# Patient Record
Sex: FEMALE | Race: WHITE | Employment: OTHER | URBAN - METROPOLITAN AREA
[De-identification: names, ages, dates, MRNs, and addresses within clinical notes are randomized per-mention and may not be internally consistent; named-entity substitution may affect disease eponyms.]

---

## 2017-01-17 RX ORDER — LISINOPRIL 40 MG/1
TABLET ORAL
Qty: 30 TABLET | Refills: 3 | Status: SHIPPED | OUTPATIENT
Start: 2017-01-17 | End: 2017-06-19 | Stop reason: SDUPTHER

## 2017-01-26 ENCOUNTER — TELEPHONE (OUTPATIENT)
Dept: FAMILY MEDICINE CLINIC | Age: 62
End: 2017-01-26

## 2017-01-31 ENCOUNTER — OFFICE VISIT (OUTPATIENT)
Dept: FAMILY MEDICINE CLINIC | Age: 62
End: 2017-01-31

## 2017-01-31 VITALS
OXYGEN SATURATION: 99 % | HEART RATE: 63 BPM | TEMPERATURE: 97.6 F | BODY MASS INDEX: 46.82 KG/M2 | SYSTOLIC BLOOD PRESSURE: 160 MMHG | WEIGHT: 248 LBS | DIASTOLIC BLOOD PRESSURE: 84 MMHG | HEIGHT: 61 IN

## 2017-01-31 DIAGNOSIS — R73.9 HYPERGLYCEMIA: ICD-10-CM

## 2017-01-31 DIAGNOSIS — N95.1 POSTMENOPAUSAL DISORDER: ICD-10-CM

## 2017-01-31 DIAGNOSIS — L30.9 DERMATITIS: ICD-10-CM

## 2017-01-31 DIAGNOSIS — Z01.818 PREOP EXAMINATION: Primary | ICD-10-CM

## 2017-01-31 DIAGNOSIS — R31.9 HEMATURIA: ICD-10-CM

## 2017-01-31 DIAGNOSIS — I10 ESSENTIAL HYPERTENSION: ICD-10-CM

## 2017-01-31 DIAGNOSIS — E78.00 HYPERCHOLESTEREMIA: ICD-10-CM

## 2017-01-31 DIAGNOSIS — I47.1 SVT (SUPRAVENTRICULAR TACHYCARDIA) (HCC): ICD-10-CM

## 2017-01-31 LAB
BASOPHILS ABSOLUTE: 0.1 K/UL (ref 0–0.2)
BASOPHILS RELATIVE PERCENT: 0.9 %
EOSINOPHILS ABSOLUTE: 0.2 K/UL (ref 0–0.6)
EOSINOPHILS RELATIVE PERCENT: 2 %
HCT VFR BLD CALC: 44.3 % (ref 36–48)
HEMOGLOBIN: 14.9 G/DL (ref 12–16)
LYMPHOCYTES ABSOLUTE: 3.1 K/UL (ref 1–5.1)
LYMPHOCYTES RELATIVE PERCENT: 30.1 %
MCH RBC QN AUTO: 30 PG (ref 26–34)
MCHC RBC AUTO-ENTMCNC: 33.8 G/DL (ref 31–36)
MCV RBC AUTO: 88.8 FL (ref 80–100)
MONOCYTES ABSOLUTE: 0.6 K/UL (ref 0–1.3)
MONOCYTES RELATIVE PERCENT: 5.3 %
NEUTROPHILS ABSOLUTE: 6.4 K/UL (ref 1.7–7.7)
NEUTROPHILS RELATIVE PERCENT: 61.7 %
PDW BLD-RTO: 12.5 % (ref 12.4–15.4)
PLATELET # BLD: 254 K/UL (ref 135–450)
PMV BLD AUTO: 9.8 FL (ref 5–10.5)
RBC # BLD: 4.99 M/UL (ref 4–5.2)
TSH REFLEX: 2.35 UIU/ML (ref 0.27–4.2)
WBC # BLD: 10.4 K/UL (ref 4–11)

## 2017-01-31 PROCEDURE — 1036F TOBACCO NON-USER: CPT | Performed by: FAMILY MEDICINE

## 2017-01-31 PROCEDURE — G8428 CUR MEDS NOT DOCUMENT: HCPCS | Performed by: FAMILY MEDICINE

## 2017-01-31 PROCEDURE — 93000 ELECTROCARDIOGRAM COMPLETE: CPT | Performed by: FAMILY MEDICINE

## 2017-01-31 PROCEDURE — 36415 COLL VENOUS BLD VENIPUNCTURE: CPT | Performed by: FAMILY MEDICINE

## 2017-01-31 PROCEDURE — 3017F COLORECTAL CA SCREEN DOC REV: CPT | Performed by: FAMILY MEDICINE

## 2017-01-31 PROCEDURE — G8419 CALC BMI OUT NRM PARAM NOF/U: HCPCS | Performed by: FAMILY MEDICINE

## 2017-01-31 PROCEDURE — 99215 OFFICE O/P EST HI 40 MIN: CPT | Performed by: FAMILY MEDICINE

## 2017-01-31 PROCEDURE — 3014F SCREEN MAMMO DOC REV: CPT | Performed by: FAMILY MEDICINE

## 2017-01-31 PROCEDURE — G8484 FLU IMMUNIZE NO ADMIN: HCPCS | Performed by: FAMILY MEDICINE

## 2017-01-31 RX ORDER — BETAMETHASONE DIPROPIONATE 0.5 MG/G
CREAM TOPICAL
Qty: 30 G | Refills: 5 | Status: SHIPPED | OUTPATIENT
Start: 2017-01-31 | End: 2020-12-28 | Stop reason: SDUPTHER

## 2017-01-31 RX ORDER — CIPROFLOXACIN HYDROCHLORIDE 3.5 MG/ML
1 SOLUTION/ DROPS TOPICAL 4 TIMES DAILY
Qty: 1 BOTTLE | Refills: 0 | Status: SHIPPED | OUTPATIENT
Start: 2017-01-31 | End: 2017-02-10

## 2017-01-31 ASSESSMENT — ENCOUNTER SYMPTOMS
ABDOMINAL DISTENTION: 0
SHORTNESS OF BREATH: 0
ABDOMINAL PAIN: 0
COLOR CHANGE: 0
EYE ITCHING: 0
CHEST TIGHTNESS: 0
EYE DISCHARGE: 0

## 2017-02-01 LAB
ALT SERPL-CCNC: 25 U/L (ref 10–40)
ANION GAP SERPL CALCULATED.3IONS-SCNC: 19 MMOL/L (ref 3–16)
BUN BLDV-MCNC: 11 MG/DL (ref 7–20)
CALCIUM SERPL-MCNC: 9.6 MG/DL (ref 8.3–10.6)
CHLORIDE BLD-SCNC: 104 MMOL/L (ref 99–110)
CHOLESTEROL, TOTAL: 206 MG/DL (ref 0–199)
CO2: 21 MMOL/L (ref 21–32)
CREAT SERPL-MCNC: 0.7 MG/DL (ref 0.6–1.1)
ESTIMATED AVERAGE GLUCOSE: 114 MG/DL
GFR AFRICAN AMERICAN: >60
GFR NON-AFRICAN AMERICAN: >60
GLUCOSE BLD-MCNC: 93 MG/DL (ref 70–99)
HBA1C MFR BLD: 5.6 %
HDLC SERPL-MCNC: 62 MG/DL (ref 40–60)
LDL CHOLESTEROL CALCULATED: 115 MG/DL
POTASSIUM SERPL-SCNC: 3.9 MMOL/L (ref 3.5–5.1)
SODIUM BLD-SCNC: 144 MMOL/L (ref 136–145)
TRIGL SERPL-MCNC: 146 MG/DL (ref 0–150)
VLDLC SERPL CALC-MCNC: 29 MG/DL

## 2017-02-24 RX ORDER — PRAVASTATIN SODIUM 40 MG
TABLET ORAL
Qty: 30 TABLET | Refills: 5 | Status: SHIPPED | OUTPATIENT
Start: 2017-02-24 | End: 2017-09-14 | Stop reason: SDUPTHER

## 2017-02-24 RX ORDER — MELOXICAM 15 MG/1
TABLET ORAL
Qty: 30 TABLET | Refills: 5 | Status: SHIPPED | OUTPATIENT
Start: 2017-02-24 | End: 2017-09-14 | Stop reason: SDUPTHER

## 2017-05-24 RX ORDER — AMLODIPINE BESYLATE 2.5 MG/1
TABLET ORAL
Qty: 30 TABLET | Refills: 5 | Status: ON HOLD | OUTPATIENT
Start: 2017-05-24 | End: 2017-10-16 | Stop reason: HOSPADM

## 2017-05-24 RX ORDER — TERAZOSIN 2 MG/1
CAPSULE ORAL
Qty: 30 CAPSULE | Refills: 5 | Status: SHIPPED | OUTPATIENT
Start: 2017-05-24 | End: 2017-12-18 | Stop reason: SDUPTHER

## 2017-05-26 ENCOUNTER — OFFICE VISIT (OUTPATIENT)
Dept: FAMILY MEDICINE CLINIC | Age: 62
End: 2017-05-26

## 2017-05-26 VITALS
BODY MASS INDEX: 47.84 KG/M2 | WEIGHT: 253.2 LBS | DIASTOLIC BLOOD PRESSURE: 98 MMHG | HEART RATE: 62 BPM | OXYGEN SATURATION: 97 % | SYSTOLIC BLOOD PRESSURE: 150 MMHG | TEMPERATURE: 98 F

## 2017-05-26 DIAGNOSIS — R73.9 HYPERGLYCEMIA: ICD-10-CM

## 2017-05-26 DIAGNOSIS — R31.9 HEMATURIA: ICD-10-CM

## 2017-05-26 DIAGNOSIS — E78.00 HYPERCHOLESTEREMIA: ICD-10-CM

## 2017-05-26 DIAGNOSIS — I10 ESSENTIAL HYPERTENSION: Primary | ICD-10-CM

## 2017-05-26 LAB
ALT SERPL-CCNC: 28 U/L (ref 10–40)
ANION GAP SERPL CALCULATED.3IONS-SCNC: 15 MMOL/L (ref 3–16)
BUN BLDV-MCNC: 14 MG/DL (ref 7–20)
CALCIUM SERPL-MCNC: 9.2 MG/DL (ref 8.3–10.6)
CHLORIDE BLD-SCNC: 105 MMOL/L (ref 99–110)
CHOLESTEROL, TOTAL: 199 MG/DL (ref 0–199)
CO2: 23 MMOL/L (ref 21–32)
CREAT SERPL-MCNC: 0.8 MG/DL (ref 0.6–1.1)
GFR AFRICAN AMERICAN: >60
GFR NON-AFRICAN AMERICAN: >60
GLUCOSE BLD-MCNC: 92 MG/DL (ref 70–99)
HDLC SERPL-MCNC: 61 MG/DL (ref 40–60)
LDL CHOLESTEROL CALCULATED: 118 MG/DL
POTASSIUM SERPL-SCNC: 4.3 MMOL/L (ref 3.5–5.1)
SODIUM BLD-SCNC: 143 MMOL/L (ref 136–145)
TRIGL SERPL-MCNC: 101 MG/DL (ref 0–150)
VLDLC SERPL CALC-MCNC: 20 MG/DL

## 2017-05-26 PROCEDURE — 1036F TOBACCO NON-USER: CPT | Performed by: FAMILY MEDICINE

## 2017-05-26 PROCEDURE — 3017F COLORECTAL CA SCREEN DOC REV: CPT | Performed by: FAMILY MEDICINE

## 2017-05-26 PROCEDURE — 36415 COLL VENOUS BLD VENIPUNCTURE: CPT | Performed by: FAMILY MEDICINE

## 2017-05-26 PROCEDURE — 3014F SCREEN MAMMO DOC REV: CPT | Performed by: FAMILY MEDICINE

## 2017-05-26 PROCEDURE — 99214 OFFICE O/P EST MOD 30 MIN: CPT | Performed by: FAMILY MEDICINE

## 2017-05-26 PROCEDURE — G8427 DOCREV CUR MEDS BY ELIG CLIN: HCPCS | Performed by: FAMILY MEDICINE

## 2017-05-26 PROCEDURE — G8417 CALC BMI ABV UP PARAM F/U: HCPCS | Performed by: FAMILY MEDICINE

## 2017-05-27 LAB
ESTIMATED AVERAGE GLUCOSE: 111.2 MG/DL
HBA1C MFR BLD: 5.5 %

## 2017-06-19 RX ORDER — LISINOPRIL 40 MG/1
TABLET ORAL
Qty: 30 TABLET | Refills: 5 | Status: SHIPPED | OUTPATIENT
Start: 2017-06-19 | End: 2018-01-19 | Stop reason: SDUPTHER

## 2017-06-19 RX ORDER — ATENOLOL 50 MG/1
TABLET ORAL
Qty: 30 TABLET | Refills: 5 | Status: ON HOLD | OUTPATIENT
Start: 2017-06-19 | End: 2017-11-11

## 2017-09-14 RX ORDER — PRAVASTATIN SODIUM 40 MG
TABLET ORAL
Qty: 30 TABLET | Refills: 5 | Status: SHIPPED | OUTPATIENT
Start: 2017-09-14 | End: 2018-04-23 | Stop reason: SDUPTHER

## 2017-09-14 RX ORDER — MELOXICAM 15 MG/1
TABLET ORAL
Qty: 30 TABLET | Refills: 5 | Status: SHIPPED | OUTPATIENT
Start: 2017-09-14 | End: 2018-04-23 | Stop reason: SDUPTHER

## 2017-09-26 ENCOUNTER — OFFICE VISIT (OUTPATIENT)
Dept: FAMILY MEDICINE CLINIC | Age: 62
End: 2017-09-26

## 2017-09-26 VITALS
BODY MASS INDEX: 46.41 KG/M2 | OXYGEN SATURATION: 97 % | WEIGHT: 252.2 LBS | HEART RATE: 74 BPM | TEMPERATURE: 97.7 F | DIASTOLIC BLOOD PRESSURE: 84 MMHG | SYSTOLIC BLOOD PRESSURE: 134 MMHG | HEIGHT: 62 IN

## 2017-09-26 DIAGNOSIS — I47.1 SVT (SUPRAVENTRICULAR TACHYCARDIA) (HCC): ICD-10-CM

## 2017-09-26 DIAGNOSIS — Z12.31 SCREENING MAMMOGRAM, ENCOUNTER FOR: ICD-10-CM

## 2017-09-26 DIAGNOSIS — E78.00 HYPERCHOLESTEREMIA: ICD-10-CM

## 2017-09-26 DIAGNOSIS — R73.9 HYPERGLYCEMIA: ICD-10-CM

## 2017-09-26 DIAGNOSIS — I10 ESSENTIAL HYPERTENSION: Primary | ICD-10-CM

## 2017-09-26 LAB
ALT SERPL-CCNC: 21 U/L (ref 10–40)
ANION GAP SERPL CALCULATED.3IONS-SCNC: 16 MMOL/L (ref 3–16)
BUN BLDV-MCNC: 15 MG/DL (ref 7–20)
CALCIUM SERPL-MCNC: 9.4 MG/DL (ref 8.3–10.6)
CHLORIDE BLD-SCNC: 102 MMOL/L (ref 99–110)
CHOLESTEROL, TOTAL: 188 MG/DL (ref 0–199)
CO2: 23 MMOL/L (ref 21–32)
CREAT SERPL-MCNC: 0.9 MG/DL (ref 0.6–1.2)
GFR AFRICAN AMERICAN: >60
GFR NON-AFRICAN AMERICAN: >60
GLUCOSE BLD-MCNC: 86 MG/DL (ref 70–99)
HDLC SERPL-MCNC: 52 MG/DL (ref 40–60)
LDL CHOLESTEROL CALCULATED: 112 MG/DL
POTASSIUM SERPL-SCNC: 4.1 MMOL/L (ref 3.5–5.1)
SODIUM BLD-SCNC: 141 MMOL/L (ref 136–145)
TRIGL SERPL-MCNC: 119 MG/DL (ref 0–150)
TSH REFLEX: 2.68 UIU/ML (ref 0.27–4.2)
VLDLC SERPL CALC-MCNC: 24 MG/DL

## 2017-09-26 PROCEDURE — 99214 OFFICE O/P EST MOD 30 MIN: CPT | Performed by: FAMILY MEDICINE

## 2017-09-26 PROCEDURE — 3014F SCREEN MAMMO DOC REV: CPT | Performed by: FAMILY MEDICINE

## 2017-09-26 PROCEDURE — 1036F TOBACCO NON-USER: CPT | Performed by: FAMILY MEDICINE

## 2017-09-26 PROCEDURE — 3017F COLORECTAL CA SCREEN DOC REV: CPT | Performed by: FAMILY MEDICINE

## 2017-09-26 PROCEDURE — 90471 IMMUNIZATION ADMIN: CPT | Performed by: FAMILY MEDICINE

## 2017-09-26 PROCEDURE — G8417 CALC BMI ABV UP PARAM F/U: HCPCS | Performed by: FAMILY MEDICINE

## 2017-09-26 PROCEDURE — 90688 IIV4 VACCINE SPLT 0.5 ML IM: CPT | Performed by: FAMILY MEDICINE

## 2017-09-26 PROCEDURE — 36415 COLL VENOUS BLD VENIPUNCTURE: CPT | Performed by: FAMILY MEDICINE

## 2017-09-26 PROCEDURE — G8427 DOCREV CUR MEDS BY ELIG CLIN: HCPCS | Performed by: FAMILY MEDICINE

## 2017-09-26 ASSESSMENT — PATIENT HEALTH QUESTIONNAIRE - PHQ9
1. LITTLE INTEREST OR PLEASURE IN DOING THINGS: 0
2. FEELING DOWN, DEPRESSED OR HOPELESS: 0
SUM OF ALL RESPONSES TO PHQ QUESTIONS 1-9: 0
SUM OF ALL RESPONSES TO PHQ9 QUESTIONS 1 & 2: 0

## 2017-09-27 LAB
ESTIMATED AVERAGE GLUCOSE: 108.3 MG/DL
HBA1C MFR BLD: 5.4 %

## 2017-10-16 PROBLEM — R55 SYNCOPE AND COLLAPSE: Status: ACTIVE | Noted: 2017-10-16

## 2017-10-23 ENCOUNTER — TELEPHONE (OUTPATIENT)
Dept: CARDIOLOGY CLINIC | Age: 62
End: 2017-10-23

## 2017-10-27 ENCOUNTER — OFFICE VISIT (OUTPATIENT)
Dept: CARDIOLOGY CLINIC | Age: 62
End: 2017-10-27

## 2017-10-27 VITALS
BODY MASS INDEX: 46.82 KG/M2 | HEIGHT: 61 IN | WEIGHT: 248 LBS | SYSTOLIC BLOOD PRESSURE: 144 MMHG | DIASTOLIC BLOOD PRESSURE: 96 MMHG | HEART RATE: 78 BPM

## 2017-10-27 DIAGNOSIS — I47.1 SVT (SUPRAVENTRICULAR TACHYCARDIA) (HCC): Primary | ICD-10-CM

## 2017-10-27 DIAGNOSIS — R55 SYNCOPE AND COLLAPSE: ICD-10-CM

## 2017-10-27 DIAGNOSIS — G47.33 OSA (OBSTRUCTIVE SLEEP APNEA): ICD-10-CM

## 2017-10-27 DIAGNOSIS — I10 ESSENTIAL HYPERTENSION: ICD-10-CM

## 2017-10-27 PROCEDURE — 3017F COLORECTAL CA SCREEN DOC REV: CPT | Performed by: INTERNAL MEDICINE

## 2017-10-27 PROCEDURE — G8484 FLU IMMUNIZE NO ADMIN: HCPCS | Performed by: INTERNAL MEDICINE

## 2017-10-27 PROCEDURE — 93000 ELECTROCARDIOGRAM COMPLETE: CPT | Performed by: INTERNAL MEDICINE

## 2017-10-27 PROCEDURE — G8417 CALC BMI ABV UP PARAM F/U: HCPCS | Performed by: INTERNAL MEDICINE

## 2017-10-27 PROCEDURE — G8427 DOCREV CUR MEDS BY ELIG CLIN: HCPCS | Performed by: INTERNAL MEDICINE

## 2017-10-27 PROCEDURE — 3014F SCREEN MAMMO DOC REV: CPT | Performed by: INTERNAL MEDICINE

## 2017-10-27 PROCEDURE — 99244 OFF/OP CNSLTJ NEW/EST MOD 40: CPT | Performed by: INTERNAL MEDICINE

## 2017-10-27 NOTE — LETTER
[facility] Cardiology - [address] [facility] [address] [address] [address]  Phone: [phone]  Fax: [phone]    Carolyn Jennings MD        October 29, 2017     Melly Keenan MD  [address] [place] [address]    Patient: Yevgeniy Lerner  MR Number: [id number]  YOB: 1955  Date of Visit: 10/27/2017    Dear Dr. Melly Keenan:    I recently saw our mutual patient, listed above. Below are the relevant portions of my assessment and plan of care. [address]   Electrophysiology Consult Note              Date:  October 27, 2017  Patient name: Yevgeniy Lerner  YOB: 1955    Reason for Consult:   Loss of Consciousness    Requesting Physician: Dr Ankita Elizabeth: Yevgeniy Lerner is a 63 y.o. female who presents for evaluation for syncope and palpitations. She presented to the ER after a syncopal episode. She started wearing a cardiac monitor on discharge. The episode reports showed episodes of 3.1 second pauses between 9:30 PM-07:30 AM. She has episodes of dizziness and near syncope several times a week. She denies CP, SOB, chest pressure, or increased fatigue. Past Medical History:   has a past medical history of HTN and SVT (supraventricular tachycardia) ([address] Past Surgical History:   has a past surgical history that includes Breast biopsy (Right, 2002). Allergies:  Chlorthalidone    Social History:   reports that she quit smoking about 31 years ago. Her smoking use included Cigarettes. She has a 30.00 pack-year smoking history. She has never used smokeless tobacco. She reports that she drinks alcohol. She reports that she does not use drugs. Family History: family history is not on file. Home Medications:    Prior to Admission medications    Medication Sig Start Date End Date Taking?  Authorizing Provider   meloxicam (MOBIC) 15 MG tablet TAKE (1) TABLET DAILY 9/14/17  Yes Melly Keenan MD Physical Examination:    BP (!) 144/96   Pulse 78   Ht 5' 1\" (1.549 m)   Wt 248 lb (112.5 kg)   BMI 46.86 kg/m²       Constitutional and General Appearance:    alert, cooperative, no distress and appears stated age  [de-identified]:    PERRLA, no cervical lymphadenopathy. No masses palpable. Normal oral  mucosa  Respiratory:  · Normal excursion and expansion without use of accessory muscles  · Resp Auscultation: Normal breath sounds without dullness or wheezing  Cardiovascular:  · The apical impulse is not displaced  · Heart tones are crisp and normal. regular S1 and S2.  · Jugular venous pulsation Normal  · The carotid upstroke is normal in amplitude and contour without delay or bruit  · Peripheral pulses are symmetrical and full  Abdomen:  · No masses or tenderness  · Bowel sounds present  Extremities:  ·  No Cyanosis or Clubbing  ·  Lower extremity edema: No  · Skin: Warm and dry  Neurological:  · Alert and oriented. · Moves all extremities well  · No abnormalities of mood, affect, memory, mentation, or behavior are noted      DATA:    ECG:  normal EKG, normal sinus rhythm, unchanged from previous tracings. ECHO: 10/16/17  Summary   Rhythm appears to be atrial fibrillations. Left ventricular systolic function is normal with the ejection fraction   estimated at 55%. No regional wall motion abnormalities. There is mild concentric left ventricular hypertrophy. Left ventricular diastolic filling pressure is indeterminate secondary to   arrhythmia. The right ventricle is mildly enlarged. Right atrial size is mildly dilated . No significant valvular heart disease. IMPRESSION:    1. SVT (supraventricular tachycardia) ([address]( I have not confirmed)   2. Syncope and collapse    3. Essential hypertension    4. Class 3 obesity due to excess calories without serious comorbidity with body mass index (BMI) of 45.0 to 49.9 in adult ([address]    5. ANNA (obstructive sleep apnea) ? ?         RECOMMENDATIONS: 1. Reduce atenolol to 25 mg for two weeks then discontinue. 2. Discussed possible need of pacemaker if episodes of pauses and/or syncope continue after stopping beta blockers  3. Risks and benefits of device placement discussed with patient and family member  3. The patient is asked to make an attempt to improve diet and exercise patterns to aid in medical management of this problem. 5. Pulmonary referral for sleep study  6. Follow up with Evelina Strong NP in 6 weeks or after sleep study      QUALITY MEASURES  1. Tobacco Cessation Counseling: NA  2. Retake of BP if >140/90:   NA  3. Documentation to PCP/referring for new patient:  Sent to PCP at close of office visit  4. CAD patient on anti-platelet: NA  5. CAD patient on STATIN therapy:  NA  6. Patient with CHF and aFib on anticoagulation:  NA       All questions and concerns were addressed to the patient/family. Alternatives to my treatment were discussed. BRIDGETT Reardon F.A.C.C. Electrophysiology  [facility] [place], [facility]. [address] [facility]. [address]. [place], [address]  Phone: [phone]  Fax: [phone]            If you have questions, please do not hesitate to call me. I look forward to following Beth Parra along with you.     Sincerely,        Navneet Holley MD

## 2017-10-27 NOTE — PROGRESS NOTES
[address]   Electrophysiology Consult Note              Date:  October 27, 2017  Patient name: Irina Wesley  YOB: 1955    Reason for Consult:   Loss of Consciousness    Requesting Physician:  [address]: Irina Wesley is a 63 y.o. female who presents for evaluation for syncope and palpitations. She presented to the ER after a syncopal episode. She started wearing a cardiac monitor on discharge. The episode reports showed episodes of 3.1 second pauses between 9:30 PM-07:30 AM. She has episodes of dizziness and near syncope several times a week. She denies CP, SOB, chest pressure, or increased fatigue. Past Medical History:   has a past medical history of HTN and SVT (supraventricular tachycardia) ([facility] [address] Past Surgical History:   has a past surgical history that includes Breast biopsy (Right, 2002). Allergies:  Chlorthalidone    Social History:   reports that she quit smoking about 31 years ago. Her smoking use included Cigarettes. She has a 30.00 pack-year smoking history. She has never used smokeless tobacco. She reports that she drinks alcohol. She reports that she does not use drugs. Family History: family history is not on file. Home Medications:    Prior to Admission medications    Medication Sig Start Date End Date Taking?  Authorizing Provider   meloxicam (MOBIC) 15 MG tablet TAKE (1) TABLET DAILY 9/14/17  Yes Makenzie Martinez MD   pravastatin (PRAVACHOL) 40 MG tablet TAKE (1) TABLET DAILY 9/14/17  Yes Mkaenzie Martinez MD   atenolol (TENORMIN) 50 MG tablet TAKE (1) TABLET DAILY 6/19/17  Yes Makenzie Martinez MD   lisinopril (PRINIVIL;ZESTRIL) 40 MG tablet TAKE (1) TABLET DAILY 6/19/17  Yes Makenzie Martinez MD   terazosin (HYTRIN) 2 MG capsule TAKE 1 CAPSULE NIGHTLY 5/24/17  Yes Makenzie Martinez MD   augmented betamethasone dipropionate (DIPROLENE AF) 0.05 % cream APPLY TWICE A DAY TO THE AFFECTED AREA(S) 1/31/17  Yes Tu Hernandes displaced  · Heart tones are crisp and normal. regular S1 and S2.  · Jugular venous pulsation Normal  · The carotid upstroke is normal in amplitude and contour without delay or bruit  · Peripheral pulses are symmetrical and full  Abdomen:  · No masses or tenderness  · Bowel sounds present  Extremities:  ·  No Cyanosis or Clubbing  ·  Lower extremity edema: No  · Skin: Warm and dry  Neurological:  · Alert and oriented. · Moves all extremities well  · No abnormalities of mood, affect, memory, mentation, or behavior are noted      DATA:    ECG:  normal EKG, normal sinus rhythm, unchanged from previous tracings. ECHO: 10/16/17  Summary   Rhythm appears to be atrial fibrillations. Left ventricular systolic function is normal with the ejection fraction   estimated at 55%. No regional wall motion abnormalities. There is mild concentric left ventricular hypertrophy. Left ventricular diastolic filling pressure is indeterminate secondary to   arrhythmia. The right ventricle is mildly enlarged. Right atrial size is mildly dilated . No significant valvular heart disease. IMPRESSION:    1. SVT (supraventricular tachycardia) ([address]( I have not confirmed)   2. Syncope and collapse    3. Essential hypertension    4. Class 3 obesity due to excess calories without serious comorbidity with body mass index (BMI) of 45.0 to 49.9 in adult ([address]    5. ANNA (obstructive sleep apnea) ? ?         RECOMMENDATIONS:    1. Reduce atenolol to 25 mg for two weeks then discontinue. 2. Discussed possible need of pacemaker if episodes of pauses and/or syncope continue after stopping beta blockers  3. Risks and benefits of device placement discussed with patient and family member  3. The patient is asked to make an attempt to improve diet and exercise patterns to aid in medical management of this problem. 5. Pulmonary referral for sleep study  6.  Follow up with Kosta Glover NP in 6 weeks or after sleep study      QUALITY

## 2017-10-27 NOTE — PATIENT INSTRUCTIONS
RECOMMENDATIONS:    1. Reduce atenolol to 25 mg for two weeks then discontinue. 2. Discussed possible need of pacemaker if episodes of pauses and/or syncope  3. Risks and benefits of device placement discussed with patient and family member  3. The patient is asked to make an attempt to improve diet and exercise patterns to aid in medical management of this problem. 5. Pulmonary referral for sleep study  6.  Follow up with [facility] NP in 6 weeks or after sleep study

## 2017-10-30 ENCOUNTER — TELEPHONE (OUTPATIENT)
Dept: CARDIOLOGY CLINIC | Age: 62
End: 2017-10-30

## 2017-10-30 DIAGNOSIS — R55 SYNCOPE AND COLLAPSE: ICD-10-CM

## 2017-10-30 NOTE — COMMUNICATION BODY
Ninfa Preciado MD   aspirin 81 MG EC tablet Take 81 mg by mouth daily. Yes Historical Provider, MD          REVIEW OF SYSTEMS:    · Constitutional: there has been no unanticipated weight loss. There's been no  change in energy level, sleep pattern, or activity level. · Eyes: No visual changes or diplopia. No scleral icterus. · ENT: No Headaches, hearing loss or vertigo. No mouth sores or sore throat. · Cardiovascular: No for chest pain, No for dyspnea  on exertion, No for palpitations or No for loss of  consciousness. No cough, hemoptysis, No for pleuritic pain, or  phlebitis. · Respiratory: No for cough or wheezing, no sputum production. No hematemesis. · Gastrointestinal: No abdominal pain, appetite loss, blood in stools. No change in  bowel or bladder habits. · Genitourinary: No dysuria, trouble voiding, or hematuria. · Musculoskeletal:  No gait disturbance, No for weakness or joint  complaints. · Integumentary: No rash or pruritis. · Neurological: No headache, diplopia, change in muscle strength, numbness or  tingling. No change in gait, balance, coordination, mood, affect, memory,  mentation, behavior. · Psychiatric: No anxiety, or depression. · Endocrine: No temperature intolerance. No excessive thirst, fluid intake, or  urination. No tremor. · Hematologic/Lymphatic: No abnormal bruising or bleeding, blood clots or  swollen lymph nodes. · Allergic/Immunologic: No nasal congestion or hives. Physical Examination:    BP (!) 144/96   Pulse 78   Ht 5' 1\" (1.549 m)   Wt 248 lb (112.5 kg)   BMI 46.86 kg/m²       Constitutional and General Appearance:    alert, cooperative, no distress and appears stated age  [de-identified]:    PERRLA, no cervical lymphadenopathy. No masses palpable.  Normal oral  mucosa  Respiratory:  · Normal excursion and expansion without use of accessory muscles  · Resp Auscultation: Normal breath sounds without dullness or wheezing  Cardiovascular:  · The apical impulse is not displaced  · Heart tones are crisp and normal. regular S1 and S2.  · Jugular venous pulsation Normal  · The carotid upstroke is normal in amplitude and contour without delay or bruit  · Peripheral pulses are symmetrical and full  Abdomen:  · No masses or tenderness  · Bowel sounds present  Extremities:  ·  No Cyanosis or Clubbing  ·  Lower extremity edema: No  · Skin: Warm and dry  Neurological:  · Alert and oriented. · Moves all extremities well  · No abnormalities of mood, affect, memory, mentation, or behavior are noted      DATA:    ECG:  normal EKG, normal sinus rhythm, unchanged from previous tracings. ECHO: 10/16/17  Summary   Rhythm appears to be atrial fibrillations. Left ventricular systolic function is normal with the ejection fraction   estimated at 55%. No regional wall motion abnormalities. There is mild concentric left ventricular hypertrophy. Left ventricular diastolic filling pressure is indeterminate secondary to   arrhythmia. The right ventricle is mildly enlarged. Right atrial size is mildly dilated . No significant valvular heart disease. IMPRESSION:    1. SVT (supraventricular tachycardia) ([address]( I have not confirmed)   2. Syncope and collapse    3. Essential hypertension    4. Class 3 obesity due to excess calories without serious comorbidity with body mass index (BMI) of 45.0 to 49.9 in adult ([address]    5. ANNA (obstructive sleep apnea) ? ?         RECOMMENDATIONS:    1. Reduce atenolol to 25 mg for two weeks then discontinue. 2. Discussed possible need of pacemaker if episodes of pauses and/or syncope continue after stopping beta blockers  3. Risks and benefits of device placement discussed with patient and family member  3. The patient is asked to make an attempt to improve diet and exercise patterns to aid in medical management of this problem. 5. Pulmonary referral for sleep study  6.  Follow up with Rex Tan NP in 6 weeks or after sleep study      QUALITY MEASURES  1. Tobacco Cessation Counseling: NA  2. Retake of BP if >140/90:   NA  3. Documentation to PCP/referring for new patient:  Sent to PCP at close of office visit  4. CAD patient on anti-platelet: NA  5. CAD patient on STATIN therapy:  NA  6. Patient with CHF and aFib on anticoagulation:  NA       All questions and concerns were addressed to the patient/family. Alternatives to my treatment were discussed. BRIDGETT Reardon F.A.C.C. [place] [address]. [address] [facility]. [address].   [place], [address]  Phone: [phone]  Fax: [phone]

## 2017-11-06 ENCOUNTER — TELEPHONE (OUTPATIENT)
Dept: CARDIOLOGY CLINIC | Age: 62
End: 2017-11-06

## 2017-11-06 NOTE — TELEPHONE ENCOUNTER
I spoke with patient regarding results from patients monitor. She had pauses. The patient states [facility] called her and stated they spoke with our \"on call\" doctor, and was recommended she be seen soon. The patient told me she has a $27,000. ER bill and that she was told nothing as to what was wrong with her, and that she was NOT going back to the ED. Dr. Tl De Los Santos spoke with the patient after me. He explained she needs a pacemaker. She and Dr. Tl De Los Santos decided on 11/8/17, 11/10/17, or 11/13/17. I will forward this to Maribel and we will discuss when is the best time for Dr. Tl De Los Santos to do the procedure.

## 2017-11-08 NOTE — TELEPHONE ENCOUNTER
Dr. Diane Curry, I spoke with patient. I told her I would call her back after confirming date with you.

## 2017-11-09 ENCOUNTER — OFFICE VISIT (OUTPATIENT)
Dept: PULMONOLOGY | Age: 62
End: 2017-11-09

## 2017-11-09 VITALS
BODY MASS INDEX: 46.75 KG/M2 | HEART RATE: 85 BPM | OXYGEN SATURATION: 98 % | TEMPERATURE: 98.4 F | WEIGHT: 247.6 LBS | RESPIRATION RATE: 16 BRPM | DIASTOLIC BLOOD PRESSURE: 82 MMHG | HEIGHT: 61 IN | SYSTOLIC BLOOD PRESSURE: 140 MMHG

## 2017-11-09 DIAGNOSIS — Z72.821 POOR SLEEP HYGIENE: ICD-10-CM

## 2017-11-09 DIAGNOSIS — G47.33 OBSTRUCTIVE SLEEP APNEA: Primary | ICD-10-CM

## 2017-11-09 DIAGNOSIS — R55 SYNCOPE, UNSPECIFIED SYNCOPE TYPE: ICD-10-CM

## 2017-11-09 DIAGNOSIS — G47.19 EXCESSIVE DAYTIME SLEEPINESS: ICD-10-CM

## 2017-11-09 PROCEDURE — 3014F SCREEN MAMMO DOC REV: CPT | Performed by: INTERNAL MEDICINE

## 2017-11-09 PROCEDURE — G8427 DOCREV CUR MEDS BY ELIG CLIN: HCPCS | Performed by: INTERNAL MEDICINE

## 2017-11-09 PROCEDURE — G8484 FLU IMMUNIZE NO ADMIN: HCPCS | Performed by: INTERNAL MEDICINE

## 2017-11-09 PROCEDURE — G8417 CALC BMI ABV UP PARAM F/U: HCPCS | Performed by: INTERNAL MEDICINE

## 2017-11-09 PROCEDURE — 3017F COLORECTAL CA SCREEN DOC REV: CPT | Performed by: INTERNAL MEDICINE

## 2017-11-09 PROCEDURE — 99244 OFF/OP CNSLTJ NEW/EST MOD 40: CPT | Performed by: INTERNAL MEDICINE

## 2017-11-09 ASSESSMENT — SLEEP AND FATIGUE QUESTIONNAIRES
HOW LIKELY ARE YOU TO NOD OFF OR FALL ASLEEP WHEN YOU ARE A PASSENGER IN A CAR FOR AN HOUR WITHOUT A BREAK: 1
HOW LIKELY ARE YOU TO NOD OFF OR FALL ASLEEP WHILE SITTING AND TALKING TO SOMEONE: 1
HOW LIKELY ARE YOU TO NOD OFF OR FALL ASLEEP WHILE SITTING AND READING: 2
HOW LIKELY ARE YOU TO NOD OFF OR FALL ASLEEP WHILE LYING DOWN TO REST IN THE AFTERNOON WHEN CIRCUMSTANCES PERMIT: 3
ESS TOTAL SCORE: 13
HOW LIKELY ARE YOU TO NOD OFF OR FALL ASLEEP IN A CAR, WHILE STOPPED FOR A FEW MINUTES IN TRAFFIC: 1
HOW LIKELY ARE YOU TO NOD OFF OR FALL ASLEEP WHILE SITTING QUIETLY AFTER LUNCH WITHOUT ALCOHOL: 2
NECK CIRCUMFERENCE (INCHES): 14
HOW LIKELY ARE YOU TO NOD OFF OR FALL ASLEEP WHILE SITTING INACTIVE IN A PUBLIC PLACE: 1
HOW LIKELY ARE YOU TO NOD OFF OR FALL ASLEEP WHILE WATCHING TV: 2

## 2017-11-09 NOTE — LETTER
[address] [facility] Pulmonary Critical Care and Sleep  [address] [place] [address]  Phone: [phone]  Fax: [phone]    Miguelito De Dios MD        November 9, 2017       Patient: Mery Vasquez   MR Number: [id number]   YOB: 1955   Date of Visit: 11/9/2017       Dear Dr. Pappas Lines: Thank you for the request for consultation for Ute Harris to me for the evaluation of ANNA. Below are the relevant portions of my assessment and plan of care. If you have questions, please do not hesitate to call me. I look forward to following Severo Payton along with you.     Sincerely,        Charo Starks MD    CC providers:  Lashanda Saleh MD  [address] [place] [address], [address]

## 2017-11-09 NOTE — PROGRESS NOTES
scheduled for Monday. She denies any chest pain or dyspnea. She has no cough. She does have dizziness and lightheadedness, blurred vision and vague nausea and abdominal pain around the episodes of syncope. The patient is a , her   from bladder cancer. She had been told by him that she had loud snoring, she has woken herself up snoring. She has no snorting or choking, no PND or orthopnea. Occasionally she has sweating which she thinks are due to hot flashes, she has occasional nocturnal reflux. She goes to bed around 11 PM, is playing games on her [place], occasionally watching television when she sleeps in a recliner as she has a water bed and has been told not to use it. When her  was alive, she slept in a recliner. When she worked, she was having shifted to waking up at 4:30 AM.  Now she wakes up between 4:30 and 5:30 AM, gets out of bed because she is looking forward to babysitting her grandchildren. Over the weekends, she wakes up, but then goes back to sleep and gets out of bed later around 7 or 7:30 AM.  She thinks she is not refreshed, but she is happy to look after her grandchildren, looks forward to the day. Once or twice a week, she falls asleep between 8:30 and 9 PM, then will wake up around 1:30 AM.  She tends to watch television between 12:30 and 2 in the afternoon, falls asleep watching TV once or twice a week. She is not sleepy during driving, but does feel tired in the daytime. She occasionally bites her tongue just at the time and she is about to fall asleep, then sleeps with her mouth guard. 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 Jennifer Medina, rheumatologist). She has a brother and 3 sisters, her sister may be developing Parkinson's disease. Her father  of Parkinson's disease, she is not sure what her mother  of, possibly MI. Her mother had non-Hodgkin's lymphoma.   She has 2 children, healthy. The patient was a heavy smoker, from age [age], smoked 2-3 packs per day. She does not drink alcohol. She was  in December 2013. 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 The patient's past medical, surgical, family and personal history were reviewed by me personally, changes made in EMR as needed. Echocardiogram 10/16/17  Rhythm appears to be atrial fibrillation. Left ventricular systolic function is normal with the ejection fraction estimated at 55%. No regional wall motion abnormalities. There is mild concentric left ventricular hypertrophy. Left ventricular diastolic filling pressure is indeterminate secondary to arrhythmia. The right ventricle is mildly enlarged. Right atrial size is mildly dilated . No significant valvular heart disease. CTPA 10/17/17  1. No acute findings in the chest.  Specifically, no findings of pulmonary   embolism. 2. Mild cardiomegaly with mild right ventricular hypertrophy and mild   concentric left ventricular hypertrophy. 3. A few incidental findings above for which no follow-up is recommended. Labs 10/16/17  CBC and renal profile normal. In 2013 she has had a positive SOPHY with titers 1:160, RF was negative. \"x\" indicates this is positive or the patient agrees.    [x] Loud Snoring [] Nighmares   [] Frequent awakenings at night [] Teeth grinding during sleep   [] Choking for breath at night [] Morning headaches   [] Gasping during sleep [] Morning dry mouth   [] I've been told that I stop breathing when asleep [] Sleep walking   [] Restless sleep [] Sleep terrors   [x] Awaken un-refreshed [] Tongue biting during sleep   [x] Waking up to urinate [] Bed wetting   [] Crawling feelings in legs when trying to sleep [] Acting out dreams   [] Leg kicking during sleep [] Feeling paralyzed when falling asleep or waking up    [] Leg cramps during sleep [] Dream-like images when falling asleep   [] Trouble tobacco.      ALLERGIES:  Patient is allergic to chlorthalidone. REVIEW OF SYSTEMS:  Constitutional: Negative for fever   HENT: Negative for sore throat  Eyes: Negative for redness   Respiratory: Negative for dyspnea, cough  Cardiovascular: Negative for chest pain  Gastrointestinal: Negative for vomiting, diarrhea   Genitourinary: Negative for hematuria   Musculoskeletal: Positive for arthralgias   Skin: Negative for rash  Neurological: Positive for syncope  Hematological: Negative for adenopathy  Psychiatric/Behavorial: Negative for anxiety    Objective:   PHYSICAL EXAM:  Blood pressure (!) 140/82, pulse 85, temperature 98.4 °F (36.9 °C), temperature source Oral, resp. rate 16, height 5' 1\" (1.549 m), weight 247 lb 9.6 oz (112.3 kg), SpO2 98 %.'  Gen: Very pleasant, short, obese. No distress. Eyes: PERRL. No sclera icterus. No conjunctival injection. ENT: No discharge. Pharynx clear. Class III airway, enlarged tonsils. External appearance of ears and nose normal. Mallampati  III  Neck: Trachea midline. No obvious mass. Resp: No accessory muscle use. No crackles. No wheezes. No rhonchi. CV: Regular rate. Regular rhythm. No murmur or rub. No edema. GI: Fatty abdominal wall. Non-tender. Non-distended. Skin: Warm, dry, normal texture and turgor. No nodule on exposed extremities. Lymph: No cervical LAD. No supraclavicular LAD. M/S: No cyanosis. No clubbing. No joint deformity. Neuro: Moves all four extremities. Psych: Oriented x 3. No anxiety. Awake. Alert. Intact judgement and insight.     Current Outpatient Prescriptions   Medication Sig Dispense Refill    meloxicam (MOBIC) 15 MG tablet TAKE (1) TABLET DAILY 30 tablet 5    pravastatin (PRAVACHOL) 40 MG tablet TAKE (1) TABLET DAILY 30 tablet 5    atenolol (TENORMIN) 50 MG tablet TAKE (1) TABLET DAILY 30 tablet 5    lisinopril (PRINIVIL;ZESTRIL) 40 MG tablet TAKE (1) TABLET DAILY 30 tablet 5    terazosin (HYTRIN) 2 MG capsule TAKE 1

## 2017-11-10 ENCOUNTER — TELEPHONE (OUTPATIENT)
Dept: CARDIOLOGY CLINIC | Age: 62
End: 2017-11-10

## 2017-11-10 PROBLEM — I49.5 SINUS NODE DYSFUNCTION (HCC): Status: ACTIVE | Noted: 2017-11-10

## 2017-11-10 PROBLEM — Z95.0 PACEMAKER: Status: ACTIVE | Noted: 2017-11-10

## 2017-11-10 PROBLEM — L02.91 ABSCESS: Status: ACTIVE | Noted: 2017-11-10

## 2017-11-10 NOTE — TELEPHONE ENCOUNTER
I tried to reach patient on her only available number. The number rings and no VM available. I need to speak with her regarding monitor results. I was able to speak with patient at a cell number that I got from her daughter. I told the patient to go to the ed.

## 2017-11-11 PROBLEM — I45.5 SINUS PAUSE: Status: ACTIVE | Noted: 2017-11-10

## 2017-11-13 ENCOUNTER — HOSPITAL ENCOUNTER (OUTPATIENT)
Dept: OTHER | Age: 62
Discharge: HOME | End: 2017-11-13
Attending: INTERNAL MEDICINE | Admitting: INTERNAL MEDICINE

## 2017-11-14 PROCEDURE — 93272 ECG/REVIEW INTERPRET ONLY: CPT | Performed by: INTERNAL MEDICINE

## 2017-11-17 ENCOUNTER — PROCEDURE VISIT (OUTPATIENT)
Dept: CARDIOLOGY CLINIC | Age: 62
End: 2017-11-17

## 2017-11-17 DIAGNOSIS — I45.5 SINUS PAUSE: ICD-10-CM

## 2017-11-17 DIAGNOSIS — Z95.0 PACEMAKER: Primary | ICD-10-CM

## 2017-11-17 PROCEDURE — 93280 PM DEVICE PROGR EVAL DUAL: CPT | Performed by: INTERNAL MEDICINE

## 2017-11-17 NOTE — PROGRESS NOTES
Postop check, steri strips removed. Incision clean, dry and intact. No redness, swelling or drainage noted. Postop care/restrictions and carelink reviewed. Patient comes in for programming evaluation for her Medtronic Dual Chamber Pacemaker. All sensing and pacing parameters are within normal range. No changes need to be made at this time. Please see interrogation for more detail. She has new AT recorded-see arrhythmia list. Dr Ruby Horton reviewed the list and EGM's. He said the AT is not sustaining and to watch for increased episodes. No new orders. Her [id number] for HTN and Female. She has a history of SVT and was placed on atenolol. She states she does feel fluttering palpitations. Patient will follow up in 12/21/17 with Rohith and medtronic will check the device.

## 2017-12-04 ENCOUNTER — TELEPHONE (OUTPATIENT)
Dept: FAMILY MEDICINE CLINIC | Age: 62
End: 2017-12-04

## 2017-12-11 ENCOUNTER — TELEPHONE (OUTPATIENT)
Dept: CARDIOLOGY CLINIC | Age: 62
End: 2017-12-11

## 2017-12-11 NOTE — TELEPHONE ENCOUNTER
Refill on atenolol (TENORMIN) 50 MG tablet    Send to :  [facility], [place] - [address] - P [phone] - F [phone]

## 2017-12-12 RX ORDER — ATENOLOL 50 MG/1
100 TABLET ORAL DAILY
Qty: 60 TABLET | Refills: 11 | Status: SHIPPED | OUTPATIENT
Start: 2017-12-12 | End: 2018-12-04 | Stop reason: SDUPTHER

## 2017-12-18 ENCOUNTER — HOSPITAL ENCOUNTER (OUTPATIENT)
Dept: SLEEP MEDICINE | Age: 62
Discharge: HOME | End: 2017-12-18
Attending: INTERNAL MEDICINE | Admitting: INTERNAL MEDICINE

## 2017-12-18 DIAGNOSIS — G47.33 OBSTRUCTIVE SLEEP APNEA: ICD-10-CM

## 2017-12-18 RX ORDER — TERAZOSIN 2 MG/1
CAPSULE ORAL
Qty: 30 CAPSULE | Refills: 5 | Status: SHIPPED | OUTPATIENT
Start: 2017-12-18 | End: 2018-07-21 | Stop reason: SDUPTHER

## 2017-12-21 ENCOUNTER — OFFICE VISIT (OUTPATIENT)
Dept: PULMONOLOGY | Age: 62
End: 2017-12-21

## 2017-12-21 ENCOUNTER — PROCEDURE VISIT (OUTPATIENT)
Dept: CARDIOLOGY CLINIC | Age: 62
End: 2017-12-21

## 2017-12-21 ENCOUNTER — OFFICE VISIT (OUTPATIENT)
Dept: CARDIOLOGY CLINIC | Age: 62
End: 2017-12-21

## 2017-12-21 VITALS
OXYGEN SATURATION: 98 % | RESPIRATION RATE: 16 BRPM | DIASTOLIC BLOOD PRESSURE: 92 MMHG | TEMPERATURE: 97.5 F | HEART RATE: 66 BPM | SYSTOLIC BLOOD PRESSURE: 166 MMHG | HEIGHT: 61 IN | WEIGHT: 247 LBS | BODY MASS INDEX: 46.63 KG/M2

## 2017-12-21 VITALS
WEIGHT: 247 LBS | DIASTOLIC BLOOD PRESSURE: 84 MMHG | HEIGHT: 61 IN | BODY MASS INDEX: 46.63 KG/M2 | SYSTOLIC BLOOD PRESSURE: 140 MMHG

## 2017-12-21 DIAGNOSIS — I10 ESSENTIAL HYPERTENSION: ICD-10-CM

## 2017-12-21 DIAGNOSIS — G47.33 OSA (OBSTRUCTIVE SLEEP APNEA): ICD-10-CM

## 2017-12-21 DIAGNOSIS — I47.1 PAROXYSMAL ATRIAL TACHYCARDIA (HCC): ICD-10-CM

## 2017-12-21 DIAGNOSIS — I49.5 SINUS NODE DYSFUNCTION (HCC): Primary | ICD-10-CM

## 2017-12-21 DIAGNOSIS — Z95.0 PACEMAKER: Primary | ICD-10-CM

## 2017-12-21 DIAGNOSIS — I45.5 SINUS PAUSE: ICD-10-CM

## 2017-12-21 PROCEDURE — 93280 PM DEVICE PROGR EVAL DUAL: CPT | Performed by: INTERNAL MEDICINE

## 2017-12-21 PROCEDURE — 3017F COLORECTAL CA SCREEN DOC REV: CPT | Performed by: INTERNAL MEDICINE

## 2017-12-21 PROCEDURE — G8427 DOCREV CUR MEDS BY ELIG CLIN: HCPCS | Performed by: INTERNAL MEDICINE

## 2017-12-21 PROCEDURE — 1036F TOBACCO NON-USER: CPT | Performed by: NURSE PRACTITIONER

## 2017-12-21 PROCEDURE — G8417 CALC BMI ABV UP PARAM F/U: HCPCS | Performed by: NURSE PRACTITIONER

## 2017-12-21 PROCEDURE — G8484 FLU IMMUNIZE NO ADMIN: HCPCS | Performed by: NURSE PRACTITIONER

## 2017-12-21 PROCEDURE — G8417 CALC BMI ABV UP PARAM F/U: HCPCS | Performed by: INTERNAL MEDICINE

## 2017-12-21 PROCEDURE — 99214 OFFICE O/P EST MOD 30 MIN: CPT | Performed by: INTERNAL MEDICINE

## 2017-12-21 PROCEDURE — 3014F SCREEN MAMMO DOC REV: CPT | Performed by: NURSE PRACTITIONER

## 2017-12-21 PROCEDURE — G8427 DOCREV CUR MEDS BY ELIG CLIN: HCPCS | Performed by: NURSE PRACTITIONER

## 2017-12-21 PROCEDURE — 99213 OFFICE O/P EST LOW 20 MIN: CPT | Performed by: NURSE PRACTITIONER

## 2017-12-21 PROCEDURE — 3014F SCREEN MAMMO DOC REV: CPT | Performed by: INTERNAL MEDICINE

## 2017-12-21 PROCEDURE — 1036F TOBACCO NON-USER: CPT | Performed by: INTERNAL MEDICINE

## 2017-12-21 PROCEDURE — 3017F COLORECTAL CA SCREEN DOC REV: CPT | Performed by: NURSE PRACTITIONER

## 2017-12-21 PROCEDURE — G8484 FLU IMMUNIZE NO ADMIN: HCPCS | Performed by: INTERNAL MEDICINE

## 2017-12-21 RX ORDER — BIOTIN 10 MG
10 TABLET ORAL 2 TIMES DAILY
COMMUNITY
End: 2018-05-25

## 2017-12-21 ASSESSMENT — SLEEP AND FATIGUE QUESTIONNAIRES
NECK CIRCUMFERENCE (INCHES): 14.25
HOW LIKELY ARE YOU TO NOD OFF OR FALL ASLEEP WHILE LYING DOWN TO REST IN THE AFTERNOON WHEN CIRCUMSTANCES PERMIT: 3
HOW LIKELY ARE YOU TO NOD OFF OR FALL ASLEEP WHILE SITTING INACTIVE IN A PUBLIC PLACE: 0
HOW LIKELY ARE YOU TO NOD OFF OR FALL ASLEEP IN A CAR, WHILE STOPPED FOR A FEW MINUTES IN TRAFFIC: 0
HOW LIKELY ARE YOU TO NOD OFF OR FALL ASLEEP WHILE SITTING AND TALKING TO SOMEONE: 0
ESS TOTAL SCORE: 8
HOW LIKELY ARE YOU TO NOD OFF OR FALL ASLEEP WHEN YOU ARE A PASSENGER IN A CAR FOR AN HOUR WITHOUT A BREAK: 1
HOW LIKELY ARE YOU TO NOD OFF OR FALL ASLEEP WHILE WATCHING TV: 1
HOW LIKELY ARE YOU TO NOD OFF OR FALL ASLEEP WHILE SITTING QUIETLY AFTER LUNCH WITHOUT ALCOHOL: 2
HOW LIKELY ARE YOU TO NOD OFF OR FALL ASLEEP WHILE SITTING AND READING: 1

## 2017-12-21 NOTE — LETTER
[address] [facility] Pulmonary Critical Care and Sleep  [address] [place] [address]  Phone: [phone]  Fax: [phone]    Chapo Meneses MD        December 21, 2017       Patient: Georgie Crenshaw   MR Number: [id number]   YOB: 1955   Date of Visit: 12/21/2017       Dear Dr. Jade Moreno:    . Below are the relevant portions of my assessment and plan of care. If you have questions, please do not hesitate to call me. I look forward to following Mitcheal Spurling along with you. Sincerely,        Panchito Chino MD    CC providers:   Jose Gardner MD  [address] [facility] [address]  VIA In [facility] MD Stew  [facility] [address] [place] [address]

## 2017-12-21 NOTE — PROGRESS NOTES
[address]   Electrophysiology  Note              Date:  December 21, 2017  Patient name: Philly Lauren  YOB: 1955    Primary Care physician: Caryle Haymaker, MD    HISTORY OF PRESENT ILLNESS: The patient is a 63 y.o.  female with a past medical history of sinus node dysfunction, syncope, PAT, HTN, and ANNA. She was admitted in 10/2017 with syncope. Echo showed an EF of 55%. She was discharged with a 30 day event monitor and multiple pauses were detected. She had a 6.4 second pause with syncope and had a dual chamber pacemaker implanted on 11/10/2017. PAT was detected during her hospital stay and she was restarted on atenolol. Today she is being seen for sinus node dysfunction. Her device check shows normal function and PAT. She complains of fatigue but denies chest pain, palpitations, shortness of breath, and dizziness. Past Medical History:   has a past medical history of HTN; SVT (supraventricular tachycardia) ([address] and Wrist fracture, closed, left, with routine healing, subsequent encounter. Past Surgical History:   has a past surgical history that includes Breast biopsy (Right, 2002) and cyst removal (Right). Home Medications:    Prior to Admission medications    Medication Sig Start Date End Date Taking?  Authorizing Provider   Biotin 10 MG tablet Take 10 mg by mouth 2 times daily    Historical Provider, MD   terazosin (HYTRIN) 2 MG capsule TAKE 1 CAPSULE NIGHTLY 12/18/17   Caryle Haymaker, MD   atenolol (TENORMIN) 50 MG tablet Take 2 tablets by mouth daily 12/12/17   Soto Olguin CNP   meloxicam (MOBIC) 15 MG tablet TAKE (1) TABLET DAILY 9/14/17   Caryle Haymaker, MD   pravastatin (PRAVACHOL) 40 MG tablet TAKE (1) TABLET DAILY 9/14/17   Caryle Haymaker, MD   lisinopril (PRINIVIL;ZESTRIL) 40 MG tablet TAKE (1) TABLET DAILY 6/19/17   Caryle Haymaker, MD   augmented betamethasone dipropionate (DIPROLENE AF) 0.05 % cream APPLY TWICE A DAY TO THE AFFECTED AREA(S) 1/31/17   Shona Valentine MD   aspirin 81 MG EC tablet Take 81 mg by mouth daily. Historical Provider, MD       Allergies:  Chlorthalidone    Social History:   reports that she quit smoking about 31 years ago. Her smoking use included Cigarettes. She has a 30.00 pack-year smoking history. She has never used smokeless tobacco. She reports that she drinks alcohol. She reports that she does not use drugs. Family History: family history includes Hypertension in her father; Parkinsonism in her father. Review of Systems   Constitutional: + fatigue  HENT: Negative. Eyes: Negative. Respiratory: Negative. Cardiovascular: see HPI  Gastrointestinal: Negative. Genitourinary: Negative. Musculoskeletal: Negative. Skin: Negative. Neurological: Negative. Hematological: Negative. Psychiatric/Behavioral: Negative. PHYSICAL EXAM:    Physical Examination:    BP (!) 140/84   Ht 5' 1\" (1.549 m)   Wt 247 lb (112 kg)   BMI 46.67 kg/m²      Constitutional and general appearance: alert, cooperative, no distress and appears stated age  [de-identified]: PERRL, no cervical lymphadenopathy. No masses palpable.  Normal oral mucosa  Respiratory:  · Normal excursion and expansion without use of accessory muscles  · Resp auscultation: Normal breath sounds without dullness or wheezing  Cardiovascular:  · The apical impulse is not displaced  · Heart tones are crisp and normal. Regular S1 and S2.  · Jugular venous pulsation Normal  · The carotid upstroke is normal in amplitude and contour without delay or bruit  · Peripheral pulses are symmetrical and full   Abdomen:  · No masses or tenderness  · Bowel sounds present  Extremities:  ·  No cyanosis or clubbing  ·  No lower extremity edema  ·  Skin: warm and dry; left upper chest incision is closed and healed  Neurological:  · Alert and oriented  · Moves all extremities well  · No abnormalities of mood, affect, memory, mentation, or behavior are noted    DATA: ECG 11/10/2017:  SR with PAT     Echo 10/16/2017:  Rhythm appears to be atrial fibrillations.   Left ventricular systolic function is normal with the ejection fraction estimated at 55%.   No regional wall motion abnormalities.   There is mild concentric left ventricular hypertrophy.   Left ventricular diastolic filling pressure is indeterminate secondary to arrhythmia.   The right ventricle is mildly enlarged.   Right atrial size is mildly dilated . No significant valvular heart disease. CARDIOLOGY LABS:   CBC: No results for input(s): WBC, HGB, HCT, PLT in the last 72 hours. BMP: No results for input(s): NA, K, CO2, BUN, CREATININE, LABGLOM, GLUCOSE in the last 72 hours. PT/INR: No results for input(s): PROTIME, INR in the last 72 hours. APTT:No results for input(s): APTT in the last 72 hours. FASTING LIPID PANEL:  Lab Results   Component Value Date    HDL 53 11/11/2017    HDL 54 01/05/2012    LDLCALC 103 11/11/2017    TRIG 88 11/11/2017     LIVER PROFILE:No results for input(s): AST, ALT, ALB in the last 72 hours. Assessment:   1. Sinus node dysfunction: s/p dual chamber pacemaker implant on 11/10/2017   -device check today shows normal function and PAT  2. Paroxysmal atrial tachycardia: noted in hospital, atenolol increased, now with 2 episodes after pacemaker implanted   -asymptomatic  3. HTN: borderline today, reports home BP as controlled  4. ANNA: management per pulmonary     Plan:   1. No changes today. Will monitor for recurrent PAT. 2. Monitor BP at home and notify office if consistently out of goal range  3.  Follow up as scheduled     Kosta Glover, [address]  [phone]

## 2017-12-21 NOTE — PROGRESS NOTES
No obvious thyroid mass. Neck diameter is increased   Cardiovascular: Normal rate, regular rhythm, normal heart sounds. No right ventricular heave. No lower extremity edema. Pulmonary/Chest: No wheezes. No rales. Chest wall is not dull to percussion. No accessory muscle usage or stridor. decreased BSI at bases  Abdominal: Soft. Bowel sounds present. No distension or hernia. No tenderness. Musculoskeletal: No cyanosis. No clubbing. No obvious joint deformity. Lymphadenopathy: No cervical or supraclavicular adenopathy. Skin: Skin is warm and dry. No rash or nodules on the exposed extremities. Psychiatric: Normal mood and affect. Behavior is normal.  No anxiety. Neurologic: Alert, awake and oriented. PERRL. Speech fluent      Sleep Medicine Data:  Sitting and reading: Slight chance of dozing  Watching TV: Slight chance of dozing  Sitting, inactive in a public place (e.g. a theatre or a meeting): Would never doze  As a passenger in a car for an hour without a break: Slight chance of dozing  Lying down to rest in the afternoon when circumstances permit: High chance of dozing  Sitting and talking to someone: Would never doze  Sitting quietly after a lunch without alcohol:  Moderate chance of dozing  In a car, while stopped for a few minutes in traffic: Would never doze  Total score: 8  Neck circumference: 14.25        Data:     EXAMINATION:   TWO VIEWS OF THE CHEST       11/10/2017 7:23 pm       COMPARISON:   Prior study(s) most recent 10/15/2017.       HISTORY:   ORDERING PHYSICIAN PROVIDED HISTORY: post device implant   TECHNOLOGIST PROVIDED HISTORY:   Technologist Provided Reason for Exam: post pace maker placement - unable to   lift left arm due to surgery       FINDINGS:   The heart is upper normal size.  Lungs are clear.  Pulmonary vessels are   normal.  Left subclavian ICD in satisfactory position on AP and lateral   views.  No pneumothorax.  Bilateral cervical ribs are evident.           Impression to   0.9 cm, likely benign given patient age and nodule size.  No supraclavicular   nor axillary lymphadenopathy.  1.7 cm x 1.5 cm epidermal inclusion cyst in   the right upper back.  Laxity of the included anterior abdominal wall   musculature.  No acute fractures nor suspicious osseous lesions.           Impression   1. No acute findings in the chest.  Specifically, no findings of pulmonary   embolism. 2. Mild cardiomegaly with mild right ventricular hypertrophy and mild   concentric left ventricular hypertrophy. 3. A few incidental findings above for which no follow-up is recommended. Results for Kanika Huston (MRN [id number]) as of 12/21/2017 13:55   Ref. Range 1/20/2014 09:24 7/30/2014 10:53 3/13/2015 09:34 1/31/2017 16:10 9/26/2017 09:39   TSH Latest Ref Range: 0.27 - 4.20 uIU/mL 2.47 2.28 3.45 2.35 2.68     Assessment:    1. ANNA (obstructive sleep apnea)    - CPAP  - CPAP  - CPAP  - CPAP  - CPAP continuous - nasal mask    2. Class 3 obesity due to excess calories without serious comorbidity with body mass index (BMI) of 45.0 to 49.9 in adult ([facility]      3.  Essential hypertension          Plan:   · Patient was told about the auscultative findings and implications  · Patient's recent CT scan of the chest was reviewed with the patient along with findings  · Patient has a thyroid nodule which may be benign and can be followed up by the PCP if so desired  · Patient's up sleep study results were discussed along with implications  · Patient was told about the pathophysiology of the disease process and its modifying factors  · Patient was also told about the consequences and sequelae of ANNA  · Patient was told about the various options available including AutoPap versus a CPAP titration in the sleep lab-to be decided upon by the patient and the insurance after discussion patient desired to try auto CPAP which is being ordered  · Patient does not need any supplemental oxygen along with the auto CPAP at this time  · If the patient is not improving from the sleep efficiency then patient may require a hypnotic-to be decided upon by PCP or Dr. Carla Freitas  · Patient was told about diet and lifestyle modifications  · Patient needs to lose weight  · Patient's blood pressure is on the high side and was told her to discuss with PCP/cardiology about titration of medications  · Patient is up-to-date on the flu shot  · Patient to follow-up in 2 months time or Dr. Carla Freitas for compliance evaluation

## 2017-12-21 NOTE — LETTER
[facility] - [address] [place] [id number]  Phone: [phone]  Fax: [phone]    Soto Olguin CNP        December 28, 2017     Caryle Haymaker, [address] [address]    Patient: Philly Lauren  MR Number: [id number]  YOB: 1955  Date of Visit: 12/21/2017    Dear Dr. Caryle Haymaker:    I recently saw our mutual patient, listed above. Below are the relevant portions of my assessment and plan of care. [facility]   Electrophysiology  Note              Date:  December 21, 2017  Patient name: Philly Lauren  YOB: 1955    Primary Care physician: Caryle Haymaker, MD    HISTORY OF PRESENT ILLNESS: The patient is a 63 y.o.  female with a past medical history of sinus node dysfunction, syncope, PAT, HTN, and ANNA. She was admitted in 10/2017 with syncope. Echo showed an EF of 55%. She was discharged with a 30 day event monitor and multiple pauses were detected. She had a 6.4 second pause with syncope and had a dual chamber pacemaker implanted on 11/10/2017. PAT was detected during her hospital stay and she was restarted on atenolol. Today she is being seen for sinus node dysfunction. Her device check shows normal function and PAT. She complains of fatigue but denies chest pain, palpitations, shortness of breath, and dizziness. Past Medical History:   has a past medical history of HTN; SVT (supraventricular tachycardia) ([address] and Wrist fracture, closed, left, with routine healing, subsequent encounter. Past Surgical History:   has a past surgical history that includes Breast biopsy (Right, 2002) and cyst removal (Right). Home Medications:    Prior to Admission medications    Medication Sig Start Date End Date Taking?  Authorizing Provider   Biotin 10 MG tablet Take 10 mg by mouth 2 times daily    Historical Provider, MD terazosin (HYTRIN) 2 MG capsule TAKE 1 CAPSULE NIGHTLY 12/18/17   Cj Delong MD   atenolol (TENORMIN) 50 MG tablet Take 2 tablets by mouth daily 12/12/17   Adan Oneal CNP   meloxicam (MOBIC) 15 MG tablet TAKE (1) TABLET DAILY 9/14/17   Cj Delong MD   pravastatin (PRAVACHOL) 40 MG tablet TAKE (1) TABLET DAILY 9/14/17   Cj Delong MD   lisinopril (PRINIVIL;ZESTRIL) 40 MG tablet TAKE (1) TABLET DAILY 6/19/17   Cj Delong MD   augmented betamethasone dipropionate (DIPROLENE AF) 0.05 % cream APPLY TWICE A DAY TO THE AFFECTED AREA(S) 1/31/17   Cj Delong MD   aspirin 81 MG EC tablet Take 81 mg by mouth daily. Historical Provider, MD       Allergies:  Chlorthalidone    Social History:   reports that she quit smoking about 31 years ago. Her smoking use included Cigarettes. She has a 30.00 pack-year smoking history. She has never used smokeless tobacco. She reports that she drinks alcohol. She reports that she does not use drugs. Family History: family history includes Hypertension in her father; Parkinsonism in her father. Review of Systems   Constitutional: + fatigue  HENT: Negative. Eyes: Negative. Respiratory: Negative. Cardiovascular: see HPI  Gastrointestinal: Negative. Genitourinary: Negative. Musculoskeletal: Negative. Skin: Negative. Neurological: Negative. Hematological: Negative. Psychiatric/Behavioral: Negative. PHYSICAL EXAM:    Physical Examination:    BP (!) 140/84   Ht 5' 1\" (1.549 m)   Wt 247 lb (112 kg)   BMI 46.67 kg/m²       Constitutional and general appearance: alert, cooperative, no distress and appears stated age  [de-identified]: PERRL, no cervical lymphadenopathy. No masses palpable.  Normal oral mucosa  Respiratory:  · Normal excursion and expansion without use of accessory muscles  · Resp auscultation: Normal breath sounds without dullness or wheezing  Cardiovascular:  · The apical impulse is not displaced 3. HTN: borderline today, reports home BP as controlled  4. ANNA: management per pulmonary     Plan:   1. No changes today. Will monitor for recurrent PAT. 2. Monitor BP at home and notify office if consistently out of goal range  3. Follow up as scheduled     Kosta Glover, [address]  [phone]       If you have questions, please do not hesitate to call me. I look forward to following Day [facility] along with you.     Sincerely,        Kosta Glover, CNP

## 2017-12-26 PROBLEM — I47.19 PAROXYSMAL ATRIAL TACHYCARDIA: Status: ACTIVE | Noted: 2017-12-26

## 2017-12-26 PROBLEM — I47.1 PAROXYSMAL ATRIAL TACHYCARDIA (HCC): Status: ACTIVE | Noted: 2017-12-26

## 2017-12-28 NOTE — COMMUNICATION BODY
AREA(S) 1/31/17   Margraita Hood MD   aspirin 81 MG EC tablet Take 81 mg by mouth daily. Historical Provider, MD       Allergies:  Chlorthalidone    Social History:   reports that she quit smoking about 31 years ago. Her smoking use included Cigarettes. She has a 30.00 pack-year smoking history. She has never used smokeless tobacco. She reports that she drinks alcohol. She reports that she does not use drugs. Family History: family history includes Hypertension in her father; Parkinsonism in her father. Review of Systems   Constitutional: + fatigue  HENT: Negative. Eyes: Negative. Respiratory: Negative. Cardiovascular: see HPI  Gastrointestinal: Negative. Genitourinary: Negative. Musculoskeletal: Negative. Skin: Negative. Neurological: Negative. Hematological: Negative. Psychiatric/Behavioral: Negative. PHYSICAL EXAM:    Physical Examination:    BP (!) 140/84   Ht 5' 1\" (1.549 m)   Wt 247 lb (112 kg)   BMI 46.67 kg/m²       Constitutional and general appearance: alert, cooperative, no distress and appears stated age  [de-identified]: PERRL, no cervical lymphadenopathy. No masses palpable.  Normal oral mucosa  Respiratory:  · Normal excursion and expansion without use of accessory muscles  · Resp auscultation: Normal breath sounds without dullness or wheezing  Cardiovascular:  · The apical impulse is not displaced  · Heart tones are crisp and normal. Regular S1 and S2.  · Jugular venous pulsation Normal  · The carotid upstroke is normal in amplitude and contour without delay or bruit  · Peripheral pulses are symmetrical and full   Abdomen:  · No masses or tenderness  · Bowel sounds present  Extremities:  ·  No cyanosis or clubbing  ·  No lower extremity edema  ·  Skin: warm and dry; left upper chest incision is closed and healed  Neurological:  · Alert and oriented  · Moves all extremities well  · No abnormalities of mood, affect, memory, mentation, or behavior are noted    DATA:

## 2018-01-19 RX ORDER — LISINOPRIL 40 MG/1
TABLET ORAL
Qty: 30 TABLET | Refills: 5 | Status: SHIPPED | OUTPATIENT
Start: 2018-01-19 | End: 2018-08-25 | Stop reason: SDUPTHER

## 2018-01-25 ENCOUNTER — OFFICE VISIT (OUTPATIENT)
Dept: FAMILY MEDICINE CLINIC | Age: 63
End: 2018-01-25

## 2018-01-25 VITALS
DIASTOLIC BLOOD PRESSURE: 84 MMHG | SYSTOLIC BLOOD PRESSURE: 150 MMHG | TEMPERATURE: 97.6 F | WEIGHT: 247.8 LBS | OXYGEN SATURATION: 98 % | BODY MASS INDEX: 46.82 KG/M2 | HEART RATE: 66 BPM

## 2018-01-25 DIAGNOSIS — E78.00 HYPERCHOLESTEREMIA: ICD-10-CM

## 2018-01-25 DIAGNOSIS — R73.9 HYPERGLYCEMIA: Primary | ICD-10-CM

## 2018-01-25 DIAGNOSIS — Z99.89 OSA ON CPAP: ICD-10-CM

## 2018-01-25 DIAGNOSIS — Z95.0 PACEMAKER: ICD-10-CM

## 2018-01-25 DIAGNOSIS — I49.5 SINUS NODE DYSFUNCTION (HCC): ICD-10-CM

## 2018-01-25 DIAGNOSIS — I10 ESSENTIAL HYPERTENSION: ICD-10-CM

## 2018-01-25 DIAGNOSIS — G47.33 OSA ON CPAP: ICD-10-CM

## 2018-01-25 DIAGNOSIS — R55 SYNCOPE AND COLLAPSE: ICD-10-CM

## 2018-01-25 DIAGNOSIS — I47.1 PAROXYSMAL ATRIAL TACHYCARDIA (HCC): ICD-10-CM

## 2018-01-25 PROBLEM — L02.91 ABSCESS: Status: RESOLVED | Noted: 2017-11-10 | Resolved: 2018-01-25

## 2018-01-25 PROBLEM — M19.90 ARTHRITIS: Status: ACTIVE | Noted: 2018-01-25

## 2018-01-25 LAB
ALT SERPL-CCNC: 30 U/L (ref 10–40)
ANION GAP SERPL CALCULATED.3IONS-SCNC: 15 MMOL/L (ref 3–16)
BUN BLDV-MCNC: 15 MG/DL (ref 7–20)
CALCIUM SERPL-MCNC: 9.6 MG/DL (ref 8.3–10.6)
CHLORIDE BLD-SCNC: 108 MMOL/L (ref 99–110)
CHOLESTEROL, TOTAL: 200 MG/DL (ref 0–199)
CO2: 24 MMOL/L (ref 21–32)
CREAT SERPL-MCNC: 0.7 MG/DL (ref 0.6–1.2)
GFR AFRICAN AMERICAN: >60
GFR NON-AFRICAN AMERICAN: >60
GLUCOSE BLD-MCNC: 86 MG/DL (ref 70–99)
HDLC SERPL-MCNC: 65 MG/DL (ref 40–60)
LDL CHOLESTEROL CALCULATED: 108 MG/DL
POTASSIUM SERPL-SCNC: 4.4 MMOL/L (ref 3.5–5.1)
SODIUM BLD-SCNC: 147 MMOL/L (ref 136–145)
TRIGL SERPL-MCNC: 133 MG/DL (ref 0–150)
TSH REFLEX: 2.64 UIU/ML (ref 0.27–4.2)
VLDLC SERPL CALC-MCNC: 27 MG/DL

## 2018-01-25 PROCEDURE — 3014F SCREEN MAMMO DOC REV: CPT | Performed by: FAMILY MEDICINE

## 2018-01-25 PROCEDURE — 3017F COLORECTAL CA SCREEN DOC REV: CPT | Performed by: FAMILY MEDICINE

## 2018-01-25 PROCEDURE — 1036F TOBACCO NON-USER: CPT | Performed by: FAMILY MEDICINE

## 2018-01-25 PROCEDURE — 99214 OFFICE O/P EST MOD 30 MIN: CPT | Performed by: FAMILY MEDICINE

## 2018-01-25 PROCEDURE — 36415 COLL VENOUS BLD VENIPUNCTURE: CPT | Performed by: FAMILY MEDICINE

## 2018-01-25 PROCEDURE — G8417 CALC BMI ABV UP PARAM F/U: HCPCS | Performed by: FAMILY MEDICINE

## 2018-01-25 PROCEDURE — G8484 FLU IMMUNIZE NO ADMIN: HCPCS | Performed by: FAMILY MEDICINE

## 2018-01-25 PROCEDURE — G8427 DOCREV CUR MEDS BY ELIG CLIN: HCPCS | Performed by: FAMILY MEDICINE

## 2018-01-25 NOTE — PROGRESS NOTES
Subjective:  Stacy Morgan is here to discuss the following issues. She has a history of hyperglycemia. No polydipsia or polyuria. She has hypertension and her blood pressures have been borderline controlled recently. She is compliant with her antihypertensives. No chest pain or pressure. She has elevated cholesterol and is fasting for blood work. She had a recent episode of syncope and collapse on 2 occasions. A cardiac monitor revealed sinus node dysfunction and a pacemaker was placed. The procedure went well with no complications and she has had no subsequent lightheadedness syncope or palpitations. She continues to work closely with her cardiologist.  She has a history of tachycardia and is back on her higher dose atenolol and no tachycardic symptoms have occurred since then. No fever sweats or chills. She has sleep apnea and is now on CPAP and tolerates it well. She feels well rested  History   Smoking Status    Former Smoker    Packs/day: 3.00    Years: 10.00    Types: Cigarettes    Quit date: 3/2/1986   Smokeless Tobacco    Never Used   Allergies:     Chlorthalidone    Objective:  Pulse 66   Temp 97.6 °F (36.4 °C) (Oral)   Wt 247 lb 12.8 oz (112.4 kg)   SpO2 98%   BMI 46.82 kg/m²    No acute distress, heart regular rate and rhythm without murmur, lungs clear to auscultation easy effort, abdomen soft nondistended, no clubbing or cyanosis    Assessment:  1. Hyperglycemia    2. Essential hypertension    3. Hypercholesteremia    4. Syncope and collapse    5. Sinus node dysfunction (HCC)    6. Pacemaker    7. Paroxysmal atrial tachycardia ([address]    8. ANNA on CPAP            Plan:  Labs ordered  Monitor blood pressures at home for one week and call with results  Continue current medicines  Continue to work with cardiologist and pulmonologist  Follow-up in 4 months  \"Healthy Family Handout\" provided  Avoid exposure to all tobacco products.   Read and consider all information provided by the pharmacy regarding prescribed medications before use  Careful medical compliance  Proper diet and weight management   Otherwise continue current treatment plan  Call or return if symptoms are not well controlled  Go to ED if severe/significant symptoms occur    All medical conditions for this patient are stable unless otherwise indicated    Rexann Schlatter MD    This note was transcribed using a voice recognition software system. Proper technique and careful oversight were used to increase transcription accuracy but inadvertent errors may be present.

## 2018-01-26 LAB
ESTIMATED AVERAGE GLUCOSE: 105.4 MG/DL
HBA1C MFR BLD: 5.3 %

## 2018-02-13 ENCOUNTER — TELEPHONE (OUTPATIENT)
Dept: PULMONOLOGY | Age: 63
End: 2018-02-13

## 2018-02-23 ENCOUNTER — OFFICE VISIT (OUTPATIENT)
Dept: CARDIOLOGY CLINIC | Age: 63
End: 2018-02-23

## 2018-02-23 ENCOUNTER — PROCEDURE VISIT (OUTPATIENT)
Dept: CARDIOLOGY CLINIC | Age: 63
End: 2018-02-23

## 2018-02-23 VITALS
BODY MASS INDEX: 46.63 KG/M2 | HEART RATE: 75 BPM | HEIGHT: 61 IN | DIASTOLIC BLOOD PRESSURE: 82 MMHG | SYSTOLIC BLOOD PRESSURE: 182 MMHG | WEIGHT: 247 LBS

## 2018-02-23 DIAGNOSIS — I49.5 SINUS NODE DYSFUNCTION (HCC): ICD-10-CM

## 2018-02-23 DIAGNOSIS — I49.5 SINUS NODE DYSFUNCTION (HCC): Primary | ICD-10-CM

## 2018-02-23 DIAGNOSIS — Z95.0 PACEMAKER: Primary | ICD-10-CM

## 2018-02-23 PROCEDURE — 99214 OFFICE O/P EST MOD 30 MIN: CPT | Performed by: INTERNAL MEDICINE

## 2018-02-23 PROCEDURE — G8417 CALC BMI ABV UP PARAM F/U: HCPCS | Performed by: INTERNAL MEDICINE

## 2018-02-23 PROCEDURE — G8484 FLU IMMUNIZE NO ADMIN: HCPCS | Performed by: INTERNAL MEDICINE

## 2018-02-23 PROCEDURE — 3017F COLORECTAL CA SCREEN DOC REV: CPT | Performed by: INTERNAL MEDICINE

## 2018-02-23 PROCEDURE — 1036F TOBACCO NON-USER: CPT | Performed by: INTERNAL MEDICINE

## 2018-02-23 PROCEDURE — 3014F SCREEN MAMMO DOC REV: CPT | Performed by: INTERNAL MEDICINE

## 2018-02-23 PROCEDURE — G8427 DOCREV CUR MEDS BY ELIG CLIN: HCPCS | Performed by: INTERNAL MEDICINE

## 2018-02-23 PROCEDURE — 93280 PM DEVICE PROGR EVAL DUAL: CPT | Performed by: INTERNAL MEDICINE

## 2018-02-23 NOTE — PROGRESS NOTES
abnormal bruising or bleeding, blood clots or  swollen lymph nodes. · Allergic/Immunologic: No nasal congestion or hives. Physical Examination:    BP (!) 182/82   Pulse 75   Ht 5' 1\" (1.549 m)   Wt 247 lb (112 kg)   BMI 46.67 kg/m²      Constitutional and General Appearance:    alert, cooperative, no distress and appears stated age  [de-identified]:    PERRLA, no cervical lymphadenopathy. No masses palpable. Normal oral  mucosa  Respiratory:  · Normal excursion and expansion without use of accessory muscles  · Resp Auscultation: Normal breath sounds without dullness or wheezing  Cardiovascular:  · The apical impulse is not displaced  · Heart tones are crisp and normal. regular S1 and S2.  · Jugular venous pulsation Normal  · The carotid upstroke is normal in amplitude and contour without delay or bruit  · Peripheral pulses are symmetrical and full  Abdomen:  · No masses or tenderness  · Bowel sounds present  Extremities:  ·  No Cyanosis or Clubbing  ·  Lower extremity edema: No  · Skin: Warm and dry  Neurological:  · Alert and oriented. · Moves all extremities well  · No abnormalities of mood, affect, memory, mentation, or behavior are noted      DATA:    ECG:  normal EKG, normal sinus rhythm, unchanged from previous tracings. ECHO: 10/16/17  Summary   Rhythm appears to be atrial fibrillations. Left ventricular systolic function is normal with the ejection fraction   estimated at 55%. No regional wall motion abnormalities. There is mild concentric left ventricular hypertrophy. Left ventricular diastolic filling pressure is indeterminate secondary to   arrhythmia. The right ventricle is mildly enlarged. Right atrial size is mildly dilated . No significant valvular heart disease. IMPRESSION:    1. SVT (supraventricular tachycardia) ([address]( I have not confirmed)   2. Syncope and collapse    3. Essential hypertension    4.  Class 3 obesity due to excess calories without serious comorbidity with body mass

## 2018-02-23 NOTE — PATIENT INSTRUCTIONS
RECOMMENDATIONS:  1. Doing well from a cardiac standpoint. 2. Continue current medications. 3. Remain as active as possible. 4. Follow up with Sherrie Ordonez CNP in 6 months.

## 2018-03-04 NOTE — COMMUNICATION BODY
abnormal bruising or bleeding, blood clots or  swollen lymph nodes. · Allergic/Immunologic: No nasal congestion or hives. Physical Examination:    BP (!) 182/82   Pulse 75   Ht 5' 1\" (1.549 m)   Wt 247 lb (112 kg)   BMI 46.67 kg/m²       Constitutional and General Appearance:    alert, cooperative, no distress and appears stated age  [de-identified]:    PERRLA, no cervical lymphadenopathy. No masses palpable. Normal oral  mucosa  Respiratory:  · Normal excursion and expansion without use of accessory muscles  · Resp Auscultation: Normal breath sounds without dullness or wheezing  Cardiovascular:  · The apical impulse is not displaced  · Heart tones are crisp and normal. regular S1 and S2.  · Jugular venous pulsation Normal  · The carotid upstroke is normal in amplitude and contour without delay or bruit  · Peripheral pulses are symmetrical and full  Abdomen:  · No masses or tenderness  · Bowel sounds present  Extremities:  ·  No Cyanosis or Clubbing  ·  Lower extremity edema: No  · Skin: Warm and dry  Neurological:  · Alert and oriented. · Moves all extremities well  · No abnormalities of mood, affect, memory, mentation, or behavior are noted      DATA:    ECG:  normal EKG, normal sinus rhythm, unchanged from previous tracings. ECHO: 10/16/17  Summary   Rhythm appears to be atrial fibrillations. Left ventricular systolic function is normal with the ejection fraction   estimated at 55%. No regional wall motion abnormalities. There is mild concentric left ventricular hypertrophy. Left ventricular diastolic filling pressure is indeterminate secondary to   arrhythmia. The right ventricle is mildly enlarged. Right atrial size is mildly dilated . No significant valvular heart disease. IMPRESSION:    1. SVT (supraventricular tachycardia) ([address]( I have not confirmed)   2. Syncope and collapse    3. Essential hypertension    4.  Class 3 obesity due to excess calories without serious comorbidity with body mass index (BMI) of 45.0 to 49.9 in adult ([facility] [address]    5. ANNA (obstructive sleep apnea) ? ? Sinus node dysfunction s/p pacemaker       RECOMMENDATIONS:  1. Doing well from a cardiac standpoint. 2. Continue current medications. 3. Remain as active as possible. 4. Follow up with Nona Hull CNP in 6 months. Obesity: The patient is asked to make an attempt to improve diet and exercise patterns to aid in medical management of this problem. QUALITY MEASURES  1. Tobacco Cessation Counseling: NA  2. Retake of BP if >140/90:   NA  3. Documentation to PCP/referring for new patient:  Sent to PCP at close of office visit  4. CAD patient on anti-platelet: NA  5. CAD patient on STATIN therapy:  NA  6. Patient with CHF and aFib on anticoagulation:  NA       All questions and concerns were addressed to the patient/family. Alternatives to my treatment were discussed. BRIDGETT Reardon F.A.C.C. Electrophysiology  [place] [address]. [address].   [facility] [address]  Phone: [phone]  Fax: [phone]

## 2018-03-05 ENCOUNTER — TELEPHONE (OUTPATIENT)
Dept: FAMILY MEDICINE CLINIC | Age: 63
End: 2018-03-05

## 2018-03-15 ENCOUNTER — OFFICE VISIT (OUTPATIENT)
Dept: PULMONOLOGY | Age: 63
End: 2018-03-15

## 2018-03-15 VITALS
BODY MASS INDEX: 47.77 KG/M2 | WEIGHT: 253 LBS | TEMPERATURE: 98.1 F | HEART RATE: 60 BPM | DIASTOLIC BLOOD PRESSURE: 74 MMHG | OXYGEN SATURATION: 98 % | RESPIRATION RATE: 16 BRPM | HEIGHT: 61 IN | SYSTOLIC BLOOD PRESSURE: 134 MMHG

## 2018-03-15 DIAGNOSIS — G47.33 OSA ON CPAP: Primary | ICD-10-CM

## 2018-03-15 DIAGNOSIS — Z99.89 OSA ON CPAP: Primary | ICD-10-CM

## 2018-03-15 DIAGNOSIS — I10 ESSENTIAL HYPERTENSION: ICD-10-CM

## 2018-03-15 DIAGNOSIS — J30.89 CHRONIC NON-SEASONAL ALLERGIC RHINITIS, UNSPECIFIED TRIGGER: ICD-10-CM

## 2018-03-15 PROCEDURE — 3014F SCREEN MAMMO DOC REV: CPT | Performed by: INTERNAL MEDICINE

## 2018-03-15 PROCEDURE — 99213 OFFICE O/P EST LOW 20 MIN: CPT | Performed by: INTERNAL MEDICINE

## 2018-03-15 PROCEDURE — G8482 FLU IMMUNIZE ORDER/ADMIN: HCPCS | Performed by: INTERNAL MEDICINE

## 2018-03-15 PROCEDURE — G8427 DOCREV CUR MEDS BY ELIG CLIN: HCPCS | Performed by: INTERNAL MEDICINE

## 2018-03-15 PROCEDURE — G8417 CALC BMI ABV UP PARAM F/U: HCPCS | Performed by: INTERNAL MEDICINE

## 2018-03-15 PROCEDURE — 1036F TOBACCO NON-USER: CPT | Performed by: INTERNAL MEDICINE

## 2018-03-15 PROCEDURE — 3017F COLORECTAL CA SCREEN DOC REV: CPT | Performed by: INTERNAL MEDICINE

## 2018-03-15 ASSESSMENT — SLEEP AND FATIGUE QUESTIONNAIRES
HOW LIKELY ARE YOU TO NOD OFF OR FALL ASLEEP WHILE SITTING AND READING: 2
ESS TOTAL SCORE: 6
HOW LIKELY ARE YOU TO NOD OFF OR FALL ASLEEP WHEN YOU ARE A PASSENGER IN A CAR FOR AN HOUR WITHOUT A BREAK: 0
HOW LIKELY ARE YOU TO NOD OFF OR FALL ASLEEP WHILE SITTING INACTIVE IN A PUBLIC PLACE: 0
HOW LIKELY ARE YOU TO NOD OFF OR FALL ASLEEP WHILE SITTING AND TALKING TO SOMEONE: 0
HOW LIKELY ARE YOU TO NOD OFF OR FALL ASLEEP WHILE SITTING QUIETLY AFTER LUNCH WITHOUT ALCOHOL: 1
HOW LIKELY ARE YOU TO NOD OFF OR FALL ASLEEP WHILE WATCHING TV: 1
HOW LIKELY ARE YOU TO NOD OFF OR FALL ASLEEP WHILE LYING DOWN TO REST IN THE AFTERNOON WHEN CIRCUMSTANCES PERMIT: 2
NECK CIRCUMFERENCE (INCHES): 14.25
HOW LIKELY ARE YOU TO NOD OFF OR FALL ASLEEP IN A CAR, WHILE STOPPED FOR A FEW MINUTES IN TRAFFIC: 0

## 2018-03-15 NOTE — PROGRESS NOTES
midline. No obvious mass. Short and large neck  Resp: No accessory muscle use. No crackles. No wheezes. No rhonchi. CV: Regular rate. Regular rhythm. No murmur or rub. No edema. GI: Deferred. Skin: Warm, dry, normal texture and turgor. No nodule on exposed extremities. Lymph: No cervical LAD. No supraclavicular LAD. M/S: No cyanosis. No clubbing. No joint deformity. Neuro: Moves all four extremities. Psych: Oriented x 3. No anxiety. Awake. Alert. Intact judgement and insight. Current Outpatient Prescriptions   Medication Sig Dispense Refill    lisinopril (PRINIVIL;ZESTRIL) 40 MG tablet TAKE (1) TABLET DAILY 30 tablet 5    Biotin 10 MG tablet Take 10 mg by mouth 2 times daily      terazosin (HYTRIN) 2 MG capsule TAKE 1 CAPSULE NIGHTLY 30 capsule 5    atenolol (TENORMIN) 50 MG tablet Take 2 tablets by mouth daily 60 tablet 11    meloxicam (MOBIC) 15 MG tablet TAKE (1) TABLET DAILY 30 tablet 5    pravastatin (PRAVACHOL) 40 MG tablet TAKE (1) TABLET DAILY 30 tablet 5    augmented betamethasone dipropionate (DIPROLENE AF) 0.05 % cream APPLY TWICE A DAY TO THE AFFECTED AREA(S) 30 g 5    aspirin 81 MG EC tablet Take 81 mg by mouth daily. No current facility-administered medications for this visit. Data Reviewed:   CBC and Renal reviewed  Last CBC  Lab Results   Component Value Date    WBC 7.3 11/11/2017    RBC 5.23 11/11/2017    HGB 15.1 11/11/2017    MCV 87.7 11/11/2017     11/11/2017     Last Renal  Lab Results   Component Value Date     01/25/2018    K 4.4 01/25/2018     01/25/2018    CO2 24 01/25/2018    CO2 23 11/11/2017    CO2 23 11/10/2017    BUN 15 01/25/2018    CREATININE 0.7 01/25/2018    GLUCOSE 86 01/25/2018    CALCIUM 9.6 01/25/2018       Last ABG  POC Blood Gas: No results found for: POCPH, POCPCO2, POCPO2, POCHCO3, NBEA, BXCK6FXF  No results for input(s): PH, PCO2, PO2, HCO3, BE, O2SAT in the last 72 hours.     Chest imaging reports were reviewed and imaging was reviewed by me    Assessment:     · ANNA, EDS  · Poor sleep hygiene  · Syncope  · Obesity  ·     Plan:      1. Obstructive sleep apnea, excessive daytime sleepiness  Doing extremely well on AutoPap 520 cm H2O, extremely good compliance and very good AHI. She is cleaning the humidifier, mask and hoses as recommended. She is gradually getting more acclimated. She looks after her grandchildren in the daytime, they're going to go to school, and she plans to volunteer at the school to keep herself busy. I encouraged her to do this as she would avoid daytime napping and it may help with weight loss. 2.  Allergic rhinitis, \"laryngitis\", bronchitis  Was unlikely to be related to her CPAP, although nasal stuffiness is not uncommon. I suggested she try Zyrtec at bedtime, failing which she could try nasal steroid at bedtime. 3. Poor sleep hygiene  Regularity of sleep and wake cycle was discussed with her. 4. Syncope, unspecified syncope type  Followed by Dr. Brooke Lozano, pacemaker placed and functioning well    5. Class 3 obesity due to excess calories without serious comorbidity with body mass index (BMI) of 45.0 to 49.9 in adult [facility])  Should make attempts to lose weight with caloric restriction and exercise. She will use her treadmill trying to lose weight. RTC one year, call if problems.

## 2018-04-23 RX ORDER — MELOXICAM 15 MG/1
TABLET ORAL
Qty: 30 TABLET | Refills: 3 | Status: SHIPPED | OUTPATIENT
Start: 2018-04-23 | End: 2018-09-22 | Stop reason: SDUPTHER

## 2018-04-23 RX ORDER — PRAVASTATIN SODIUM 40 MG
TABLET ORAL
Qty: 30 TABLET | Refills: 3 | Status: SHIPPED | OUTPATIENT
Start: 2018-04-23 | End: 2018-09-22 | Stop reason: SDUPTHER

## 2018-04-26 ENCOUNTER — HOSPITAL ENCOUNTER (OUTPATIENT)
Dept: MAMMOGRAPHY | Age: 63
Discharge: HOME | End: 2018-04-26
Attending: FAMILY MEDICINE | Admitting: FAMILY MEDICINE

## 2018-04-26 DIAGNOSIS — Z12.31 ENCOUNTER FOR SCREENING MAMMOGRAM FOR MALIGNANT NEOPLASM OF BREAST: ICD-10-CM

## 2018-05-25 ENCOUNTER — OFFICE VISIT (OUTPATIENT)
Dept: FAMILY MEDICINE CLINIC | Age: 63
End: 2018-05-25

## 2018-05-25 VITALS
BODY MASS INDEX: 48.86 KG/M2 | OXYGEN SATURATION: 97 % | SYSTOLIC BLOOD PRESSURE: 146 MMHG | TEMPERATURE: 98.1 F | HEART RATE: 60 BPM | DIASTOLIC BLOOD PRESSURE: 81 MMHG | WEIGHT: 258.6 LBS

## 2018-05-25 DIAGNOSIS — I10 ESSENTIAL HYPERTENSION: ICD-10-CM

## 2018-05-25 DIAGNOSIS — R73.9 HYPERGLYCEMIA: ICD-10-CM

## 2018-05-25 DIAGNOSIS — Z99.89 OSA ON CPAP: Primary | ICD-10-CM

## 2018-05-25 DIAGNOSIS — E78.00 HYPERCHOLESTEREMIA: ICD-10-CM

## 2018-05-25 DIAGNOSIS — Z95.0 PACEMAKER: ICD-10-CM

## 2018-05-25 DIAGNOSIS — G47.33 OSA ON CPAP: Primary | ICD-10-CM

## 2018-05-25 LAB
ALT SERPL-CCNC: 21 U/L (ref 10–40)
ANION GAP SERPL CALCULATED.3IONS-SCNC: 15 MMOL/L (ref 3–16)
BUN BLDV-MCNC: 18 MG/DL (ref 7–20)
CALCIUM SERPL-MCNC: 9.3 MG/DL (ref 8.3–10.6)
CHLORIDE BLD-SCNC: 103 MMOL/L (ref 99–110)
CHOLESTEROL, TOTAL: 202 MG/DL (ref 0–199)
CO2: 25 MMOL/L (ref 21–32)
CREAT SERPL-MCNC: 0.8 MG/DL (ref 0.6–1.2)
GFR AFRICAN AMERICAN: >60
GFR NON-AFRICAN AMERICAN: >60
GLUCOSE BLD-MCNC: 83 MG/DL (ref 70–99)
HDLC SERPL-MCNC: 58 MG/DL (ref 40–60)
LDL CHOLESTEROL CALCULATED: 119 MG/DL
POTASSIUM SERPL-SCNC: 4.2 MMOL/L (ref 3.5–5.1)
SODIUM BLD-SCNC: 143 MMOL/L (ref 136–145)
TRIGL SERPL-MCNC: 127 MG/DL (ref 0–150)
TSH REFLEX: 2.58 UIU/ML (ref 0.27–4.2)
VLDLC SERPL CALC-MCNC: 25 MG/DL

## 2018-05-25 PROCEDURE — G8417 CALC BMI ABV UP PARAM F/U: HCPCS | Performed by: FAMILY MEDICINE

## 2018-05-25 PROCEDURE — 36415 COLL VENOUS BLD VENIPUNCTURE: CPT | Performed by: FAMILY MEDICINE

## 2018-05-25 PROCEDURE — 99214 OFFICE O/P EST MOD 30 MIN: CPT | Performed by: FAMILY MEDICINE

## 2018-05-25 PROCEDURE — 1036F TOBACCO NON-USER: CPT | Performed by: FAMILY MEDICINE

## 2018-05-25 PROCEDURE — G8427 DOCREV CUR MEDS BY ELIG CLIN: HCPCS | Performed by: FAMILY MEDICINE

## 2018-05-25 PROCEDURE — 3017F COLORECTAL CA SCREEN DOC REV: CPT | Performed by: FAMILY MEDICINE

## 2018-05-26 LAB
ESTIMATED AVERAGE GLUCOSE: 102.5 MG/DL
HBA1C MFR BLD: 5.2 %

## 2018-05-29 ENCOUNTER — NURSE ONLY (OUTPATIENT)
Dept: CARDIOLOGY CLINIC | Age: 63
End: 2018-05-29

## 2018-05-29 DIAGNOSIS — I49.5 SINUS NODE DYSFUNCTION (HCC): ICD-10-CM

## 2018-05-29 DIAGNOSIS — Z95.0 PACEMAKER: Primary | ICD-10-CM

## 2018-05-29 PROCEDURE — 93296 REM INTERROG EVL PM/IDS: CPT | Performed by: INTERNAL MEDICINE

## 2018-05-29 PROCEDURE — 93294 REM INTERROG EVL PM/LDLS PM: CPT | Performed by: INTERNAL MEDICINE

## 2018-07-23 RX ORDER — TERAZOSIN 2 MG/1
CAPSULE ORAL
Qty: 30 CAPSULE | Refills: 3 | Status: SHIPPED | OUTPATIENT
Start: 2018-07-23 | End: 2018-12-21 | Stop reason: SDUPTHER

## 2018-08-21 ENCOUNTER — OFFICE VISIT (OUTPATIENT)
Dept: CARDIOLOGY CLINIC | Age: 63
End: 2018-08-21

## 2018-08-21 ENCOUNTER — PROCEDURE VISIT (OUTPATIENT)
Dept: CARDIOLOGY CLINIC | Age: 63
End: 2018-08-21

## 2018-08-21 VITALS
DIASTOLIC BLOOD PRESSURE: 76 MMHG | OXYGEN SATURATION: 98 % | BODY MASS INDEX: 48.33 KG/M2 | WEIGHT: 256 LBS | HEIGHT: 61 IN | HEART RATE: 62 BPM | SYSTOLIC BLOOD PRESSURE: 130 MMHG

## 2018-08-21 DIAGNOSIS — Z95.0 PACEMAKER: Primary | ICD-10-CM

## 2018-08-21 DIAGNOSIS — I10 ESSENTIAL HYPERTENSION: ICD-10-CM

## 2018-08-21 DIAGNOSIS — I49.5 SINUS NODE DYSFUNCTION (HCC): Primary | ICD-10-CM

## 2018-08-21 DIAGNOSIS — I47.1 PAROXYSMAL ATRIAL TACHYCARDIA (HCC): ICD-10-CM

## 2018-08-21 DIAGNOSIS — I49.5 SINUS NODE DYSFUNCTION (HCC): ICD-10-CM

## 2018-08-21 PROCEDURE — 93280 PM DEVICE PROGR EVAL DUAL: CPT | Performed by: INTERNAL MEDICINE

## 2018-08-21 PROCEDURE — 3017F COLORECTAL CA SCREEN DOC REV: CPT | Performed by: NURSE PRACTITIONER

## 2018-08-21 PROCEDURE — 99214 OFFICE O/P EST MOD 30 MIN: CPT | Performed by: NURSE PRACTITIONER

## 2018-08-21 PROCEDURE — G8417 CALC BMI ABV UP PARAM F/U: HCPCS | Performed by: NURSE PRACTITIONER

## 2018-08-21 PROCEDURE — G8427 DOCREV CUR MEDS BY ELIG CLIN: HCPCS | Performed by: NURSE PRACTITIONER

## 2018-08-21 PROCEDURE — 1036F TOBACCO NON-USER: CPT | Performed by: NURSE PRACTITIONER

## 2018-08-21 NOTE — LETTER
Gisella Patricia MD   pravastatin (PRAVACHOL) 40 MG tablet TAKE (1) TABLET DAILY 4/23/18  Yes Liseth Arroyo MD   meloxicam (MOBIC) 15 MG tablet TAKE (1) TABLET DAILY 4/23/18  Yes Liseth Arroyo MD   lisinopril (PRINIVIL;ZESTRIL) 40 MG tablet TAKE (1) TABLET DAILY 1/19/18  Yes Liseth Arroyo MD   atenolol (TENORMIN) 50 MG tablet Take 2 tablets by mouth daily 12/12/17  Yes Rupert Maizes, APRN - CNP   augmented betamethasone dipropionate (DIPROLENE AF) 0.05 % cream APPLY TWICE A DAY TO THE AFFECTED AREA(S) 1/31/17  Yes Liseth Arroyo MD   aspirin 81 MG EC tablet Take 81 mg by mouth daily. Yes Historical Provider, MD       Allergies:  Chlorthalidone    Social History:   reports that she quit smoking about 32 years ago. Her smoking use included Cigarettes. She has a 30.00 pack-year smoking history. She has never used smokeless tobacco. She reports that she drinks alcohol. She reports that she does not use drugs. Family History: family history includes Hypertension in her father; Parkinsonism in her father. Review of Systems   Constitutional: Negative  HENT: Negative. Eyes: Negative. Respiratory: Negative. Cardiovascular: see HPI  Gastrointestinal: Negative. Genitourinary: Negative. Musculoskeletal: Negative. Skin: Negative. Neurological: Negative. Hematological: Negative. Psychiatric/Behavioral: Negative. PHYSICAL EXAM:    Physical Examination:    BP (!) 140/94   Pulse 62   Ht 5' 1\" (1.549 m)   Wt 256 lb (116.1 kg)   SpO2 98%   BMI 48.37 kg/m²       Constitutional and general appearance: alert, cooperative, no distress and appears stated age  HEENT: PERRL, no cervical lymphadenopathy. No masses palpable.  Normal oral mucosa  Respiratory:  · Normal excursion and expansion without use of accessory muscles  · Resp auscultation: Normal breath sounds without dullness or wheezing  Cardiovascular:  · The apical impulse is not displaced · Heart tones are crisp and normal. Regular S1 and S2.  · Jugular venous pulsation Normal  · The carotid upstroke is normal in amplitude and contour without delay or bruit  · Peripheral pulses are symmetrical and full   Abdomen:  · No masses or tenderness  · Bowel sounds present  Extremities:  ·  No cyanosis or clubbing  ·  No lower extremity edema  ·  Skin: warm and dry; left upper chest incision is closed and healed  Neurological:  · Alert and oriented  · Moves all extremities well  · No abnormalities of mood, affect, memory, mentation, or behavior are noted    DATA:    ECG 11/10/2017:  SR with PAT     Echo 10/16/2017:  Rhythm appears to be atrial fibrillations.   Left ventricular systolic function is normal with the ejection fraction estimated at 55%.   No regional wall motion abnormalities.   There is mild concentric left ventricular hypertrophy.   Left ventricular diastolic filling pressure is indeterminate secondary to arrhythmia.   The right ventricle is mildly enlarged.   Right atrial size is mildly dilated . No significant valvular heart disease. CARDIOLOGY LABS:   CBC: No results for input(s): WBC, HGB, HCT, PLT in the last 72 hours. BMP: No results for input(s): NA, K, CO2, BUN, CREATININE, LABGLOM, GLUCOSE in the last 72 hours. PT/INR: No results for input(s): PROTIME, INR in the last 72 hours. APTT:No results for input(s): APTT in the last 72 hours. FASTING LIPID PANEL:  Lab Results   Component Value Date    HDL 58 05/25/2018    HDL 54 01/05/2012    LDLCALC 119 05/25/2018    TRIG 127 05/25/2018     LIVER PROFILE:No results for input(s): AST, ALT, ALB in the last 72 hours. Assessment:   1. Sinus node dysfunction: s/p dual chamber pacemaker implant on 11/10/2017   -device check today shows normal function  2. Paroxysmal atrial tachycardia: noted in hospital, atenolol increased   -asymptomatic  3. HTN: white coat component; reports controlled at home  4.  ANNA: management per pulmonary

## 2018-08-21 NOTE — PROGRESS NOTES
AREA(S) 1/31/17  Yes Ranjeet Topete MD   aspirin 81 MG EC tablet Take 81 mg by mouth daily. Yes Historical Provider, MD       Allergies:  Chlorthalidone    Social History:   reports that she quit smoking about 32 years ago. Her smoking use included Cigarettes. She has a 30.00 pack-year smoking history. She has never used smokeless tobacco. She reports that she drinks alcohol. She reports that she does not use drugs. Family History: family history includes Hypertension in her father; Parkinsonism in her father. Review of Systems   Constitutional: Negative  HENT: Negative. Eyes: Negative. Respiratory: Negative. Cardiovascular: see HPI  Gastrointestinal: Negative. Genitourinary: Negative. Musculoskeletal: Negative. Skin: Negative. Neurological: Negative. Hematological: Negative. Psychiatric/Behavioral: Negative. PHYSICAL EXAM:    Physical Examination:    BP (!) 140/94   Pulse 62   Ht 5' 1\" (1.549 m)   Wt 256 lb (116.1 kg)   SpO2 98%   BMI 48.37 kg/m²      Constitutional and general appearance: alert, cooperative, no distress and appears stated age  HEENT: PERRL, no cervical lymphadenopathy. No masses palpable.  Normal oral mucosa  Respiratory:  · Normal excursion and expansion without use of accessory muscles  · Resp auscultation: Normal breath sounds without dullness or wheezing  Cardiovascular:  · The apical impulse is not displaced  · Heart tones are crisp and normal. Regular S1 and S2.  · Jugular venous pulsation Normal  · The carotid upstroke is normal in amplitude and contour without delay or bruit  · Peripheral pulses are symmetrical and full   Abdomen:  · No masses or tenderness  · Bowel sounds present  Extremities:  ·  No cyanosis or clubbing  ·  No lower extremity edema  ·  Skin: warm and dry; left upper chest incision is closed and healed  Neurological:  · Alert and oriented  · Moves all extremities well  · No abnormalities of mood, affect, memory, mentation, or behavior are noted    DATA:    ECG 11/10/2017:  SR with PAT     Echo 10/16/2017:  Rhythm appears to be atrial fibrillations.   Left ventricular systolic function is normal with the ejection fraction estimated at 55%.   No regional wall motion abnormalities.   There is mild concentric left ventricular hypertrophy.   Left ventricular diastolic filling pressure is indeterminate secondary to arrhythmia.   The right ventricle is mildly enlarged.   Right atrial size is mildly dilated . No significant valvular heart disease. CARDIOLOGY LABS:   CBC: No results for input(s): WBC, HGB, HCT, PLT in the last 72 hours. BMP: No results for input(s): NA, K, CO2, BUN, CREATININE, LABGLOM, GLUCOSE in the last 72 hours. PT/INR: No results for input(s): PROTIME, INR in the last 72 hours. APTT:No results for input(s): APTT in the last 72 hours. FASTING LIPID PANEL:  Lab Results   Component Value Date    HDL 58 05/25/2018    HDL 54 01/05/2012    LDLCALC 119 05/25/2018    TRIG 127 05/25/2018     LIVER PROFILE:No results for input(s): AST, ALT, ALB in the last 72 hours. Assessment:   1. Sinus node dysfunction: s/p dual chamber pacemaker implant on 11/10/2017   -device check today shows normal function  2. Paroxysmal atrial tachycardia: noted in hospital, atenolol increased   -asymptomatic  3. HTN: white coat component; reports controlled at home  4. ANNA: management per pulmonary     Plan:   1. Continue lisinopril and atenolol  2. Monitor BP at home and call if persistently elevated (patient has a history of hypokalemia with chlorthalidone, could trial spironolactone)  3.  Follow up in 6 months     Michelle Gallagher, [address]  [phone]

## 2018-08-23 NOTE — COMMUNICATION BODY
AREA(S) 1/31/17  Yes [place] Client, MD   aspirin 81 MG EC tablet Take 81 mg by mouth daily. Yes Historical Provider, MD       Allergies:  Chlorthalidone    Social History:   reports that she quit smoking about 32 years ago. Her smoking use included Cigarettes. She has a 30.00 pack-year smoking history. She has never used smokeless tobacco. She reports that she drinks alcohol. She reports that she does not use drugs. Family History: family history includes Hypertension in her father; Parkinsonism in her father. Review of Systems   Constitutional: Negative  HENT: Negative. Eyes: Negative. Respiratory: Negative. Cardiovascular: see HPI  Gastrointestinal: Negative. Genitourinary: Negative. Musculoskeletal: Negative. Skin: Negative. Neurological: Negative. Hematological: Negative. Psychiatric/Behavioral: Negative. PHYSICAL EXAM:    Physical Examination:    BP (!) 140/94   Pulse 62   Ht 5' 1\" (1.549 m)   Wt 256 lb (116.1 kg)   SpO2 98%   BMI 48.37 kg/m²       Constitutional and general appearance: alert, cooperative, no distress and appears stated age  HEENT: PERRL, no cervical lymphadenopathy. No masses palpable.  Normal oral mucosa  Respiratory:  · Normal excursion and expansion without use of accessory muscles  · Resp auscultation: Normal breath sounds without dullness or wheezing  Cardiovascular:  · The apical impulse is not displaced  · Heart tones are crisp and normal. Regular S1 and S2.  · Jugular venous pulsation Normal  · The carotid upstroke is normal in amplitude and contour without delay or bruit  · Peripheral pulses are symmetrical and full   Abdomen:  · No masses or tenderness  · Bowel sounds present  Extremities:  ·  No cyanosis or clubbing  ·  No lower extremity edema  ·  Skin: warm and dry; left upper chest incision is closed and healed  Neurological:  · Alert and oriented  · Moves all extremities well  · No abnormalities of mood, affect, memory, mentation, or behavior are noted    DATA:    ECG 11/10/2017:  SR with PAT     Echo 10/16/2017:  Rhythm appears to be atrial fibrillations.   Left ventricular systolic function is normal with the ejection fraction estimated at 55%.   No regional wall motion abnormalities.   There is mild concentric left ventricular hypertrophy.   Left ventricular diastolic filling pressure is indeterminate secondary to arrhythmia.   The right ventricle is mildly enlarged.   Right atrial size is mildly dilated . No significant valvular heart disease. CARDIOLOGY LABS:   CBC: No results for input(s): WBC, HGB, HCT, PLT in the last 72 hours. BMP: No results for input(s): NA, K, CO2, BUN, CREATININE, LABGLOM, GLUCOSE in the last 72 hours. PT/INR: No results for input(s): PROTIME, INR in the last 72 hours. APTT:No results for input(s): APTT in the last 72 hours. FASTING LIPID PANEL:  Lab Results   Component Value Date    HDL 58 05/25/2018    HDL 54 01/05/2012    LDLCALC 119 05/25/2018    TRIG 127 05/25/2018     LIVER PROFILE:No results for input(s): AST, ALT, ALB in the last 72 hours. Assessment:   1. Sinus node dysfunction: s/p dual chamber pacemaker implant on 11/10/2017   -device check today shows normal function  2. Paroxysmal atrial tachycardia: noted in hospital, atenolol increased   -asymptomatic  3. HTN: white coat component; reports controlled at home  4. ANNA: management per pulmonary     Plan:   1. Continue lisinopril and atenolol  2. Monitor BP at home and call if persistently elevated (patient has a history of hypokalemia with chlorthalidone, could trial spironolactone)  3.  Follow up in 6 months     Sarah Giraldo, [address]  [phone]

## 2018-08-27 RX ORDER — LISINOPRIL 40 MG/1
TABLET ORAL
Qty: 30 TABLET | Refills: 5 | Status: SHIPPED | OUTPATIENT
Start: 2018-08-27 | End: 2019-05-16 | Stop reason: SDUPTHER

## 2018-09-24 RX ORDER — MELOXICAM 15 MG/1
TABLET ORAL
Qty: 30 TABLET | Refills: 3 | Status: SHIPPED | OUTPATIENT
Start: 2018-09-24 | End: 2019-02-21 | Stop reason: SDUPTHER

## 2018-09-24 RX ORDER — PRAVASTATIN SODIUM 40 MG
TABLET ORAL
Qty: 30 TABLET | Refills: 3 | Status: SHIPPED | OUTPATIENT
Start: 2018-09-24 | End: 2019-02-21 | Stop reason: SDUPTHER

## 2018-10-23 RX ORDER — PRAVASTATIN SODIUM 40 MG
TABLET ORAL
Qty: 30 TABLET | Refills: 0 | OUTPATIENT
Start: 2018-10-23

## 2018-11-13 ENCOUNTER — OFFICE VISIT (OUTPATIENT)
Dept: FAMILY MEDICINE CLINIC | Age: 63
End: 2018-11-13
Payer: COMMERCIAL

## 2018-11-13 VITALS
SYSTOLIC BLOOD PRESSURE: 147 MMHG | DIASTOLIC BLOOD PRESSURE: 85 MMHG | BODY MASS INDEX: 48.75 KG/M2 | WEIGHT: 258 LBS | HEART RATE: 60 BPM | TEMPERATURE: 98.4 F | OXYGEN SATURATION: 98 %

## 2018-11-13 DIAGNOSIS — Z99.89 OSA ON CPAP: ICD-10-CM

## 2018-11-13 DIAGNOSIS — Z95.0 PACEMAKER: ICD-10-CM

## 2018-11-13 DIAGNOSIS — I10 ESSENTIAL HYPERTENSION: Primary | ICD-10-CM

## 2018-11-13 DIAGNOSIS — R73.9 HYPERGLYCEMIA: ICD-10-CM

## 2018-11-13 DIAGNOSIS — G47.33 OSA ON CPAP: ICD-10-CM

## 2018-11-13 DIAGNOSIS — E78.00 HYPERCHOLESTEREMIA: ICD-10-CM

## 2018-11-13 PROBLEM — R55 SYNCOPE AND COLLAPSE: Status: RESOLVED | Noted: 2017-10-16 | Resolved: 2018-11-13

## 2018-11-13 LAB
ALT SERPL-CCNC: 26 U/L (ref 10–40)
ANION GAP SERPL CALCULATED.3IONS-SCNC: 14 MMOL/L (ref 3–16)
BUN BLDV-MCNC: 16 MG/DL (ref 7–20)
CALCIUM SERPL-MCNC: 9.5 MG/DL (ref 8.3–10.6)
CHLORIDE BLD-SCNC: 110 MMOL/L (ref 99–110)
CHOLESTEROL, TOTAL: 197 MG/DL (ref 0–199)
CO2: 24 MMOL/L (ref 21–32)
CREAT SERPL-MCNC: 0.7 MG/DL (ref 0.6–1.2)
GFR AFRICAN AMERICAN: >60
GFR NON-AFRICAN AMERICAN: >60
GLUCOSE BLD-MCNC: 85 MG/DL (ref 70–99)
HDLC SERPL-MCNC: 53 MG/DL (ref 40–60)
LDL CHOLESTEROL CALCULATED: 122 MG/DL
POTASSIUM SERPL-SCNC: 4.2 MMOL/L (ref 3.5–5.1)
SODIUM BLD-SCNC: 148 MMOL/L (ref 136–145)
TRIGL SERPL-MCNC: 111 MG/DL (ref 0–150)
VLDLC SERPL CALC-MCNC: 22 MG/DL

## 2018-11-13 PROCEDURE — 3017F COLORECTAL CA SCREEN DOC REV: CPT | Performed by: FAMILY MEDICINE

## 2018-11-13 PROCEDURE — 99214 OFFICE O/P EST MOD 30 MIN: CPT | Performed by: FAMILY MEDICINE

## 2018-11-13 PROCEDURE — G8482 FLU IMMUNIZE ORDER/ADMIN: HCPCS | Performed by: FAMILY MEDICINE

## 2018-11-13 PROCEDURE — G8417 CALC BMI ABV UP PARAM F/U: HCPCS | Performed by: FAMILY MEDICINE

## 2018-11-13 PROCEDURE — 90471 IMMUNIZATION ADMIN: CPT | Performed by: FAMILY MEDICINE

## 2018-11-13 PROCEDURE — 1036F TOBACCO NON-USER: CPT | Performed by: FAMILY MEDICINE

## 2018-11-13 PROCEDURE — 36415 COLL VENOUS BLD VENIPUNCTURE: CPT | Performed by: FAMILY MEDICINE

## 2018-11-13 PROCEDURE — 90688 IIV4 VACCINE SPLT 0.5 ML IM: CPT | Performed by: FAMILY MEDICINE

## 2018-11-13 PROCEDURE — G8427 DOCREV CUR MEDS BY ELIG CLIN: HCPCS | Performed by: FAMILY MEDICINE

## 2018-11-13 ASSESSMENT — PATIENT HEALTH QUESTIONNAIRE - PHQ9
SUM OF ALL RESPONSES TO PHQ QUESTIONS 1-9: 0
1. LITTLE INTEREST OR PLEASURE IN DOING THINGS: 0
2. FEELING DOWN, DEPRESSED OR HOPELESS: 0
SUM OF ALL RESPONSES TO PHQ QUESTIONS 1-9: 0
SUM OF ALL RESPONSES TO PHQ9 QUESTIONS 1 & 2: 0

## 2018-11-13 NOTE — PROGRESS NOTES
not well controlled  Go to ED if severe/significant symptoms occur    All medical conditions for this patient are stable unless otherwise indicated    Kyara José MD    This note was transcribed using a voice recognition software system. Proper technique and careful oversight were used to increase transcription accuracy but inadvertent errors may be present.

## 2018-11-14 LAB
ESTIMATED AVERAGE GLUCOSE: 99.7 MG/DL
HBA1C MFR BLD: 5.1 %

## 2018-11-27 ENCOUNTER — NURSE ONLY (OUTPATIENT)
Dept: CARDIOLOGY CLINIC | Age: 63
End: 2018-11-27
Payer: COMMERCIAL

## 2018-11-27 DIAGNOSIS — I49.5 SINUS NODE DYSFUNCTION (HCC): ICD-10-CM

## 2018-11-27 DIAGNOSIS — Z95.0 PACEMAKER: Primary | ICD-10-CM

## 2018-11-27 PROCEDURE — 93294 REM INTERROG EVL PM/LDLS PM: CPT | Performed by: INTERNAL MEDICINE

## 2018-11-27 PROCEDURE — 93296 REM INTERROG EVL PM/IDS: CPT | Performed by: INTERNAL MEDICINE

## 2018-12-21 RX ORDER — TERAZOSIN 2 MG/1
CAPSULE ORAL
Qty: 30 CAPSULE | Refills: 3 | Status: SHIPPED | OUTPATIENT
Start: 2018-12-21 | End: 2019-05-16 | Stop reason: SDUPTHER

## 2019-02-22 RX ORDER — PRAVASTATIN SODIUM 40 MG
TABLET ORAL
Qty: 30 TABLET | Refills: 3 | Status: SHIPPED | OUTPATIENT
Start: 2019-02-22 | End: 2019-07-17 | Stop reason: SDUPTHER

## 2019-02-22 RX ORDER — MELOXICAM 15 MG/1
TABLET ORAL
Qty: 30 TABLET | Refills: 3 | Status: SHIPPED | OUTPATIENT
Start: 2019-02-22 | End: 2019-07-17 | Stop reason: SDUPTHER

## 2019-02-26 ENCOUNTER — NURSE ONLY (OUTPATIENT)
Dept: CARDIOLOGY CLINIC | Age: 64
End: 2019-02-26
Payer: COMMERCIAL

## 2019-02-26 DIAGNOSIS — I49.5 SINUS NODE DYSFUNCTION (HCC): ICD-10-CM

## 2019-02-26 DIAGNOSIS — Z95.0 PACEMAKER: Primary | ICD-10-CM

## 2019-02-26 PROCEDURE — 93294 REM INTERROG EVL PM/LDLS PM: CPT | Performed by: INTERNAL MEDICINE

## 2019-02-26 PROCEDURE — 93296 REM INTERROG EVL PM/IDS: CPT | Performed by: INTERNAL MEDICINE

## 2019-03-20 ENCOUNTER — OFFICE VISIT (OUTPATIENT)
Dept: PULMONOLOGY | Age: 64
End: 2019-03-20
Payer: COMMERCIAL

## 2019-03-20 VITALS
TEMPERATURE: 98 F | BODY MASS INDEX: 49.09 KG/M2 | WEIGHT: 260 LBS | SYSTOLIC BLOOD PRESSURE: 166 MMHG | RESPIRATION RATE: 16 BRPM | OXYGEN SATURATION: 100 % | HEART RATE: 62 BPM | DIASTOLIC BLOOD PRESSURE: 88 MMHG | HEIGHT: 61 IN

## 2019-03-20 DIAGNOSIS — Z99.89 OSA ON CPAP: Primary | ICD-10-CM

## 2019-03-20 DIAGNOSIS — G47.33 OSA (OBSTRUCTIVE SLEEP APNEA): ICD-10-CM

## 2019-03-20 DIAGNOSIS — G47.33 OSA ON CPAP: Primary | ICD-10-CM

## 2019-03-20 DIAGNOSIS — I10 ESSENTIAL HYPERTENSION: ICD-10-CM

## 2019-03-20 PROCEDURE — 99213 OFFICE O/P EST LOW 20 MIN: CPT | Performed by: INTERNAL MEDICINE

## 2019-03-20 PROCEDURE — 3017F COLORECTAL CA SCREEN DOC REV: CPT | Performed by: INTERNAL MEDICINE

## 2019-03-20 PROCEDURE — G8417 CALC BMI ABV UP PARAM F/U: HCPCS | Performed by: INTERNAL MEDICINE

## 2019-03-20 PROCEDURE — G8427 DOCREV CUR MEDS BY ELIG CLIN: HCPCS | Performed by: INTERNAL MEDICINE

## 2019-03-20 PROCEDURE — 1036F TOBACCO NON-USER: CPT | Performed by: INTERNAL MEDICINE

## 2019-03-20 PROCEDURE — G8482 FLU IMMUNIZE ORDER/ADMIN: HCPCS | Performed by: INTERNAL MEDICINE

## 2019-03-20 ASSESSMENT — SLEEP AND FATIGUE QUESTIONNAIRES
HOW LIKELY ARE YOU TO NOD OFF OR FALL ASLEEP IN A CAR, WHILE STOPPED FOR A FEW MINUTES IN TRAFFIC: 0
HOW LIKELY ARE YOU TO NOD OFF OR FALL ASLEEP WHILE SITTING INACTIVE IN A PUBLIC PLACE: 0
HOW LIKELY ARE YOU TO NOD OFF OR FALL ASLEEP WHILE WATCHING TV: 1
HOW LIKELY ARE YOU TO NOD OFF OR FALL ASLEEP WHILE SITTING AND TALKING TO SOMEONE: 0
HOW LIKELY ARE YOU TO NOD OFF OR FALL ASLEEP WHILE SITTING QUIETLY AFTER LUNCH WITHOUT ALCOHOL: 1
HOW LIKELY ARE YOU TO NOD OFF OR FALL ASLEEP WHEN YOU ARE A PASSENGER IN A CAR FOR AN HOUR WITHOUT A BREAK: 0
ESS TOTAL SCORE: 4
HOW LIKELY ARE YOU TO NOD OFF OR FALL ASLEEP WHILE SITTING AND READING: 1
NECK CIRCUMFERENCE (INCHES): 14.25
HOW LIKELY ARE YOU TO NOD OFF OR FALL ASLEEP WHILE LYING DOWN TO REST IN THE AFTERNOON WHEN CIRCUMSTANCES PERMIT: 1

## 2019-03-21 RX ORDER — LISINOPRIL 40 MG/1
TABLET ORAL
Qty: 30 TABLET | Refills: 5 | Status: SHIPPED | OUTPATIENT
Start: 2019-03-21 | End: 2019-10-18

## 2019-04-26 ENCOUNTER — HOSPITAL ENCOUNTER (OUTPATIENT)
Dept: WOMENS IMAGING | Age: 64
Discharge: HOME | End: 2019-04-26
Payer: COMMERCIAL

## 2019-04-26 DIAGNOSIS — Z12.39 BREAST CANCER SCREENING: ICD-10-CM

## 2019-04-26 PROCEDURE — 77067 SCR MAMMO BI INCL CAD: CPT

## 2019-04-26 PROCEDURE — 77063 BREAST TOMOSYNTHESIS BI: CPT

## 2019-05-16 ENCOUNTER — OFFICE VISIT (OUTPATIENT)
Dept: FAMILY MEDICINE CLINIC | Age: 64
End: 2019-05-16
Payer: COMMERCIAL

## 2019-05-16 VITALS
BODY MASS INDEX: 48.56 KG/M2 | OXYGEN SATURATION: 98 % | WEIGHT: 257 LBS | SYSTOLIC BLOOD PRESSURE: 151 MMHG | DIASTOLIC BLOOD PRESSURE: 84 MMHG | TEMPERATURE: 97.8 F | HEART RATE: 60 BPM

## 2019-05-16 DIAGNOSIS — Z99.89 OSA ON CPAP: ICD-10-CM

## 2019-05-16 DIAGNOSIS — G47.33 OSA ON CPAP: ICD-10-CM

## 2019-05-16 DIAGNOSIS — E78.00 HYPERCHOLESTEREMIA: ICD-10-CM

## 2019-05-16 DIAGNOSIS — I10 ESSENTIAL HYPERTENSION: Primary | ICD-10-CM

## 2019-05-16 DIAGNOSIS — I47.1 PAROXYSMAL ATRIAL TACHYCARDIA (HCC): ICD-10-CM

## 2019-05-16 DIAGNOSIS — Z95.0 PACEMAKER: ICD-10-CM

## 2019-05-16 DIAGNOSIS — R73.9 HYPERGLYCEMIA: ICD-10-CM

## 2019-05-16 DIAGNOSIS — I49.5 SINUS NODE DYSFUNCTION (HCC): ICD-10-CM

## 2019-05-16 LAB
ALT SERPL-CCNC: 29 U/L (ref 10–40)
ANION GAP SERPL CALCULATED.3IONS-SCNC: 13 MMOL/L (ref 3–16)
BUN BLDV-MCNC: 12 MG/DL (ref 7–20)
CALCIUM SERPL-MCNC: 9.7 MG/DL (ref 8.3–10.6)
CHLORIDE BLD-SCNC: 107 MMOL/L (ref 99–110)
CHOLESTEROL, TOTAL: 211 MG/DL (ref 0–199)
CO2: 25 MMOL/L (ref 21–32)
CREAT SERPL-MCNC: 0.9 MG/DL (ref 0.6–1.2)
GFR AFRICAN AMERICAN: >60
GFR NON-AFRICAN AMERICAN: >60
GLUCOSE BLD-MCNC: 97 MG/DL (ref 70–99)
HDLC SERPL-MCNC: 55 MG/DL (ref 40–60)
LDL CHOLESTEROL CALCULATED: 129 MG/DL
POTASSIUM SERPL-SCNC: 4.3 MMOL/L (ref 3.5–5.1)
SODIUM BLD-SCNC: 145 MMOL/L (ref 136–145)
TRIGL SERPL-MCNC: 134 MG/DL (ref 0–150)
VLDLC SERPL CALC-MCNC: 27 MG/DL

## 2019-05-16 PROCEDURE — 1036F TOBACCO NON-USER: CPT | Performed by: FAMILY MEDICINE

## 2019-05-16 PROCEDURE — 36415 COLL VENOUS BLD VENIPUNCTURE: CPT | Performed by: FAMILY MEDICINE

## 2019-05-16 PROCEDURE — G8417 CALC BMI ABV UP PARAM F/U: HCPCS | Performed by: FAMILY MEDICINE

## 2019-05-16 PROCEDURE — G8427 DOCREV CUR MEDS BY ELIG CLIN: HCPCS | Performed by: FAMILY MEDICINE

## 2019-05-16 PROCEDURE — 99214 OFFICE O/P EST MOD 30 MIN: CPT | Performed by: FAMILY MEDICINE

## 2019-05-16 PROCEDURE — 3017F COLORECTAL CA SCREEN DOC REV: CPT | Performed by: FAMILY MEDICINE

## 2019-05-16 RX ORDER — TERAZOSIN 5 MG/1
5 CAPSULE ORAL NIGHTLY
Qty: 30 CAPSULE | Refills: 5 | Status: SHIPPED | OUTPATIENT
Start: 2019-05-16 | End: 2019-11-19

## 2019-05-16 ASSESSMENT — PATIENT HEALTH QUESTIONNAIRE - PHQ9
SUM OF ALL RESPONSES TO PHQ QUESTIONS 1-9: 0
SUM OF ALL RESPONSES TO PHQ QUESTIONS 1-9: 0
SUM OF ALL RESPONSES TO PHQ9 QUESTIONS 1 & 2: 0
1. LITTLE INTEREST OR PLEASURE IN DOING THINGS: 0
2. FEELING DOWN, DEPRESSED OR HOPELESS: 0

## 2019-05-16 NOTE — PROGRESS NOTES
Subjective:  Madeline Arthur is here to discuss the following issues. She has hypertension and at home her systolic pressures average about 145. No chest pain chest pressure or edema. She has elevated cholesterol and is fasting for blood work. She has a history of hyperglycemia with no polydipsia or polyuria. She has a history of atrial tachycardia and SA no dysfunction has a pacemaker. This is checked frequently and is functioning properly with no related symptoms. She has obstructive sleep apnea and continues on CPAP and is very compliant and feels much better rested  Social History     Tobacco Use   Smoking Status Former Smoker    Packs/day: 3.00    Years: 10.00    Pack years: 30.00    Types: Cigarettes    Last attempt to quit: 3/2/1986    Years since quittin.2   Smokeless Tobacco Never Used   Allergies:     Chlorthalidone    Objective:  BP (!) 149/90   Pulse 60   Temp 97.8 °F (36.6 °C) (Oral)   Wt 257 lb (116.6 kg)   SpO2 98%   BMI 48.56 kg/m²    No acute distress, heart regular rate and rhythm without murmur, lungs clear to auscultation easy effort, abdomen soft nondistended, no clubbing or cyanosis    Assessment:  1. Essential hypertension    2. Hypercholesteremia    3. Hyperglycemia    4. Paroxysmal atrial tachycardia ([address]    5. Sinus node dysfunction (HCC)    6. Pacemaker    7. ANNA on CPAP            Plan:  Increase Hytrin  Monitor blood pressures and advise me if not well controlled  Continue other medicines  Labs ordered  Offered DEXA scan and she will consider this  Follow-up in 6 months fasting  \"Healthy Family Handout\" provided  Avoid exposure to all tobacco products.   Read and consider all information provided by the pharmacy regarding prescribed medications before use  Careful medical compliance  Proper diet and weight management   Otherwise continue current treatment plan  Call or return if symptoms are not well controlled  Go to ED if severe/significant symptoms occur    All medical conditions for this patient are stable unless otherwise indicated    Johana Carrera MD    This note was transcribed using a voice recognition software system. Proper technique and careful oversight were used to increase transcription accuracy but inadvertent errors may be present.

## 2019-05-17 LAB
ESTIMATED AVERAGE GLUCOSE: 111.2 MG/DL
HBA1C MFR BLD: 5.5 %

## 2019-05-21 RX ORDER — TERAZOSIN 2 MG/1
CAPSULE ORAL
Qty: 30 CAPSULE | Refills: 0 | OUTPATIENT
Start: 2019-05-21

## 2019-05-22 RX ORDER — TERAZOSIN 2 MG/1
CAPSULE ORAL
Qty: 30 CAPSULE | Refills: 0 | OUTPATIENT
Start: 2019-05-22

## 2019-05-28 ENCOUNTER — NURSE ONLY (OUTPATIENT)
Dept: CARDIOLOGY CLINIC | Age: 64
End: 2019-05-28
Payer: COMMERCIAL

## 2019-05-28 DIAGNOSIS — I49.5 SINUS NODE DYSFUNCTION (HCC): ICD-10-CM

## 2019-05-28 DIAGNOSIS — Z95.0 PACEMAKER: ICD-10-CM

## 2019-05-28 PROCEDURE — 93296 REM INTERROG EVL PM/IDS: CPT | Performed by: INTERNAL MEDICINE

## 2019-05-28 PROCEDURE — 93294 REM INTERROG EVL PM/LDLS PM: CPT | Performed by: INTERNAL MEDICINE

## 2019-05-28 NOTE — LETTER
[address] [facility] [phone]  [address] [facility],[address] Floor [phone]    Pacemaker/Defibrillator Clinic          05/28/19        [address] [place] [address]        Dear Mic [place]    This letter is to inform you that we received the transmission from your monitor at home that checks your pacemaker and/or defibrillator, or implanted heart monitor. The next date you should transmit will be 8/27/19. Please be aware that the remote device transmission sites are periodically monitored only during regular business hours during which simultaneous in-office device clinics are being run. If your transmission requires attention, we will contact you as soon as possible. Thank you.             Alisa 81

## 2019-07-18 RX ORDER — MELOXICAM 15 MG/1
TABLET ORAL
Qty: 30 TABLET | Refills: 3 | Status: SHIPPED | OUTPATIENT
Start: 2019-07-18 | End: 2019-12-16 | Stop reason: SDUPTHER

## 2019-07-18 RX ORDER — PRAVASTATIN SODIUM 40 MG
TABLET ORAL
Qty: 30 TABLET | Refills: 3 | Status: SHIPPED | OUTPATIENT
Start: 2019-07-18 | End: 2019-12-16 | Stop reason: SDUPTHER

## 2019-09-10 ENCOUNTER — NURSE ONLY (OUTPATIENT)
Dept: CARDIOLOGY CLINIC | Age: 64
End: 2019-09-10
Payer: COMMERCIAL

## 2019-09-10 DIAGNOSIS — Z95.0 PACEMAKER: ICD-10-CM

## 2019-09-10 DIAGNOSIS — I49.5 SINUS NODE DYSFUNCTION (HCC): ICD-10-CM

## 2019-09-10 PROCEDURE — 93294 REM INTERROG EVL PM/LDLS PM: CPT | Performed by: INTERNAL MEDICINE

## 2019-09-10 PROCEDURE — 93296 REM INTERROG EVL PM/IDS: CPT | Performed by: INTERNAL MEDICINE

## 2019-09-10 NOTE — LETTER
[address] [facility] [phone] [address] [facility],[address] [phone]    Pacemaker/Defibrillator Clinic          09/11/19        [address] [address]        Dear Micheline Vaughan    This letter is to inform you that we received the transmission from your monitor at home that checks your pacemaker and/or defibrillator, or implanted heart monitor. The next date you should transmit will be 12/10/19. Please be aware that the remote device transmission sites are periodically monitored only during regular business hours during which simultaneous in-office device clinics are being run. If your transmission requires attention, we will contact you as soon as possible. Thank you.             [place] [address]

## 2019-09-16 ENCOUNTER — OFFICE VISIT (OUTPATIENT)
Dept: CARDIOLOGY CLINIC | Age: 64
End: 2019-09-16
Payer: COMMERCIAL

## 2019-09-16 ENCOUNTER — NURSE ONLY (OUTPATIENT)
Dept: CARDIOLOGY CLINIC | Age: 64
End: 2019-09-16
Payer: COMMERCIAL

## 2019-09-16 VITALS
HEIGHT: 60 IN | OXYGEN SATURATION: 98 % | SYSTOLIC BLOOD PRESSURE: 162 MMHG | WEIGHT: 259.6 LBS | DIASTOLIC BLOOD PRESSURE: 64 MMHG | HEART RATE: 61 BPM | BODY MASS INDEX: 50.97 KG/M2

## 2019-09-16 DIAGNOSIS — I47.1 PAROXYSMAL ATRIAL TACHYCARDIA (HCC): ICD-10-CM

## 2019-09-16 DIAGNOSIS — I10 ESSENTIAL HYPERTENSION: Primary | ICD-10-CM

## 2019-09-16 DIAGNOSIS — I49.5 SINUS NODE DYSFUNCTION (HCC): ICD-10-CM

## 2019-09-16 DIAGNOSIS — Z95.0 PACEMAKER: ICD-10-CM

## 2019-09-16 PROCEDURE — G8427 DOCREV CUR MEDS BY ELIG CLIN: HCPCS | Performed by: NURSE PRACTITIONER

## 2019-09-16 PROCEDURE — 99214 OFFICE O/P EST MOD 30 MIN: CPT | Performed by: NURSE PRACTITIONER

## 2019-09-16 PROCEDURE — 3017F COLORECTAL CA SCREEN DOC REV: CPT | Performed by: NURSE PRACTITIONER

## 2019-09-16 PROCEDURE — 93280 PM DEVICE PROGR EVAL DUAL: CPT | Performed by: INTERNAL MEDICINE

## 2019-09-16 PROCEDURE — G8417 CALC BMI ABV UP PARAM F/U: HCPCS | Performed by: NURSE PRACTITIONER

## 2019-09-16 PROCEDURE — 1036F TOBACCO NON-USER: CPT | Performed by: NURSE PRACTITIONER

## 2019-09-16 RX ORDER — ATENOLOL 50 MG/1
TABLET ORAL
Qty: 60 TABLET | Refills: 11 | Status: SHIPPED | OUTPATIENT
Start: 2019-09-16 | End: 2020-06-25 | Stop reason: SDUPTHER

## 2019-09-16 ASSESSMENT — ENCOUNTER SYMPTOMS
RESPIRATORY NEGATIVE: 1
GASTROINTESTINAL NEGATIVE: 1

## 2019-09-16 NOTE — PATIENT INSTRUCTIONS
Everything looks great today, good job!   Continue to check BP at home and call PCP if consistently >140/90  Continue to stay active  Follow up with [facility] in 1 year

## 2019-09-16 NOTE — PROGRESS NOTES
Patient comes in for programming evaluation for her pacemaker. All sensing and pacing parameters are within normal range. No changes need to be made at this time. No events recorded. Please see interrogation for more detail. Patient will follow up in 3 months in office or remotely.

## 2019-09-16 NOTE — LETTER
PT/INR: No results for input(s): PROTIME, INR in the last 72 hours. APTT:No results for input(s): APTT in the last 72 hours. FASTING LIPID PANEL:  Lab Results   Component Value Date    HDL 55 05/16/2019    HDL 54 01/05/2012    LDLCALC 129 05/16/2019    TRIG 134 05/16/2019     LIVER PROFILE:No results for input(s): AST, ALT, ALB in the last 72 hours. BNP: No results found for: PROBNP    Reviewed all labs and imaging today    Assessment:   Sinus node dysfunction: stable   - s/p dual chamber pacemaker on 11/10/2017  PAT: stable, no episodes on device checks since starting atenolol  Syncope: resolved, due to sinus node dysfunction  HTN: elevated today but controlled at home, some component of white coat    - managed by PCP  ANNA    Plan:   1. Continue to check BP at home, call PCP if consistently >140/90  2. Could consider adding amlodipine if remains elevated  3. Continue to stay active  4.  Follow up with Ryland Bunch CNP in 1 year    Hector Cruz, [address]  [phone]

## 2019-10-18 RX ORDER — LISINOPRIL 40 MG/1
TABLET ORAL
Qty: 30 TABLET | Refills: 5 | Status: SHIPPED | OUTPATIENT
Start: 2019-10-18 | End: 2020-05-15

## 2019-11-18 ENCOUNTER — OFFICE VISIT (OUTPATIENT)
Dept: FAMILY MEDICINE CLINIC | Age: 64
End: 2019-11-18
Payer: COMMERCIAL

## 2019-11-18 VITALS
SYSTOLIC BLOOD PRESSURE: 138 MMHG | OXYGEN SATURATION: 98 % | DIASTOLIC BLOOD PRESSURE: 70 MMHG | HEART RATE: 60 BPM | TEMPERATURE: 98.7 F | WEIGHT: 261 LBS | BODY MASS INDEX: 50.97 KG/M2

## 2019-11-18 DIAGNOSIS — Z95.0 PACEMAKER: ICD-10-CM

## 2019-11-18 DIAGNOSIS — G47.33 OSA ON CPAP: ICD-10-CM

## 2019-11-18 DIAGNOSIS — E78.00 HYPERCHOLESTEREMIA: Primary | ICD-10-CM

## 2019-11-18 DIAGNOSIS — I10 ESSENTIAL HYPERTENSION: ICD-10-CM

## 2019-11-18 DIAGNOSIS — Z99.89 OSA ON CPAP: ICD-10-CM

## 2019-11-18 DIAGNOSIS — R73.9 HYPERGLYCEMIA: ICD-10-CM

## 2019-11-18 LAB
ALT SERPL-CCNC: 28 U/L (ref 10–40)
ANION GAP SERPL CALCULATED.3IONS-SCNC: 13 MMOL/L (ref 3–16)
BUN BLDV-MCNC: 12 MG/DL (ref 7–20)
CALCIUM SERPL-MCNC: 9.5 MG/DL (ref 8.3–10.6)
CHLORIDE BLD-SCNC: 104 MMOL/L (ref 99–110)
CHOLESTEROL, TOTAL: 203 MG/DL (ref 0–199)
CO2: 23 MMOL/L (ref 21–32)
CREAT SERPL-MCNC: 1 MG/DL (ref 0.6–1.2)
GFR AFRICAN AMERICAN: >60
GFR NON-AFRICAN AMERICAN: 56
GLUCOSE BLD-MCNC: 88 MG/DL (ref 70–99)
HDLC SERPL-MCNC: 64 MG/DL (ref 40–60)
LDL CHOLESTEROL CALCULATED: 111 MG/DL
POTASSIUM SERPL-SCNC: 4.5 MMOL/L (ref 3.5–5.1)
SODIUM BLD-SCNC: 140 MMOL/L (ref 136–145)
TRIGL SERPL-MCNC: 139 MG/DL (ref 0–150)
VLDLC SERPL CALC-MCNC: 28 MG/DL

## 2019-11-18 PROCEDURE — 90471 IMMUNIZATION ADMIN: CPT | Performed by: FAMILY MEDICINE

## 2019-11-18 PROCEDURE — G8482 FLU IMMUNIZE ORDER/ADMIN: HCPCS | Performed by: FAMILY MEDICINE

## 2019-11-18 PROCEDURE — G8427 DOCREV CUR MEDS BY ELIG CLIN: HCPCS | Performed by: FAMILY MEDICINE

## 2019-11-18 PROCEDURE — 1036F TOBACCO NON-USER: CPT | Performed by: FAMILY MEDICINE

## 2019-11-18 PROCEDURE — 3017F COLORECTAL CA SCREEN DOC REV: CPT | Performed by: FAMILY MEDICINE

## 2019-11-18 PROCEDURE — G8417 CALC BMI ABV UP PARAM F/U: HCPCS | Performed by: FAMILY MEDICINE

## 2019-11-18 PROCEDURE — 99214 OFFICE O/P EST MOD 30 MIN: CPT | Performed by: FAMILY MEDICINE

## 2019-11-18 PROCEDURE — 36415 COLL VENOUS BLD VENIPUNCTURE: CPT | Performed by: FAMILY MEDICINE

## 2019-11-18 PROCEDURE — 90688 IIV4 VACCINE SPLT 0.5 ML IM: CPT | Performed by: FAMILY MEDICINE

## 2019-11-19 LAB
ESTIMATED AVERAGE GLUCOSE: 102.5 MG/DL
HBA1C MFR BLD: 5.2 %

## 2019-11-19 RX ORDER — TERAZOSIN 5 MG/1
CAPSULE ORAL
Qty: 90 CAPSULE | Refills: 1 | Status: SHIPPED | OUTPATIENT
Start: 2019-11-19 | End: 2020-06-25 | Stop reason: SDUPTHER

## 2019-12-10 ENCOUNTER — NURSE ONLY (OUTPATIENT)
Dept: CARDIOLOGY CLINIC | Age: 64
End: 2019-12-10
Payer: COMMERCIAL

## 2019-12-10 DIAGNOSIS — I49.5 SINUS NODE DYSFUNCTION (HCC): ICD-10-CM

## 2019-12-10 DIAGNOSIS — Z95.0 PACEMAKER: ICD-10-CM

## 2019-12-10 PROCEDURE — 93294 REM INTERROG EVL PM/LDLS PM: CPT | Performed by: INTERNAL MEDICINE

## 2019-12-10 PROCEDURE — 93296 REM INTERROG EVL PM/IDS: CPT | Performed by: INTERNAL MEDICINE

## 2019-12-17 RX ORDER — MELOXICAM 15 MG/1
TABLET ORAL
Qty: 30 TABLET | Refills: 5 | Status: SHIPPED | OUTPATIENT
Start: 2019-12-17 | End: 2020-06-25 | Stop reason: SDUPTHER

## 2019-12-17 RX ORDER — PRAVASTATIN SODIUM 40 MG
TABLET ORAL
Qty: 30 TABLET | Refills: 5 | Status: SHIPPED | OUTPATIENT
Start: 2019-12-17 | End: 2020-06-25 | Stop reason: SDUPTHER

## 2020-03-10 ENCOUNTER — NURSE ONLY (OUTPATIENT)
Dept: CARDIOLOGY CLINIC | Age: 65
End: 2020-03-10
Payer: COMMERCIAL

## 2020-03-10 PROCEDURE — 93296 REM INTERROG EVL PM/IDS: CPT | Performed by: INTERNAL MEDICINE

## 2020-03-10 PROCEDURE — 93294 REM INTERROG EVL PM/LDLS PM: CPT | Performed by: INTERNAL MEDICINE

## 2020-03-19 ENCOUNTER — TELEPHONE (OUTPATIENT)
Dept: PULMONOLOGY | Age: 65
End: 2020-03-19

## 2020-05-15 RX ORDER — LISINOPRIL 40 MG/1
TABLET ORAL
Qty: 30 TABLET | Refills: 5 | Status: SHIPPED | OUTPATIENT
Start: 2020-05-15 | End: 2020-12-14

## 2020-06-03 ENCOUNTER — TELEPHONE (OUTPATIENT)
Dept: PULMONOLOGY | Age: 65
End: 2020-06-03

## 2020-06-09 ENCOUNTER — NURSE ONLY (OUTPATIENT)
Dept: CARDIOLOGY CLINIC | Age: 65
End: 2020-06-09
Payer: COMMERCIAL

## 2020-06-09 PROCEDURE — 93296 REM INTERROG EVL PM/IDS: CPT | Performed by: INTERNAL MEDICINE

## 2020-06-09 PROCEDURE — 93294 REM INTERROG EVL PM/LDLS PM: CPT | Performed by: INTERNAL MEDICINE

## 2020-06-09 NOTE — LETTER
[address] [facility] [phone] [address] [facility],[address] [phone]    Pacemaker/Defibrillator Clinic          06/10/20        [address] [place] [address]        Dear Miguelito Flannery    This letter is to inform you that we received the transmission from your monitor at home that checks your implanted heart device. The next date you should transmit will be 9/8. If your report requires attention, we will notify you. Please be aware that the remote device transmission sites are periodically monitored only during regular business hours during which simultaneous in-office device clinics are being run. If your transmission requires attention, we will contact you as soon as possible. Thank you.             Alisa 81

## 2020-06-25 ENCOUNTER — OFFICE VISIT (OUTPATIENT)
Dept: FAMILY MEDICINE CLINIC | Age: 65
End: 2020-06-25
Payer: COMMERCIAL

## 2020-06-25 VITALS
SYSTOLIC BLOOD PRESSURE: 153 MMHG | TEMPERATURE: 98.4 F | WEIGHT: 260.8 LBS | HEART RATE: 65 BPM | DIASTOLIC BLOOD PRESSURE: 66 MMHG | BODY MASS INDEX: 50.93 KG/M2

## 2020-06-25 PROCEDURE — G8427 DOCREV CUR MEDS BY ELIG CLIN: HCPCS | Performed by: FAMILY MEDICINE

## 2020-06-25 PROCEDURE — 99214 OFFICE O/P EST MOD 30 MIN: CPT | Performed by: FAMILY MEDICINE

## 2020-06-25 PROCEDURE — G8417 CALC BMI ABV UP PARAM F/U: HCPCS | Performed by: FAMILY MEDICINE

## 2020-06-25 PROCEDURE — 3017F COLORECTAL CA SCREEN DOC REV: CPT | Performed by: FAMILY MEDICINE

## 2020-06-25 PROCEDURE — 36415 COLL VENOUS BLD VENIPUNCTURE: CPT | Performed by: FAMILY MEDICINE

## 2020-06-25 PROCEDURE — 1036F TOBACCO NON-USER: CPT | Performed by: FAMILY MEDICINE

## 2020-06-25 RX ORDER — TERAZOSIN 5 MG/1
CAPSULE ORAL
Qty: 90 CAPSULE | Refills: 1 | Status: SHIPPED | OUTPATIENT
Start: 2020-06-25 | End: 2021-02-12

## 2020-06-25 RX ORDER — PRAVASTATIN SODIUM 40 MG
TABLET ORAL
Qty: 30 TABLET | Refills: 5 | Status: SHIPPED | OUTPATIENT
Start: 2020-06-25 | End: 2020-12-31 | Stop reason: CLARIF

## 2020-06-25 RX ORDER — MELOXICAM 15 MG/1
TABLET ORAL
Qty: 30 TABLET | Refills: 5 | Status: SHIPPED | OUTPATIENT
Start: 2020-06-25 | End: 2021-01-14

## 2020-06-25 RX ORDER — ATENOLOL 50 MG/1
TABLET ORAL
Qty: 60 TABLET | Refills: 11 | Status: SHIPPED | OUTPATIENT
Start: 2020-06-25 | End: 2021-07-02

## 2020-06-25 ASSESSMENT — PATIENT HEALTH QUESTIONNAIRE - PHQ9
SUM OF ALL RESPONSES TO PHQ9 QUESTIONS 1 & 2: 0
SUM OF ALL RESPONSES TO PHQ QUESTIONS 1-9: 0
SUM OF ALL RESPONSES TO PHQ QUESTIONS 1-9: 0
1. LITTLE INTEREST OR PLEASURE IN DOING THINGS: 0
2. FEELING DOWN, DEPRESSED OR HOPELESS: 0

## 2020-06-25 NOTE — PROGRESS NOTES
Subjective:  Domenicasandra Mitchell is here to discuss the following issues. She has a history of PAT but is had no recent palpitations tachycardia no chest pain or shortness of breath no wheezing or cough. She has arthritis continues on meloxicam with no GI upset and no joint redness or swelling. She has obstructive sleep apnea and wears her CPAP faithfully and this is been extremely beneficial.  She has essential hypertension at home her blood pressures are very well controlled. She has a history of hyperglycemia and elevated cholesterol. She is fasting for blood work. No medicine side effects  Social History     Tobacco Use   Smoking Status Former Smoker    Packs/day: 3.00    Years: 10.00    Pack years: 30.00    Types: Cigarettes    Last attempt to quit: 3/2/1986    Years since quittin.3   Smokeless Tobacco Never Used   Allergies:     Chlorthalidone    Objective:  BP (!) 153/66   Pulse 65   Temp 98.4 °F (36.9 °C) (Temporal)   Wt 260 lb 12.8 oz (118.3 kg)   BMI 50.93 kg/m²    No acute distress, heart regular rate and rhythm without murmur, lungs clear to auscultation easy effort, abdomen soft nondistended, no clubbing or cyanosis    Assessment:  1. Paroxysmal atrial tachycardia ([address]    2. Arthritis    3. ANNA on CPAP    4. Essential hypertension    5. Hyperglycemia    6. 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   Read and consider all information provided by the pharmacy regarding prescribed medications before use  Careful medical compliance  Proper diet and weight management   Otherwise continue current treatment plan  Call or return if symptoms are not well controlled  Go to ED if severe/significant symptoms occur    All medical conditions for this patient are stable unless

## 2020-06-26 ENCOUNTER — TELEPHONE (OUTPATIENT)
Dept: PULMONOLOGY | Age: 65
End: 2020-06-26

## 2020-06-26 LAB
ALT SERPL-CCNC: 23 U/L (ref 10–40)
ANION GAP SERPL CALCULATED.3IONS-SCNC: 13 MMOL/L (ref 3–16)
BUN BLDV-MCNC: 12 MG/DL (ref 7–20)
CALCIUM SERPL-MCNC: 9.1 MG/DL (ref 8.3–10.6)
CHLORIDE BLD-SCNC: 106 MMOL/L (ref 99–110)
CHOLESTEROL, TOTAL: 191 MG/DL (ref 0–199)
CO2: 24 MMOL/L (ref 21–32)
CREAT SERPL-MCNC: 0.8 MG/DL (ref 0.6–1.2)
ESTIMATED AVERAGE GLUCOSE: 102.5 MG/DL
GFR AFRICAN AMERICAN: >60
GFR NON-AFRICAN AMERICAN: >60
GLUCOSE BLD-MCNC: 105 MG/DL (ref 70–99)
HBA1C MFR BLD: 5.2 %
HDLC SERPL-MCNC: 58 MG/DL (ref 40–60)
LDL CHOLESTEROL CALCULATED: 108 MG/DL
POTASSIUM SERPL-SCNC: 4.1 MMOL/L (ref 3.5–5.1)
SODIUM BLD-SCNC: 143 MMOL/L (ref 136–145)
TRIGL SERPL-MCNC: 123 MG/DL (ref 0–150)
VLDLC SERPL CALC-MCNC: 25 MG/DL

## 2020-06-26 NOTE — TELEPHONE ENCOUNTER
Within this Telehealth Consent, the terms you and yours refer to the person using the Telehealth Service (Service), or in the case of a use of the Service by or on behalf of a minor, you and yours refer to and include (i) the parent or legal guardian who provides consent to the use of the Service by such minor or uses the Service on behalf of such minor, and (ii) the minor for whom consent is being provided or on whose behalf the Service is being utilized. When using Service, you will be consulting with your health care providers via the use of Telehealth.   Telehealth involves the delivery of healthcare services using electronic communications, information technology or other means between a healthcare provider and a patient who are not in the same physical location. Telehealth may be used for diagnosis, treatment, follow-up and/or patient education, and may include, but is not limited to, one or more of the following:    Electronic transmission of medical records, photo images, personal health information or other data between a patient and a healthcare provider    Interactions between a patient and healthcare provider via audio, video and/or data communications    Use of output data from medical devices, sound and video files    Anticipated Benefits   The use of Telehealth by your Provider(s) through the Service may have the following possible benefits:    Making it easier and more efficient for you to access medical care and treatment for the conditions treated by such Provider(s) utilizing the Service    Allowing you to obtain medical care and treatment by Provider(s) at times that are convenient for you    Enabling you to interact with Provider(s) without the necessity of an in-office appointment     Possible Risks   While the use of Telehealth can provide potential benefits for you, there are also potential risks associated with the use of Telehealth.  These risks include, but may not be limited to the following:    Your Provider(s) may not able to provide medical treatment for your particular condition and you may be required to seek alternative healthcare or emergency care services.  The electronic systems or other security protocols or safeguards used in the Service could fail, causing a breach of privacy of your medical or other information.  Given regulatory requirements in certain jurisdictions, your Provider(s) diagnosis and/or treatment options, especially pertaining to certain prescriptions, may be limited. Acceptance   1. You understand that Services will be provided via Telehealth. This process involves the use of HIPAA compliant and secure, real-time audio-visual interfacing with a qualified and appropriately trained provider located at [facility]. 2. You understand that, under no circumstances, will this session be recorded. 3. You understand that the Provider(s) at [facility] and other clinical participants will be party to the information obtained during the Telehealth session in accordance with best medical practices. 4. You understand that the information obtained during the Telehealth session will be used to help determine the most appropriate treatment options. 5. You understand that You have the right to revoke this consent at any point in time. 6. You understand that Telehealth is voluntary, and that continued treatment is not dependent upon consent. 7. You understand that, in the event of non-consent to Telehealth services and/or technical difficulties, you will obtain services as typically provided in the absence of Telehealth technology. 8. You understand that this consent will be kept in Your medical record. 9. No potential benefits from the use of Telehealth or specific results can be guaranteed. Your condition may not be cured or improved and, in some cases, may get worse.    10. There are limitations in the provision of medical care and treatment via Telehealth and the Service and you may not be able to receive diagnosis and/or treatment through the Service for every condition for which you seek diagnosis and/or treatment. 11. There are potential risks to the use of Telehealth, including but not limited to the risks described in this Telehealth Consent. 12. Your Provider(s) have discussed the use of Telehealth and the Service with you, including the benefits and risks of such and you have provided oral consent to your Provider(s) for the use of Telehealth and the Service. 15. You understand that it is your duty to provide your Provider(s) truthful, accurate and complete information, including all relevant information regarding care that you may have received or may be receiving from other healthcare providers outside of the Service. 14. You understand that each of your Provider(s) may determine in his or sole discretion that your condition is not suitable for diagnosis and/or treatment using the Service, and that you may need to seek medical care and treatment a specialist or other healthcare provider, outside of the Service. 15. You understand that you are fully responsible for payment for all services provided by Provider(s) or through use of the Service and that you may not be able to use third-party insurance. 16. You represent that (a) you have read this Telehealth Consent carefully, (b) you understand the risks and benefits of the Service and the use of Telehealth in the medical care and treatment provided to you by Provider(s) using the Service, and (c) you have the legal capacity and authority to provide this consent for yourself and/or the minor for which you are consenting under applicable federal and state laws, including laws relating to the age of [de-identified] and/or parental/guardian consent.    17. You give your informed consent to the use of Telehealth by Provider(s) using the Service under the terms described in the Terms of Service and this Telehealth Consent. The patient was read the following statement and has consented to the visit as of 6/26/20. The patient has been scheduled for their first telehealth visit on 7/21/20 with Dr. Theodore Harper.

## 2020-07-15 RX ORDER — MELOXICAM 15 MG/1
TABLET ORAL
Qty: 30 TABLET | Refills: 6 | OUTPATIENT
Start: 2020-07-15

## 2020-07-15 RX ORDER — PRAVASTATIN SODIUM 40 MG
TABLET ORAL
Qty: 30 TABLET | Refills: 6 | OUTPATIENT
Start: 2020-07-15

## 2020-07-15 NOTE — TELEPHONE ENCOUNTER
Future appt scheduled 12/28/2020       Last appt 06/25/2020      Last Written       meloxicam (MOBIC) 15 MG tablet  06/25/2020  #30  5 RF       pravastatin (PRAVACHOL) 40 MG tablet  06/25/2020  #30  5 RF     WRITTEN LESS THAN 3 WEEKS AGO AND HAS 5 REFILLS

## 2020-07-22 ENCOUNTER — VIRTUAL VISIT (OUTPATIENT)
Dept: PULMONOLOGY | Age: 65
End: 2020-07-22
Payer: COMMERCIAL

## 2020-07-22 PROCEDURE — G8417 CALC BMI ABV UP PARAM F/U: HCPCS | Performed by: INTERNAL MEDICINE

## 2020-07-22 PROCEDURE — 3017F COLORECTAL CA SCREEN DOC REV: CPT | Performed by: INTERNAL MEDICINE

## 2020-07-22 PROCEDURE — 1036F TOBACCO NON-USER: CPT | Performed by: INTERNAL MEDICINE

## 2020-07-22 PROCEDURE — G8427 DOCREV CUR MEDS BY ELIG CLIN: HCPCS | Performed by: INTERNAL MEDICINE

## 2020-07-22 PROCEDURE — 99213 OFFICE O/P EST LOW 20 MIN: CPT | Performed by: INTERNAL MEDICINE

## 2020-07-22 ASSESSMENT — SLEEP AND FATIGUE QUESTIONNAIRES
HOW LIKELY ARE YOU TO NOD OFF OR FALL ASLEEP WHILE WATCHING TV: 1
HOW LIKELY ARE YOU TO NOD OFF OR FALL ASLEEP WHILE SITTING QUIETLY AFTER LUNCH WITHOUT ALCOHOL: 1
HOW LIKELY ARE YOU TO NOD OFF OR FALL ASLEEP WHILE LYING DOWN TO REST IN THE AFTERNOON WHEN CIRCUMSTANCES PERMIT: 2
HOW LIKELY ARE YOU TO NOD OFF OR FALL ASLEEP WHILE SITTING AND READING: 1
ESS TOTAL SCORE: 6
HOW LIKELY ARE YOU TO NOD OFF OR FALL ASLEEP WHILE SITTING INACTIVE IN A PUBLIC PLACE: 0
HOW LIKELY ARE YOU TO NOD OFF OR FALL ASLEEP WHEN YOU ARE A PASSENGER IN A CAR FOR AN HOUR WITHOUT A BREAK: 1
HOW LIKELY ARE YOU TO NOD OFF OR FALL ASLEEP WHILE SITTING AND TALKING TO SOMEONE: 0
HOW LIKELY ARE YOU TO NOD OFF OR FALL ASLEEP IN A CAR, WHILE STOPPED FOR A FEW MINUTES IN TRAFFIC: 0

## 2020-07-22 NOTE — PATIENT INSTRUCTIONS
Remember to bring all pulmonary medications to your next appointment with the office. Please keep all of your future appointments scheduled by [address], [facility]monary office. Out of respect for other patients and providers, you may be asked to reschedule your appointment if you arrive later than your scheduled appointment time. Appointments cancelled less than 24hrs in advance will be considered a no show. Patients with three missed appointments within 1 year or four missed appointments within 2 years can be dismissed from the practice. You may receive a survey regarding the care you received during your visit. Your input is valuable to us. We encourage you to complete and return your survey. We hope you will choose us in the future for your healthcare needs.

## 2020-07-22 NOTE — PROGRESS NOTES
HISTORY OF PRESENT ILLNESS: Rex Lechuga is a 63y.o. year old female with a history of SVT and syncope who presents who presents for annual follow-up for ANNA. This visit was conducted as a virtual visit through [employer]. Her PCP is Dr. Aaron Rendon, cardiologists Jacob Leija and then [facility], APRN-CNP. Mana Duffy was last seen on 3/20/19. She has morbid obesity, hypertension, hyperlipidemia, hyperglycemia, PAT status post dual-chamber pacemaker. Her DME is [facility]. The patient has been doing very well with her CPAP, but feels that the pressure is somewhat high. She says she is woken up by the high pressure. Otherwise she is tolerating it well. She is cleaning the equipment regularly. She denies other issues. Her medications and allergies were reviewed. CPAP compliance data 6/21 through 7/20/2020  Usage 100% of her nights, average usage 8 hours and 8 minutes. She is on auto CPAP 5-20 cm H2O, 95th percentile pressure was 13.2 cm H2O, 95th percentile leak was 12.6 LPM.  AHI 0.5. Previous notes reviewed and edited as necessary. Mana Duffy presents for annual follow-up for ANNA. She is very happy with her CPAP, is not sleepy in the daytime. She says she cannot sleep without it. On rare days when she goes across the street to spend the night with her granddaughters, she may not use her CPAP. She volunteers in the school. She recently had a fall, but no other change in her medical history, medications. She has been seen by [facility] in follow-up. CPAP compliance data 2/18 through 3/19/19  Uses 29/30 days, 97%. Average usage 7 hours, 31 minutes. On auto CPAP 5-20 cm H2O.  95th percentile pressure was 15.9, maximum 17.6 cm H2O. Leak was low, 95th percentile leak 7.7 LPM.  AHI was 0.9. Previous notes reviewed and edited as necessary. The patient had a sleep study on 12/18/17 which showed moderate ANNA, AHI 18 per hour, no obstruction saturation 90%.   Her sleep efficiency was low at 30%.  She saw Dr. Latrell Escobar in follow-up on 12/21, was placed on AutoPap. She saw Dr. Peter Montelongo in follow-up on 2/23, no new recommendations were provided. The patient presents today to assess her CPAP tolerance and compliance. The patient says she struggled in the first few months to get used to the CPAP, was averaging only about 4 hours. She has gradually been tolerating this better. She has had a \"cold\" with stuffy nose and occasional rhinorrhea, and had an episode of \"laryngitis\" with complete loss of voice. She says she gets an episode every 7-8 years. This is followed by mild episode of bronchitis. She was prescribed Zyrtec, has not been using it. She also cleans her nares with a Neti pot. She often takes care of her grandchildren. She was given a treadmill as a gift and Jerson, will make attempts to lose weight. ANNA compliance data  Her EKG did not last 30 days was 100%, average 623 minutes. She is on AutoPap 5-20 cm water, 95th percentile was 14 cm water. Maximum leak was 18.1, AHI was 1.1. Previous notes reviewed and edited as necessary. The patient said she started with cardiac arrhythmia around 2004. She had a racing heartbeat, wore a Holter monitor and was seen in [facility] from one of the cardiologists at [address] [place] [address]. She was diagnosed with SVT and placed on atenolol. This helped her blood pressure as well. As she gained weight, she has had more difficulty with blood pressure control. Over the last 10 years, she has gained almost 50 pounds. On 10/15/17, she left her home to get the newspaper, came back and was sitting and reading her newspaper when she had a syncopal episode. She estimates this to last about 30 seconds, she had no injury. She then woke up with some difficulty to go to the bedroom and measure her blood pressure.   The cough kept giving in error signal repeatedly, and while she was sitting at the edge of her bed, she had another syncopal episode and got wedged between the bed and her dresser, sustaining an injury to her left cheekbone that has subsequently healed. She thinks she woke up in a few seconds. She called her daughter who is a nurse, and was driven to the ER at [place] [place]. EKG showed pauses and she was transferred to [facility], was seen by Dr. Chang Joseph, who referred her to Dr. Idalia Fuenets. She underwent an echocardiogram and routine blood work. She has had no further episodes of syncope, but was called by [facility] for a third episode on Saturday. She was called by Dr. Idalia Fuentes, told that she had a 6 second pause and that she needed a pacemaker. This is scheduled for Monday. She denies any chest pain or dyspnea. She has no cough. She does have dizziness and lightheadedness, blurred vision and vague nausea and abdominal pain around the episodes of syncope. The patient is a , her   from bladder cancer. She had been told by him that she had loud snoring, she has woken herself up snoring. She has no snorting or choking, no PND or orthopnea. Occasionally she has sweating which she thinks are due to hot flashes, she has occasional nocturnal reflux. She goes to bed around 11 PM, is playing games on her T[employer]uthouse, occasionally watching television when she sleeps in a recliner as she has a water bed and has been told not to use it. When her  was alive, she slept in a recliner. When she worked, she was having shifted to waking up at 4:30 AM.  Now she wakes up between 4:30 and 5:30 AM, gets out of bed because she is looking forward to babysitting her grandchildren. Over the weekends, she wakes up, but then goes back to sleep and gets out of bed later around 7 or 7:30 AM.  She thinks she is not refreshed, but she is happy to look after her grandchildren, looks forward to the day.   Once or twice a week, she falls asleep between 8:30 and 9 PM, then will wake up around 1:30 AM.  She tends to watch television between 12:30 and 2 in the afternoon, falls asleep watching TV once or twice a week. She is not sleepy during driving, but does feel tired in the daytime. She occasionally bites her tongue just at the time and she is about to fall asleep, then sleeps with her mouth guard. 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 Jody Madrid, rheumatologist). She has a brother and 3 sisters, her sister may be developing Parkinson's disease. Her father  of Parkinson's disease, she is not sure what her mother  of, possibly MI. Her mother had non-Hodgkin's lymphoma. She has 2 children, healthy. The patient was a heavy smoker, from age [age], smoked 2-3 packs per day. She does not drink alcohol. She was  in 2013. 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 The patient's past medical, surgical, family and personal history were reviewed by me personally, changes made in EMR as needed. Echocardiogram 10/16/17  Rhythm appears to be atrial fibrillation. Left ventricular systolic function is normal with the ejection fraction estimated at 55%. No regional wall motion abnormalities. There is mild concentric left ventricular hypertrophy. Left ventricular diastolic filling pressure is indeterminate secondary to arrhythmia. The right ventricle is mildly enlarged. Right atrial size is mildly dilated . No significant valvular heart disease. CTPA 10/17/17  1. No acute findings in the chest.  Specifically, no findings of pulmonary   embolism. 2. Mild cardiomegaly with mild right ventricular hypertrophy and mild   concentric left ventricular hypertrophy. 3. A few incidental findings above for which no follow-up is recommended.      Labs 10/16/17  CBC and renal profile normal. In  she has had a positive SOPHY with titers 1:160, RF wa    PAST MEDICAL HISTORY:  Past Medical History:   Diagnosis Date    HTN     SVT (supraventricular tachycardia) ([address]     Wrist fracture, closed, left, with routine healing, subsequent encounter     Compound       PAST SURGICAL HISTORY:  Past Surgical History:   Procedure Laterality Date    BREAST BIOPSY Right 2002    CYST REMOVAL Right        FAMILY HISTORY:  family history includes Hypertension in her father; Parkinsonism in her father. SOCIAL HISTORY:   reports that she quit smoking about 34 years ago. Her smoking use included cigarettes. She has a 30.00 pack-year smoking history. She has never used smokeless tobacco.      ALLERGIES:  Patient is allergic to chlorthalidone. REVIEW OF SYSTEMS:  Constitutional: Negative for fever   HENT: Negative for sore throat,   Eyes: Negative for redness   Respiratory: Negative for dyspnea, cough  Cardiovascular: Negative for chest pain  Gastrointestinal: Negative for vomiting, diarrhea   Genitourinary: Negative for hematuria   Musculoskeletal: Positive for arthralgias   Skin: Negative for rash  Neurological: Positive for syncope  Hematological: Negative for adenopathy  Psychiatric/Behavorial: Negative for anxiety    Objective:   PHYSICAL EXAM:  There were no vitals taken for this visit.'  Gen: Very pleasant, short, obese. No distress. Eyes: PERRL. No sclera icterus. No conjunctival injection. Glasses. ENT: No discharge. Pharynx clear. Class III airway, enlarged tonsils. External appearance of ears and nose normal. Mallampati  III  Neck: No JVD. Short and large neck  Resp: No accessory muscle use. No crackles. No wheezes. No rhonchi. CV: Regular rate. Regular rhythm. No murmur or rub. No edema. GI: Deferred. Skin: Warm, dry, normal texture and turgor. No nodule on exposed extremities. Lymph: Deferred. M/S: No cyanosis. No clubbing. No joint deformity. Neuro: Moves all four extremities. Psych: Oriented x 3. No anxiety. Awake. Alert. Intact judgement and insight.     Current Outpatient Medications   Medication Sig Dispense Refill    terazosin (HYTRIN) 5 MG capsule TAKE 1 CAPSULE NIGHTLY 90 capsule 1    pravastatin (PRAVACHOL) 40 MG tablet TAKE (1) TABLET DAILY 30 tablet 5    meloxicam (MOBIC) 15 MG tablet TAKE (1) TABLET DAILY 30 tablet 5    atenolol (TENORMIN) 50 MG tablet TAKE 2 TABLETS DAILY 60 tablet 11    lisinopril (PRINIVIL;ZESTRIL) 40 MG tablet TAKE (1) TABLET DAILY 30 tablet 5    augmented betamethasone dipropionate (DIPROLENE AF) 0.05 % cream APPLY TWICE A DAY TO THE AFFECTED AREA(S) 30 g 5    aspirin 81 MG EC tablet Take 81 mg by mouth daily. No current facility-administered medications for this visit. Data Reviewed:   CBC and Renal reviewed  Last CBC  Lab Results   Component Value Date    WBC 7.3 11/11/2017    RBC 5.23 11/11/2017    HGB 15.1 11/11/2017    MCV 87.7 11/11/2017     11/11/2017     Last Renal  Lab Results   Component Value Date     06/25/2020    K 4.1 06/25/2020     06/25/2020    CO2 24 06/25/2020    CO2 23 11/18/2019    CO2 25 05/16/2019    BUN 12 06/25/2020    CREATININE 0.8 06/25/2020    GLUCOSE 105 06/25/2020    CALCIUM 9.1 06/25/2020     Chest imaging reports were reviewed and imaging was reviewed by me    Assessment:     · ANNA, EDS  · Poor sleep hygiene  · Syncope  · Obesity  ·     Plan:      1. Obstructive sleep apnea, excessive daytime sleepiness  Doing extremely well on AutoPap 5-20 cm H2O, extremely good compliance and very good AHI. She is cleaning the humidifier, mask and hoses as recommended. She is well acclimated. She is noting the pressure to be high, I believe she will tolerate reduction in pressure to 5-15 cm H2O.    2.  Allergic rhinitis  Was unlikely to be related to her CPAP, although nasal stuffiness is not uncommon. She does not have bothersome symptoms at present    3. Poor sleep hygiene  Regularity of sleep and wake cycle was discussed with her at a previous visit. 4. Syncope, unspecified syncope type  Followed by cardiology    5.  Class 3 obesity due to excess calories without serious comorbidity with body mass index (BMI) of 45.0 to 49.9 in adult [facility])  Should make attempts to lose weight with caloric restriction and exercise. She will use her treadmill trying to lose weight. RTC one year, call if problems. Lalo Chairez is a 63 y.o. female being evaluated by a Virtual Visit (video visit) encounter to address concerns as mentioned above. A caregiver was present when appropriate. Due to this being a TeleHealth encounter (During [facility] public health emergency), evaluation of the following organ systems was limited: Vitals/Constitutional/EENT/Resp/CV/GI//MS/Neuro/Skin/Heme-Lymph-Imm. Pursuant to the emergency declaration under the [address] [place] [address] authority and the [employer] Act, this Virtual Visit was conducted with patient's (and/or legal guardian's) consent, to reduce the patient's risk of exposure to COVID-19 and provide necessary medical care. The patient (and/or legal guardian) has also been advised to contact this office for worsening conditions or problems, and seek emergency medical treatment and/or call 911 if deemed necessary. Patient identification was verified at the start of the visit: Yes    Total time spent for this encounter: 17 minutes    Services were provided through a video synchronous discussion virtually to substitute for in-person clinic visit. Patient and provider were located at their individual homes. --Earl Linda MD on 7/23/2020 at 11:07 PM    An electronic signature was used to authenticate this note.

## 2020-08-31 ENCOUNTER — TELEPHONE (OUTPATIENT)
Dept: PULMONOLOGY | Age: 65
End: 2020-08-31

## 2020-08-31 NOTE — TELEPHONE ENCOUNTER
The adjustment with CPAP is doing good. Less side effects less events. Sleep time is good. Will call for CR and scan to chart.

## 2020-09-11 NOTE — PROGRESS NOTES
We received remote transmission from patient's monitor at home. Transmission shows normal sensing and pacing function. EP physician will review. See interrogation under cardiology tab in the [address] [facility] [address] field for more details. Episodes Since: 16-Sep-2019  1 Non-sustained VT-egm shows pAt x 5 sec. (tenormin). Pt received new monitor. Ov 9/21  Follow up in 3 months via [employer].

## 2020-09-14 ENCOUNTER — NURSE ONLY (OUTPATIENT)
Dept: CARDIOLOGY CLINIC | Age: 65
End: 2020-09-14
Payer: COMMERCIAL

## 2020-09-14 PROCEDURE — 93296 REM INTERROG EVL PM/IDS: CPT | Performed by: INTERNAL MEDICINE

## 2020-09-14 PROCEDURE — 93294 REM INTERROG EVL PM/LDLS PM: CPT | Performed by: INTERNAL MEDICINE

## 2020-09-14 NOTE — LETTER
[facility] [phone] [address] [facility],[address] [phone]    Pacemaker/Defibrillator Clinic          09/14/20        [address] [place] [id number]        Dear Jose Juan Jurado    This letter is to inform you that we received the transmission from your monitor at home that checks your implanted heart device. The next date you should transmit will be 12/22. If your report requires attention, we will notify you. Please be aware that the remote device transmission sites are periodically monitored only during regular business hours during which simultaneous in-office device clinics are being run. If your transmission requires attention, we will contact you as soon as possible. Thank you.             [facility]

## 2020-09-21 ENCOUNTER — OFFICE VISIT (OUTPATIENT)
Dept: CARDIOLOGY CLINIC | Age: 65
End: 2020-09-21
Payer: COMMERCIAL

## 2020-09-21 VITALS
HEIGHT: 60 IN | DIASTOLIC BLOOD PRESSURE: 80 MMHG | SYSTOLIC BLOOD PRESSURE: 130 MMHG | HEART RATE: 60 BPM | OXYGEN SATURATION: 97 % | BODY MASS INDEX: 51.04 KG/M2 | WEIGHT: 260 LBS

## 2020-09-21 PROCEDURE — 99214 OFFICE O/P EST MOD 30 MIN: CPT | Performed by: NURSE PRACTITIONER

## 2020-09-21 PROCEDURE — 1036F TOBACCO NON-USER: CPT | Performed by: NURSE PRACTITIONER

## 2020-09-21 PROCEDURE — 3017F COLORECTAL CA SCREEN DOC REV: CPT | Performed by: NURSE PRACTITIONER

## 2020-09-21 PROCEDURE — G8427 DOCREV CUR MEDS BY ELIG CLIN: HCPCS | Performed by: NURSE PRACTITIONER

## 2020-09-21 PROCEDURE — G8417 CALC BMI ABV UP PARAM F/U: HCPCS | Performed by: NURSE PRACTITIONER

## 2020-09-21 NOTE — PROGRESS NOTES
[address]   Electrophysiology  Note              Date:  September 21, 2020  Patient name: Sobeida Medina  YOB: 1955    Primary Care physician: Vicente Serrato MD    HISTORY OF PRESENT ILLNESS: The patient is a 63 y.o.  female with a history of sinus node dysfunction, syncope, PAT, HTN, and ANNA. She was admitted in 10/2017 with syncope. Echo showed an EF of 55%. She was discharged with a 30 day event monitor and multiple pauses were detected. She had a 6.4 second pause with syncope and had a dual chamber pacemaker implanted on 11/10/2017. PAT was detected during her hospital stay and she was restarted on atenolol. Device checks have been normal to date. Today she is being seen for sinus node dysfunction. Device check on 9/11/2020 showed normal function, 66% AP, less than 1% , and one second of PAT. She is feeling well and denies chest pain, palpitations, shortness of breath, and dizziness. Reports home BP is averaging 134/76. Past Medical History:   has a past medical history of HTN, SVT (supraventricular tachycardia) ([facility] [address] and Wrist fracture, closed, left, with routine healing, subsequent encounter. Past Surgical History:   has a past surgical history that includes Breast biopsy (Right, 2002) and cyst removal (Right). Home Medications:    Prior to Admission medications    Medication Sig Start Date End Date Taking?  Authorizing Provider   terazosin (HYTRIN) 5 MG capsule TAKE 1 CAPSULE NIGHTLY 6/25/20  Yes Vicente Serrato MD   pravastatin (PRAVACHOL) 40 MG tablet TAKE (1) TABLET DAILY 6/25/20  Yes Vicente Serrato MD   meloxicam (MOBIC) 15 MG tablet TAKE (1) TABLET DAILY 6/25/20  Yes Vicente Serrato MD   atenolol (TENORMIN) 50 MG tablet TAKE 2 TABLETS DAILY 6/25/20  Yes Vicente Serrato MD   lisinopril (PRINIVIL;ZESTRIL) 40 MG tablet TAKE (1) TABLET DAILY 5/15/20  Yes Vicente Serrato MD   augmented betamethasone dipropionate (DIPROLENE AF) 0.05 % cream abnormalities of mood, affect, memory, mentation, or behavior are noted    DATA:    ECG 11/10/2017:  SR with PAT     Echo 10/16/2017:  Rhythm appears to be atrial fibrillations.   Left ventricular systolic function is normal with the ejection fraction estimated at 55%.   No regional wall motion abnormalities.   There is mild concentric left ventricular hypertrophy.   Left ventricular diastolic filling pressure is indeterminate secondary to arrhythmia.   The right ventricle is mildly enlarged.   Right atrial size is mildly dilated . No significant valvular heart disease. CARDIOLOGY LABS:   CBC: No results for input(s): WBC, HGB, HCT, PLT in the last 72 hours. BMP: No results for input(s): NA, K, CO2, BUN, CREATININE, LABGLOM, GLUCOSE in the last 72 hours. PT/INR: No results for input(s): PROTIME, INR in the last 72 hours. APTT:No results for input(s): APTT in the last 72 hours. FASTING LIPID PANEL:  Lab Results   Component Value Date    HDL 58 06/25/2020    HDL 54 01/05/2012    LDLCALC 108 06/25/2020    TRIG 123 06/25/2020     LIVER PROFILE:No results for input(s): AST, ALT, ALB in the last 72 hours. Assessment:   1. Sinus node dysfunction: stable   -s/p dual chamber pacemaker implant on 11/10/2017   -device check on 9/11/2020 showed normal function, 66% AP, less than 1% , and one second of PAT  2. Paroxysmal atrial tachycardia: stable   -noted in hospital, atenolol increased   -asymptomatic  3. HTN: controlled   4. ANNA: management per pulmonary     Plan:   1. Continue lisinopril and atenolol  2. Monitor BP at home and call if persistently elevated (patient has a history of hypokalemia with chlorthalidone, could trial spironolactone)  3. Annual BMP (due 6/2021)  4. Remote device transmissions every 3 months    5.  Follow up in one year    Scott [address]  [phone]

## 2020-09-21 NOTE — LETTER
[address]   Electrophysiology  Note              Date:  September 21, 2020  Patient name: Miri Ocampo  YOB: 1955    Primary Care physician: Shania Riley MD    HISTORY OF PRESENT ILLNESS: The patient is a 63 y.o.  female with a history of sinus node dysfunction, syncope, PAT, HTN, and ANNA. She was admitted in 10/2017 with syncope. Echo showed an EF of 55%. She was discharged with a 30 day event monitor and multiple pauses were detected. She had a 6.4 second pause with syncope and had a dual chamber pacemaker implanted on 11/10/2017. PAT was detected during her hospital stay and she was restarted on atenolol. Device checks have been normal to date. Today she is being seen for sinus node dysfunction. Device check on 9/11/2020 showed normal function, 66% AP, less than 1% , and one second of PAT. She is feeling well and denies chest pain, palpitations, shortness of breath, and dizziness. Reports home BP is averaging 134/76. Past Medical History:   has a past medical history of HTN, SVT (supraventricular tachycardia) ([address] and Wrist fracture, closed, left, with routine healing, subsequent encounter. Past Surgical History:   has a past surgical history that includes Breast biopsy (Right, 2002) and cyst removal (Right). Home Medications:    Prior to Admission medications    Medication Sig Start Date End Date Taking?  Authorizing Provider   terazosin (HYTRIN) 5 MG capsule TAKE 1 CAPSULE NIGHTLY 6/25/20  Yes Shania Riley MD   pravastatin (PRAVACHOL) 40 MG tablet TAKE (1) TABLET DAILY 6/25/20  Yes Shania Riley MD   meloxicam (MOBIC) 15 MG tablet TAKE (1) TABLET DAILY 6/25/20  Yes Shania Riley MD   atenolol (TENORMIN) 50 MG tablet TAKE 2 TABLETS DAILY 6/25/20  Yes Shania Riley MD   lisinopril (PRINIVIL;ZESTRIL) 40 MG tablet TAKE (1) TABLET DAILY 5/15/20  Yes Shania Riley MD augmented betamethasone dipropionate (DIPROLENE AF) 0.05 % cream APPLY TWICE A DAY TO THE AFFECTED AREA(S) 1/31/17  Yes Jefferson Rice MD   aspirin 81 MG EC tablet Take 81 mg by mouth daily. Yes Historical Provider, MD       Allergies:  Chlorthalidone    Social History:   reports that she quit smoking about 34 years ago. Her smoking use included cigarettes. She started smoking about 45 years ago. She has a 30.00 pack-year smoking history. She has never used smokeless tobacco. She reports current alcohol use. She reports that she does not use drugs. Family History: family history includes Hypertension in her father; Parkinsonism in her father. Review of Systems   Constitutional: Negative  HENT: Negative. Eyes: Negative. Respiratory: Negative. Cardiovascular: see HPI  Gastrointestinal: Negative. Genitourinary: Negative. Musculoskeletal: Negative. Skin: Negative. Neurological: Negative. Hematological: Negative. Psychiatric/Behavioral: Negative. PHYSICAL EXAM:    Vital signs:    /80   Pulse 60   Ht 5' (1.524 m)   Wt 260 lb (117.9 kg)   SpO2 97%   BMI 50.78 kg/m²      Constitutional and general appearance: alert, cooperative, no distress and appears stated age  [de-identified]: PERRL, no cervical lymphadenopathy. No masses palpable.  Normal oral mucosa  Respiratory:  · Normal excursion and expansion without use of accessory muscles  · Resp auscultation: Normal breath sounds without dullness or wheezing  Cardiovascular:  · The apical impulse is not displaced  · Heart tones are crisp and normal. Regular S1 and S2.  · Jugular venous pulsation Normal  · The carotid upstroke is normal in amplitude and contour without delay or bruit  · Peripheral pulses are symmetrical and full   Abdomen:  · No masses or tenderness  · Bowel sounds present  Extremities:  ·  No cyanosis or clubbing  ·  No lower extremity edema  ·  Skin: warm and dry; left upper chest incision is closed and healed Neurological:  · Alert and oriented  · Moves all extremities well  · No abnormalities of mood, affect, memory, mentation, or behavior are noted    DATA:    ECG 11/10/2017:  SR with PAT     Echo 10/16/2017:  Rhythm appears to be atrial fibrillations.   Left ventricular systolic function is normal with the ejection fraction estimated at 55%.   No regional wall motion abnormalities.   There is mild concentric left ventricular hypertrophy.   Left ventricular diastolic filling pressure is indeterminate secondary to arrhythmia.   The right ventricle is mildly enlarged.   Right atrial size is mildly dilated . No significant valvular heart disease. CARDIOLOGY LABS:   CBC: No results for input(s): WBC, HGB, HCT, PLT in the last 72 hours. BMP: No results for input(s): NA, K, CO2, BUN, CREATININE, LABGLOM, GLUCOSE in the last 72 hours. PT/INR: No results for input(s): PROTIME, INR in the last 72 hours. APTT:No results for input(s): APTT in the last 72 hours. FASTING LIPID PANEL:  Lab Results   Component Value Date    HDL 58 06/25/2020    HDL 54 01/05/2012    LDLCALC 108 06/25/2020    TRIG 123 06/25/2020     LIVER PROFILE:No results for input(s): AST, ALT, ALB in the last 72 hours. Assessment:   1. Sinus node dysfunction: stable   -s/p dual chamber pacemaker implant on 11/10/2017   -device check on 9/11/2020 showed normal function, 66% AP, less than 1% , and one second of PAT  2. Paroxysmal atrial tachycardia: stable   -noted in hospital, atenolol increased   -asymptomatic  3. HTN: controlled   4. ANNA: management per pulmonary     Plan:   1. Continue lisinopril and atenolol  2. Monitor BP at home and call if persistently elevated (patient has a history of hypokalemia with chlorthalidone, could trial spironolactone)  3. Annual BMP (due 6/2021)  4. Remote device transmissions every 3 months    5.  Follow up in one year    Scott [address]  [phone]

## 2020-10-22 ENCOUNTER — NURSE ONLY (OUTPATIENT)
Dept: FAMILY MEDICINE CLINIC | Age: 65
End: 2020-10-22
Payer: COMMERCIAL

## 2020-10-22 PROCEDURE — 90688 IIV4 VACCINE SPLT 0.5 ML IM: CPT | Performed by: FAMILY MEDICINE

## 2020-10-22 PROCEDURE — 90471 IMMUNIZATION ADMIN: CPT | Performed by: FAMILY MEDICINE

## 2020-10-22 NOTE — PROGRESS NOTES
Vaccine Information Sheet, \"Influenza - Inactivated\"  given to Viviana Mazariegos, or parent/legal guardian of  Viviana Mazariegos and verbalized understanding. Patient responses:    Have you ever had a reaction to a flu vaccine? No  Do you have any current illness? No  Have you ever had [place] Syndrome? No  Do you have a serious allergy to any of the follow: Neomycin, Polymyxin, Thimerosal, eggs or egg products? No    Flu vaccine given per order. Please see immunization tab. Risks and benefits explained. Current VIS given.

## 2020-12-14 RX ORDER — LISINOPRIL 40 MG/1
TABLET ORAL
Qty: 30 TABLET | Refills: 5 | Status: SHIPPED | OUTPATIENT
Start: 2020-12-14 | End: 2021-06-11

## 2020-12-14 NOTE — TELEPHONE ENCOUNTER
Future appt scheduled 12/28/2020  Last appt 06/25/2020  Refilled medication per verbal order from provider.

## 2020-12-16 NOTE — PROGRESS NOTES
We received remote transmission from patient's monitor at home. Transmission shows normal sensing and pacing function. EP physician will review. See interrogation under cardiology tab in the [facility] [address] field for more details. Pacing (% of Time Since 11-Sep-2020)  Total  10.4% (MVP On). Current ecg-Date of Interrogation: 16-Dec-2020 10:23:14 shows ventricular escape of 38 bpm when in AAA mode. No  arrhythmias recorded. Follow up in 3 months via carelink.

## 2020-12-22 ENCOUNTER — NURSE ONLY (OUTPATIENT)
Dept: CARDIOLOGY CLINIC | Age: 65
End: 2020-12-22
Payer: COMMERCIAL

## 2020-12-22 PROCEDURE — 93296 REM INTERROG EVL PM/IDS: CPT | Performed by: INTERNAL MEDICINE

## 2020-12-22 PROCEDURE — 93294 REM INTERROG EVL PM/LDLS PM: CPT | Performed by: INTERNAL MEDICINE

## 2020-12-28 ENCOUNTER — OFFICE VISIT (OUTPATIENT)
Dept: FAMILY MEDICINE CLINIC | Age: 65
End: 2020-12-28
Payer: COMMERCIAL

## 2020-12-28 VITALS
OXYGEN SATURATION: 96 % | BODY MASS INDEX: 50.39 KG/M2 | DIASTOLIC BLOOD PRESSURE: 65 MMHG | TEMPERATURE: 98.5 F | SYSTOLIC BLOOD PRESSURE: 122 MMHG | HEART RATE: 63 BPM | WEIGHT: 258 LBS

## 2020-12-28 LAB
ALT SERPL-CCNC: 28 U/L (ref 10–40)
ANION GAP SERPL CALCULATED.3IONS-SCNC: 13 MMOL/L (ref 3–16)
BILIRUBIN, POC: NORMAL
BLOOD URINE, POC: NORMAL
BUN BLDV-MCNC: 13 MG/DL (ref 7–20)
CALCIUM SERPL-MCNC: 9.8 MG/DL (ref 8.3–10.6)
CHLORIDE BLD-SCNC: 106 MMOL/L (ref 99–110)
CHOLESTEROL, TOTAL: 218 MG/DL (ref 0–199)
CLARITY, POC: NORMAL
CO2: 25 MMOL/L (ref 21–32)
COLOR, POC: YELLOW
CREAT SERPL-MCNC: 0.8 MG/DL (ref 0.6–1.2)
GFR AFRICAN AMERICAN: >60
GFR NON-AFRICAN AMERICAN: >60
GLUCOSE BLD-MCNC: 100 MG/DL (ref 70–99)
GLUCOSE URINE, POC: NORMAL
HDLC SERPL-MCNC: 57 MG/DL (ref 40–60)
KETONES, POC: NORMAL
LDL CHOLESTEROL CALCULATED: 131 MG/DL
LEUKOCYTE EST, POC: NORMAL
NITRITE, POC: NORMAL
PH, POC: 6
POTASSIUM SERPL-SCNC: 4.2 MMOL/L (ref 3.5–5.1)
PROTEIN, POC: NORMAL
SODIUM BLD-SCNC: 144 MMOL/L (ref 136–145)
SPECIFIC GRAVITY, POC: >1.03
TRIGL SERPL-MCNC: 151 MG/DL (ref 0–150)
TSH REFLEX: 2.45 UIU/ML (ref 0.27–4.2)
UROBILINOGEN, POC: 0.2
VLDLC SERPL CALC-MCNC: 30 MG/DL

## 2020-12-28 PROCEDURE — 81002 URINALYSIS NONAUTO W/O SCOPE: CPT | Performed by: FAMILY MEDICINE

## 2020-12-28 PROCEDURE — 99214 OFFICE O/P EST MOD 30 MIN: CPT | Performed by: FAMILY MEDICINE

## 2020-12-28 PROCEDURE — G8417 CALC BMI ABV UP PARAM F/U: HCPCS | Performed by: FAMILY MEDICINE

## 2020-12-28 PROCEDURE — G8427 DOCREV CUR MEDS BY ELIG CLIN: HCPCS | Performed by: FAMILY MEDICINE

## 2020-12-28 PROCEDURE — G8482 FLU IMMUNIZE ORDER/ADMIN: HCPCS | Performed by: FAMILY MEDICINE

## 2020-12-28 PROCEDURE — 36415 COLL VENOUS BLD VENIPUNCTURE: CPT | Performed by: FAMILY MEDICINE

## 2020-12-28 PROCEDURE — 1036F TOBACCO NON-USER: CPT | Performed by: FAMILY MEDICINE

## 2020-12-28 PROCEDURE — 3017F COLORECTAL CA SCREEN DOC REV: CPT | Performed by: FAMILY MEDICINE

## 2020-12-28 RX ORDER — NITROFURANTOIN 25; 75 MG/1; MG/1
100 CAPSULE ORAL 2 TIMES DAILY
Qty: 20 CAPSULE | Refills: 0 | Status: SHIPPED | OUTPATIENT
Start: 2020-12-28 | End: 2021-01-07

## 2020-12-28 RX ORDER — BETAMETHASONE DIPROPIONATE 0.5 MG/G
CREAM TOPICAL
Qty: 30 G | Refills: 5 | Status: SHIPPED | OUTPATIENT
Start: 2020-12-28 | End: 2021-09-29 | Stop reason: ALTCHOICE

## 2020-12-28 NOTE — PROGRESS NOTES
Subjective:  Miracle Paz is here to discuss the following issues. She has a history of atrial tachycardia and SA node dysfunction. She is on atenolol and has pacemaker. This was recently checked and is functioning normally. No palpitations chest pain or chest pressure. She has hyperglycemia and her weight is stable. No polydipsia or polyuria. She has essential hypertension on a combination of medications her blood pressures are well controlled. She has elevated cholesterol and on her medicine has no muscle aches or other side effects. She reports 2 weeks of urinary frequency urgency and burning without fever sweats or chills  Social History     Tobacco Use   Smoking Status Former Smoker    Packs/day: 3.00    Years: 10.00    Pack years: 30.00    Types: Cigarettes    Start date: 6/3/1975   Hallie Andres Quit date: 3/2/1986    Years since quittin.8   Smokeless Tobacco Never Used   Allergies:     Chlorthalidone    Objective:  /65   Pulse 63   Temp 98.5 °F (36.9 °C) (Oral)   Wt 258 lb (117 kg)   SpO2 96%   BMI 50.39 kg/m²    No acute distress, heart regular rate and rhythm without murmur, lungs clear to auscultation easy effort, abdomen soft nondistended, no clubbing or cyanosis    Assessment:  1. Paroxysmal atrial tachycardia ([address]    2. Sinus node dysfunction (HCC)    3. Hyperglycemia    4. Essential hypertension    5. Hypercholesteremia    6. Acute cystitis without hematuria            Plan:  Urinalysis and culture  Macrobid  Labs ordered  Continue current medicines  She has grandchildren in for second third and fifth grade  Follow-up in 6 months fasting or as needed  \"Healthy Family Handout\" provided  Avoid exposure to all tobacco products.   Read and consider all information provided by the pharmacy regarding prescribed medications before use  Careful medical compliance  Proper diet and weight management   Otherwise continue current treatment plan  Call or return if symptoms are not well controlled      All medical conditions for this patient are stable unless otherwise indicated    Nurys Monroy MD    This note was transcribed using a voice recognition software system. Proper technique and careful oversight were used to increase transcription accuracy but inadvertent errors may be present.

## 2020-12-28 NOTE — PATIENT INSTRUCTIONS
Please read the healthy family handout that you were given and share it with your family. Please compare this printed medication list with your medications at home to be sure they are the same. If you have any medications that are different please contact us immediately at [phone]. Also review your allergies that we have listed, these may also include medications that you have not been able to tolerate, make sure everything listed is correct. If you have any allergies that are different please contact us immediately at [phone].

## 2020-12-29 LAB
ESTIMATED AVERAGE GLUCOSE: 102.5 MG/DL
HBA1C MFR BLD: 5.2 %

## 2020-12-30 LAB
ORGANISM: ABNORMAL
URINE CULTURE, ROUTINE: ABNORMAL

## 2020-12-31 RX ORDER — ATORVASTATIN CALCIUM 40 MG/1
40 TABLET, FILM COATED ORAL DAILY
Qty: 30 TABLET | Refills: 5 | Status: SHIPPED | OUTPATIENT
Start: 2020-12-31 | End: 2021-07-12

## 2021-01-14 RX ORDER — MELOXICAM 15 MG/1
TABLET ORAL
Qty: 30 TABLET | Refills: 5 | Status: SHIPPED | OUTPATIENT
Start: 2021-01-14 | End: 2021-07-12

## 2021-01-14 NOTE — TELEPHONE ENCOUNTER
Refilled medication per verbal order from provider.   Future appt scheduled 07/02/2021  Last appt 12/28/2020

## 2021-02-01 ENCOUNTER — TELEPHONE (OUTPATIENT)
Dept: FAMILY MEDICINE CLINIC | Age: 66
End: 2021-02-01

## 2021-02-01 RX ORDER — AMOXICILLIN 500 MG/1
1000 CAPSULE ORAL 2 TIMES DAILY
Qty: 40 CAPSULE | Refills: 0 | Status: SHIPPED | OUTPATIENT
Start: 2021-02-01 | End: 2021-02-11

## 2021-02-01 NOTE — TELEPHONE ENCOUNTER
Patient was seen at the end of Dec and given Macrobid for urinary symptoms. She states her symptoms never completely went away. She said she is still having some burning with urination and some abdominal discomfort.   Wanted to see if you would call in abx for her again or do you want her to come in to have urine checked again first?

## 2021-02-12 RX ORDER — TERAZOSIN 5 MG/1
CAPSULE ORAL
Qty: 90 CAPSULE | Refills: 1 | Status: SHIPPED | OUTPATIENT
Start: 2021-02-12 | End: 2021-08-12

## 2021-02-12 NOTE — TELEPHONE ENCOUNTER
Future appt scheduled 07/02/2021             Last appt 12/28/2020      Last Written 06/25/2020    terazosin (HYTRIN) 5 MG capsule  #90  1 RF

## 2021-02-25 ENCOUNTER — OFFICE VISIT (OUTPATIENT)
Dept: FAMILY MEDICINE CLINIC | Age: 66
End: 2021-02-25
Payer: COMMERCIAL

## 2021-02-25 VITALS
SYSTOLIC BLOOD PRESSURE: 169 MMHG | WEIGHT: 261 LBS | OXYGEN SATURATION: 98 % | DIASTOLIC BLOOD PRESSURE: 85 MMHG | BODY MASS INDEX: 50.97 KG/M2 | HEART RATE: 61 BPM | TEMPERATURE: 99.1 F

## 2021-02-25 DIAGNOSIS — L98.9 SKIN LESION: Primary | ICD-10-CM

## 2021-02-25 PROCEDURE — 99213 OFFICE O/P EST LOW 20 MIN: CPT | Performed by: NURSE PRACTITIONER

## 2021-02-25 PROCEDURE — G8427 DOCREV CUR MEDS BY ELIG CLIN: HCPCS | Performed by: NURSE PRACTITIONER

## 2021-02-25 PROCEDURE — G8417 CALC BMI ABV UP PARAM F/U: HCPCS | Performed by: NURSE PRACTITIONER

## 2021-02-25 PROCEDURE — 3017F COLORECTAL CA SCREEN DOC REV: CPT | Performed by: NURSE PRACTITIONER

## 2021-02-25 PROCEDURE — 1036F TOBACCO NON-USER: CPT | Performed by: NURSE PRACTITIONER

## 2021-02-25 PROCEDURE — G8482 FLU IMMUNIZE ORDER/ADMIN: HCPCS | Performed by: NURSE PRACTITIONER

## 2021-02-25 ASSESSMENT — ENCOUNTER SYMPTOMS
COLOR CHANGE: 1
NAUSEA: 0
SHORTNESS OF BREATH: 0
COUGH: 0
DIARRHEA: 0
VOMITING: 0

## 2021-02-25 NOTE — PROGRESS NOTES
CHIEF COMPLAINT  Chief Complaint   Patient presents with    Other     spot on right leg        HPI   Faby Hansen is a 63 y.o. female who presents to the office complaining of lesion to right lower leg. Patient reports approximately 5 years ago she was weed eating out in the yard near [facility]. Patient reports that area appeared at that time but was nonpainful or raised. Patient reports that over the past 2 days she has noticed area discolored, itching and raised. No fever or chills. Patient denies any known injury or trauma prior to onset of symptoms. Patient reports its not painful if she pushes on it. No other complaints, modifying factors or associated symptoms. Nursing notes reviewed. Past Medical History:   Diagnosis Date    HTN     SVT (supraventricular tachycardia) (HCC)     Wrist fracture, closed, left, with routine healing, subsequent encounter     Compound     Past Surgical History:   Procedure Laterality Date    BREAST BIOPSY Right 2002    CYST REMOVAL Right      Family History   Problem Relation Age of Onset    Parkinsonism Father     Hypertension Father      Social History     Socioeconomic History    Marital status:       Spouse name: Not on file    Number of children: Not on file    Years of education: Not on file    Highest education level: Not on file   Occupational History    Not on file   Social Needs    Financial resource strain: Not on file    Food insecurity     Worry: Not on file     Inability: Not on file    Transportation needs     Medical: Not on file     Non-medical: Not on file   Tobacco Use    Smoking status: Former Smoker     Packs/day: 3.00     Years: 10.00     Pack years: 30.00     Types: Cigarettes     Start date: 6/3/1975     Quit date: 3/2/1986     Years since quittin.0    Smokeless tobacco: Never Used   Substance and Sexual Activity    Alcohol use: Yes     Comment: once in a while    Drug use: No    Sexual activity: Not on file   Lifestyle    Physical activity     Days per week: Not on file     Minutes per session: Not on file    Stress: Not on file   Relationships    Social connections     Talks on phone: Not on file     Gets together: Not on file     Attends Anabaptist service: Not on file     Active member of club or organization: Not on file     Attends meetings of clubs or organizations: Not on file     Relationship status: Not on file    Intimate partner violence     Fear of current or ex partner: Not on file     Emotionally abused: Not on file     Physically abused: Not on file     Forced sexual activity: Not on file   Other Topics Concern    Not on file   Social History Narrative    Not on file     Current Outpatient Medications   Medication Sig Dispense Refill    terazosin (HYTRIN) 5 MG capsule TAKE 1 CAPSULE NIGHTLY 90 capsule 1    meloxicam (MOBIC) 15 MG tablet TAKE (1) TABLET DAILY 30 tablet 5    atorvastatin (LIPITOR) 40 MG tablet Take 1 tablet by mouth daily 30 tablet 5    augmented betamethasone dipropionate (DIPROLENE AF) 0.05 % cream APPLY TWICE A DAY TO THE AFFECTED AREA(S) 30 g 5    lisinopril (PRINIVIL;ZESTRIL) 40 MG tablet TAKE (1) TABLET DAILY 30 tablet 5    atenolol (TENORMIN) 50 MG tablet TAKE 2 TABLETS DAILY 60 tablet 11    aspirin 81 MG EC tablet Take 81 mg by mouth daily. No current facility-administered medications for this visit. Allergies   Allergen Reactions    Chlorthalidone      Low k       REVIEW OF SYSTEMS  Review of Systems   Constitutional: Negative for activity change, fatigue and fever. HENT: Negative for congestion. Eyes: Negative for visual disturbance. Respiratory: Negative for cough and shortness of breath. Cardiovascular: Negative for chest pain and leg swelling. Gastrointestinal: Negative for diarrhea, nausea and vomiting. Genitourinary: Negative for dysuria. Musculoskeletal: Negative for arthralgias and myalgias. Skin: Positive for color change. Neurological: Negative for dizziness, weakness and headaches. Psychiatric/Behavioral: Negative for sleep disturbance. PHYSICAL EXAM  BP (!) 169/85   Pulse 61   Temp 99.1 °F (37.3 °C) (Oral)   Wt 261 lb (118.4 kg)   SpO2 98%   BMI 50.97 kg/m²   Physical Exam  Vitals signs reviewed. Constitutional:       General: She is not in acute distress. Appearance: Normal appearance. She is well-developed. She is not diaphoretic. HENT:      Head: Normocephalic and atraumatic. Right Ear: External ear normal.      Left Ear: External ear normal.      Nose: Nose normal.      Mouth/Throat:      Mouth: Mucous membranes are moist.      Pharynx: Oropharynx is clear. Eyes:      General:         Right eye: No discharge. Left eye: No discharge. Pupils: Pupils are equal, round, and reactive to light. Neck:      Musculoskeletal: Normal range of motion. Vascular: No JVD. Cardiovascular:      Rate and Rhythm: Normal rate. Pulses: Normal pulses. Pulmonary:      Effort: Pulmonary effort is normal.   Abdominal:      Palpations: Abdomen is soft. Musculoskeletal: Normal range of motion. Skin:     General: Skin is warm and dry. Capillary Refill: Capillary refill takes less than 2 seconds. Findings: Lesion present. Neurological:      Mental Status: She is alert and oriented to person, place, and time. Psychiatric:         Mood and Affect: Mood normal.         Behavior: Behavior normal.                ASSESSMENT/PLAN:   1. Skin lesion  Patient presents to the office today with concerns of discolored lesion to right lower leg. Patient reports that area has been present for over 5 years but until recently was flat with the skin/not raised, did not itch and was not painful when she pushed on it. Patient reports that over the past few days is changed in color and is itching. Patient reports that it is painful only when she pushes on it.   Patient denies any fever, chills, or injury prior to onset of worsening symptoms. I did recommend using hydrocortisone cream around the areas that are itching and avoid further trauma to the skin and follow-up with dermatology for evaluation. Patient verbalized and acknowledges with plan of care at this time. - [facility] [address] [place] [address]         The note was completed using Dragon voice recognition transcription. Every effort was made to ensure accuracy; however, inadvertent  transcription errors may be present despite my best efforts to edit errors.     Bridgette Rice, LUIZA - CNP

## 2021-03-25 NOTE — PROGRESS NOTES
We received remote transmission from patient's monitor at home. Transmission shows normal sensing and pacing function. EP physician will review. See interrogation under cardiology tab in the [facility] [address] field for more details. No  arrhythmias recorded. Follow up in 3 months via carelink.

## 2021-03-30 ENCOUNTER — NURSE ONLY (OUTPATIENT)
Dept: CARDIOLOGY CLINIC | Age: 66
End: 2021-03-30
Payer: COMMERCIAL

## 2021-03-30 DIAGNOSIS — Z95.0 PACEMAKER: ICD-10-CM

## 2021-03-30 DIAGNOSIS — I49.5 SINUS NODE DYSFUNCTION (HCC): ICD-10-CM

## 2021-03-30 PROCEDURE — 93294 REM INTERROG EVL PM/LDLS PM: CPT | Performed by: INTERNAL MEDICINE

## 2021-03-30 PROCEDURE — 93296 REM INTERROG EVL PM/IDS: CPT | Performed by: INTERNAL MEDICINE

## 2021-03-30 NOTE — LETTER
[address] [phone]  [address] [facility] [phone]    Pacemaker/Defibrillator Clinic          03/25/21        [address] [place] [id number]        Dear Josemanuel Canchola    This letter is to inform you that we received the transmission from your monitor at home that checks your implanted heart device. The next date you should transmit will be 6/29. If your report requires attention, we will notify you. Please be aware that the remote device transmission sites are periodically monitored only during regular business hours during which simultaneous in-office device clinics are being run. If your transmission requires attention, we will contact you as soon as possible. Thank you.             [address]

## 2021-06-11 RX ORDER — LISINOPRIL 40 MG/1
TABLET ORAL
Qty: 30 TABLET | Refills: 5 | Status: SHIPPED | OUTPATIENT
Start: 2021-06-11 | End: 2021-12-10

## 2021-06-11 NOTE — TELEPHONE ENCOUNTER
Future appt scheduled 07/02/2021              Last appt 02/25/2021      Last Written 12/14/2020    lisinopril (PRINIVIL;ZESTRIL) 40 MG tablet  #30  5 RF           Refilled medication per verbal order from provider.

## 2021-06-28 PROCEDURE — 93294 REM INTERROG EVL PM/LDLS PM: CPT | Performed by: INTERNAL MEDICINE

## 2021-06-28 PROCEDURE — 93296 REM INTERROG EVL PM/IDS: CPT | Performed by: INTERNAL MEDICINE

## 2021-06-28 NOTE — PROGRESS NOTES
Remote transmission received for patient's dual chamber PACEMAKER. Transmission shows normal sensing and pacing function. EP physician will review. See interrogation under the cardiology tab in the [facility] [address] field for more details. Will continue to monitor remotely. No  arrhythmias recorded.

## 2021-06-29 ENCOUNTER — NURSE ONLY (OUTPATIENT)
Dept: CARDIOLOGY CLINIC | Age: 66
End: 2021-06-29

## 2021-06-29 DIAGNOSIS — I49.5 SINUS NODE DYSFUNCTION (HCC): ICD-10-CM

## 2021-06-29 DIAGNOSIS — Z95.0 PACEMAKER: ICD-10-CM

## 2021-07-02 ENCOUNTER — OFFICE VISIT (OUTPATIENT)
Dept: FAMILY MEDICINE CLINIC | Age: 66
End: 2021-07-02
Payer: COMMERCIAL

## 2021-07-02 VITALS
TEMPERATURE: 98.1 F | WEIGHT: 262 LBS | DIASTOLIC BLOOD PRESSURE: 77 MMHG | BODY MASS INDEX: 51.17 KG/M2 | SYSTOLIC BLOOD PRESSURE: 138 MMHG | HEART RATE: 60 BPM | OXYGEN SATURATION: 96 %

## 2021-07-02 DIAGNOSIS — R06.09 DYSPNEA ON EXERTION: Primary | ICD-10-CM

## 2021-07-02 DIAGNOSIS — Z99.89 OSA ON CPAP: ICD-10-CM

## 2021-07-02 DIAGNOSIS — G47.33 OSA ON CPAP: ICD-10-CM

## 2021-07-02 DIAGNOSIS — E78.00 HYPERCHOLESTEREMIA: ICD-10-CM

## 2021-07-02 DIAGNOSIS — R73.9 HYPERGLYCEMIA: ICD-10-CM

## 2021-07-02 DIAGNOSIS — I10 ESSENTIAL HYPERTENSION: ICD-10-CM

## 2021-07-02 DIAGNOSIS — N39.41 URGE INCONTINENCE: ICD-10-CM

## 2021-07-02 DIAGNOSIS — I47.1 PAROXYSMAL ATRIAL TACHYCARDIA (HCC): ICD-10-CM

## 2021-07-02 LAB
ALT SERPL-CCNC: 77 U/L (ref 10–40)
ANION GAP SERPL CALCULATED.3IONS-SCNC: 11 MMOL/L (ref 3–16)
BILIRUBIN, POC: NORMAL
BLOOD URINE, POC: NORMAL
BUN BLDV-MCNC: 15 MG/DL (ref 7–20)
CALCIUM SERPL-MCNC: 9.4 MG/DL (ref 8.3–10.6)
CHLORIDE BLD-SCNC: 107 MMOL/L (ref 99–110)
CHOLESTEROL, TOTAL: 179 MG/DL (ref 0–199)
CLARITY, POC: CLEAR
CO2: 26 MMOL/L (ref 21–32)
COLOR, POC: YELLOW
CREAT SERPL-MCNC: 0.9 MG/DL (ref 0.6–1.2)
GFR AFRICAN AMERICAN: >60
GFR NON-AFRICAN AMERICAN: >60
GLUCOSE BLD-MCNC: 91 MG/DL (ref 70–99)
GLUCOSE URINE, POC: NORMAL
HDLC SERPL-MCNC: 56 MG/DL (ref 40–60)
KETONES, POC: NORMAL
LDL CHOLESTEROL CALCULATED: 98 MG/DL
LEUKOCYTE EST, POC: NORMAL
NITRITE, POC: NORMAL
PH, POC: 7
POTASSIUM SERPL-SCNC: 4.6 MMOL/L (ref 3.5–5.1)
PROTEIN, POC: NORMAL
SODIUM BLD-SCNC: 144 MMOL/L (ref 136–145)
SPECIFIC GRAVITY, POC: 1.01
TRIGL SERPL-MCNC: 123 MG/DL (ref 0–150)
UROBILINOGEN, POC: 0.2
VLDLC SERPL CALC-MCNC: 25 MG/DL

## 2021-07-02 PROCEDURE — 3017F COLORECTAL CA SCREEN DOC REV: CPT | Performed by: FAMILY MEDICINE

## 2021-07-02 PROCEDURE — 36415 COLL VENOUS BLD VENIPUNCTURE: CPT | Performed by: FAMILY MEDICINE

## 2021-07-02 PROCEDURE — 90750 HZV VACC RECOMBINANT IM: CPT | Performed by: FAMILY MEDICINE

## 2021-07-02 PROCEDURE — 90471 IMMUNIZATION ADMIN: CPT | Performed by: FAMILY MEDICINE

## 2021-07-02 PROCEDURE — 1036F TOBACCO NON-USER: CPT | Performed by: FAMILY MEDICINE

## 2021-07-02 PROCEDURE — G8427 DOCREV CUR MEDS BY ELIG CLIN: HCPCS | Performed by: FAMILY MEDICINE

## 2021-07-02 PROCEDURE — 99214 OFFICE O/P EST MOD 30 MIN: CPT | Performed by: FAMILY MEDICINE

## 2021-07-02 PROCEDURE — 81003 URINALYSIS AUTO W/O SCOPE: CPT | Performed by: FAMILY MEDICINE

## 2021-07-02 PROCEDURE — G8417 CALC BMI ABV UP PARAM F/U: HCPCS | Performed by: FAMILY MEDICINE

## 2021-07-02 RX ORDER — SOLIFENACIN SUCCINATE 5 MG/1
5 TABLET, FILM COATED ORAL DAILY
Qty: 30 TABLET | Refills: 5 | Status: SHIPPED | OUTPATIENT
Start: 2021-07-02 | End: 2021-12-27

## 2021-07-02 RX ORDER — ATENOLOL 50 MG/1
TABLET ORAL
Qty: 60 TABLET | Refills: 5 | Status: SHIPPED | OUTPATIENT
Start: 2021-07-02 | End: 2021-12-27

## 2021-07-02 ASSESSMENT — PATIENT HEALTH QUESTIONNAIRE - PHQ9
SUM OF ALL RESPONSES TO PHQ QUESTIONS 1-9: 0
SUM OF ALL RESPONSES TO PHQ9 QUESTIONS 1 & 2: 0
1. LITTLE INTEREST OR PLEASURE IN DOING THINGS: 0
SUM OF ALL RESPONSES TO PHQ QUESTIONS 1-9: 0
2. FEELING DOWN, DEPRESSED OR HOPELESS: 0
SUM OF ALL RESPONSES TO PHQ QUESTIONS 1-9: 0

## 2021-07-02 NOTE — PROGRESS NOTES
are stable unless otherwise indicated. Age-specific preventative health recommendations were reviewed and the [facility] was provided. Avoid exposure to tobacco products. Follow CDC guidelines for covid-19 prevention. Read and consider all information provided by the pharmacy regarding prescribed medications before use    Call or return for any problems that arise before the next scheduled appointment. Nando Osorio MD    This note was transcribed using a voice recognition software system. Proper technique and careful oversight were used to increase transcription accuracy but inadvertent errors may be present.

## 2021-07-02 NOTE — PATIENT INSTRUCTIONS
Please read the healthy family handout that you were given and share it with your family. Please compare this printed medication list with your medications at home to be sure they are the same. If you have any medications that are different please contact us immediately at [phone]. Also review your allergies that we have listed, these may also include medications that you have not been able to tolerate, make sure everything listed is correct. If you have any allergies that are different please contact us immediately at [phone].

## 2021-07-03 LAB
ESTIMATED AVERAGE GLUCOSE: 111.2 MG/DL
HBA1C MFR BLD: 5.5 %

## 2021-07-04 LAB — URINE CULTURE, ROUTINE: NORMAL

## 2021-07-09 ENCOUNTER — HOSPITAL ENCOUNTER (OUTPATIENT)
Dept: WOMENS IMAGING | Age: 66
Discharge: HOME | End: 2021-07-09
Payer: COMMERCIAL

## 2021-07-09 DIAGNOSIS — Z12.31 ENCOUNTER FOR SCREENING MAMMOGRAM FOR MALIGNANT NEOPLASM OF BREAST: ICD-10-CM

## 2021-07-09 PROCEDURE — 77067 SCR MAMMO BI INCL CAD: CPT

## 2021-07-12 RX ORDER — ATORVASTATIN CALCIUM 40 MG/1
TABLET, FILM COATED ORAL
Qty: 30 TABLET | Refills: 5 | Status: SHIPPED | OUTPATIENT
Start: 2021-07-12 | End: 2022-01-10

## 2021-07-12 RX ORDER — MELOXICAM 15 MG/1
TABLET ORAL
Qty: 30 TABLET | Refills: 5 | Status: SHIPPED | OUTPATIENT
Start: 2021-07-12 | End: 2022-01-10

## 2021-08-04 ENCOUNTER — OFFICE VISIT (OUTPATIENT)
Dept: PULMONOLOGY | Age: 66
End: 2021-08-04
Payer: COMMERCIAL

## 2021-08-04 VITALS
WEIGHT: 263.8 LBS | SYSTOLIC BLOOD PRESSURE: 177 MMHG | TEMPERATURE: 97.6 F | BODY MASS INDEX: 51.79 KG/M2 | HEART RATE: 66 BPM | DIASTOLIC BLOOD PRESSURE: 95 MMHG | RESPIRATION RATE: 18 BRPM | HEIGHT: 60 IN | OXYGEN SATURATION: 98 %

## 2021-08-04 DIAGNOSIS — I47.1 PAROXYSMAL ATRIAL TACHYCARDIA (HCC): ICD-10-CM

## 2021-08-04 DIAGNOSIS — Z72.821 POOR SLEEP HYGIENE: ICD-10-CM

## 2021-08-04 DIAGNOSIS — Z99.89 OSA ON CPAP: Primary | ICD-10-CM

## 2021-08-04 DIAGNOSIS — G47.33 OSA ON CPAP: Primary | ICD-10-CM

## 2021-08-04 DIAGNOSIS — E66.01 MORBID OBESITY (HCC): ICD-10-CM

## 2021-08-04 PROCEDURE — 3017F COLORECTAL CA SCREEN DOC REV: CPT | Performed by: INTERNAL MEDICINE

## 2021-08-04 PROCEDURE — G8427 DOCREV CUR MEDS BY ELIG CLIN: HCPCS | Performed by: INTERNAL MEDICINE

## 2021-08-04 PROCEDURE — 99213 OFFICE O/P EST LOW 20 MIN: CPT | Performed by: INTERNAL MEDICINE

## 2021-08-04 PROCEDURE — 1036F TOBACCO NON-USER: CPT | Performed by: INTERNAL MEDICINE

## 2021-08-04 PROCEDURE — G8417 CALC BMI ABV UP PARAM F/U: HCPCS | Performed by: INTERNAL MEDICINE

## 2021-08-04 NOTE — PROGRESS NOTES
HISTORY OF PRESENT ILLNESS: Gardenia Hoffman is a 61y.o. year old female with a history of SVT and syncope who presents who presents for annual follow-up for ANNA. Her PCP is Dr. Iggy Perera, cardiologists Jacob Ortiz and OLEGARIO Moreno. Swetha Bishop was last seen on 7/22/2020, returns for annual follow-up. She is on auto CPAP 5-15 cm H2O. She has a history of hypertension, diabetes mellitus, hyperlipidemia, PAF status post pacemaker placement. Recent labs showed A1c of 5.5, normal renal profile, ALT of 77, normal lipids. The patient thinks she had COVID-19 infection in 2019, although it was not confirmed. She thinks that her shortness of breath is slightly worse since then. She finds it difficult to work in her yard, doing weed eating. She babysits her granddaughters. She was told that her pacemaker did not fire. Her PCP has ordered a repeat echocardiogram.  The patient has gained \"some weight\". She loves her CPAP machine, falls asleep with it on while watching a ball game. She does have a timer. She wakes up refreshed, denies daytime sleepiness. She is cleaning the machine every 2 weeks, changes the water in her humidifier nightly. She has her homemade chinstraps that she loves. She questions whether she could get a nasal mask. There was no other change in her medical or surgical history since her last visit, the rest of her ROS was negative. CPAP compliance 7/3 through 8/1/2021  Usage 30/30 days, 100%. Adequate hours of usage. 95th percentile pressure 14.2 cm H2O, peak 14.8 cm H2O. AHI 0.5, insignificant leak    Previous notes reviewed and edited as necessary. Swetha Bishop was last seen on 3/20/19. She has morbid obesity, hypertension, hyperlipidemia, hyperglycemia, PAT status post dual-chamber pacemaker. Her DME is [facility]. The patient has been doing very well with her CPAP, but feels that the pressure is somewhat high. She says she is woken up by the high pressure.   Otherwise she is tolerating it well. She is cleaning the equipment regularly. She denies other issues. Her medications and allergies were reviewed. CPAP compliance data 6/21 through 7/20/2020  Usage 100% of her nights, average usage 8 hours and 8 minutes. She is on auto CPAP 5-20 cm H2O, 95th percentile pressure was 13.2 cm H2O, 95th percentile leak was 12.6 LPM.  AHI 0.5. Previous notes reviewed and edited as necessary. Laly Roche presents for annual follow-up for ANNA. She is very happy with her CPAP, is not sleepy in the daytime. She says she cannot sleep without it. On rare days when she goes across the street to spend the night with her granddaughters, she may not use her CPAP. She volunteers in the school. She recently had a fall, but no other change in her medical history, medications. She has been seen by Araceli Salcedo in follow-up. CPAP compliance data 2/18 through 3/19/19  Uses 29/30 days, 97%. Average usage 7 hours, 31 minutes. On auto CPAP 5-20 cm H2O.  95th percentile pressure was 15.9, maximum 17.6 cm H2O. Leak was low, 95th percentile leak 7.7 LPM.  AHI was 0.9. Previous notes reviewed and edited as necessary. The patient had a sleep study on 12/18/17 which showed moderate ANNA, AHI 18 per hour, no obstruction saturation 90%. Her sleep efficiency was low at 30%. She saw Dr. Gucci Moran in follow-up on 12/21, was placed on AutoPap. She saw Dr. Alexys Staley in follow-up on 2/23, no new recommendations were provided. The patient presents today to assess her CPAP tolerance and compliance. The patient says she struggled in the first few months to get used to the CPAP, was averaging only about 4 hours. She has gradually been tolerating this better. She has had a \"cold\" with stuffy nose and occasional rhinorrhea, and had an episode of \"laryngitis\" with complete loss of voice. She says she gets an episode every 7-8 years. This is followed by mild episode of bronchitis.   She was prescribed Zyrtec, has not been using it. She also cleans her nares with a Neti pot. She often takes care of her grandchildren. She was given a treadmill as a gift and Jerson, will make attempts to lose weight. ANNA compliance data  Her EKG did not last 30 days was 100%, average 623 minutes. She is on AutoPap 5-20 cm water, 95th percentile was 14 cm water. Maximum leak was 18.1, AHI was 1.1. Previous notes reviewed and edited as necessary. The patient said she started with cardiac arrhythmia around 2004. She had a racing heartbeat, wore a Holter monitor and was seen in [facility] from one of the cardiologists at [facility]. She was diagnosed with SVT and placed on atenolol. This helped her blood pressure as well. As she gained weight, she has had more difficulty with blood pressure control. Over the last 10 years, she has gained almost 50 pounds. On 10/15/17, she left her home to get the newspaper, came back and was sitting and reading her newspaper when she had a syncopal episode. She estimates this to last about 30 seconds, she had no injury. She then woke up with some difficulty to go to the bedroom and measure her blood pressure. The cough kept giving in error signal repeatedly, and while she was sitting at the edge of her bed, she had another syncopal episode and got wedged between the bed and her dresser, sustaining an injury to her left cheekbone that has subsequently healed. She thinks she woke up in a few seconds. She called her daughter who is a nurse, and was driven to the ER at [facility]. [place]. EKG showed pauses and she was transferred to [facility], was seen by Dr. Patrick Mcbride, who referred her to Dr. Destin Gamble. She underwent an echocardiogram and routine blood work. She has had no further episodes of syncope, but was called by [employer] for a third episode on Saturday. She was called by Dr. Destin Gamble, told that she had a 6 second pause and that she needed a pacemaker. This is scheduled for Monday.   She denies any chest pain or dyspnea. She has no cough. She does have dizziness and lightheadedness, blurred vision and vague nausea and abdominal pain around the episodes of syncope. The patient is a , her   from bladder cancer. She had been told by him that she had loud snoring, she has woken herself up snoring. She has no snorting or choking, no PND or orthopnea. Occasionally she has sweating which she thinks are due to hot flashes, she has occasional nocturnal reflux. She goes to bed around 11 PM, is playing games on her Daily Aisle, occasionally watching television when she sleeps in a recliner as she has a water bed and has been told not to use it. When her  was alive, she slept in a recliner. When she worked, she was having shifted to waking up at 4:30 AM.  Now she wakes up between 4:30 and 5:30 AM, gets out of bed because she is looking forward to babysitting her grandchildren. Over the weekends, she wakes up, but then goes back to sleep and gets out of bed later around 7 or 7:30 AM.  She thinks she is not refreshed, but she is happy to look after her grandchildren, looks forward to the day. Once or twice a week, she falls asleep between 8:30 and 9 PM, then will wake up around 1:30 AM.  She tends to watch television between 12:30 and 2 in the afternoon, falls asleep watching TV once or twice a week. She is not sleepy during driving, but does feel tired in the daytime. She occasionally bites her tongue just at the time and she is about to fall asleep, then sleeps with her mouth guard. 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 Gail Silveira, rheumatologist). She has a brother and 3 sisters, her sister may be developing Parkinson's disease. Her father  of Parkinson's disease, she is not sure what her mother  of, possibly MI. Her mother had non-Hodgkin's lymphoma.   She has 2 children, healthy. The patient was a heavy smoker, from age [age], smoked 2-3 packs per day. She does not drink alcohol. She was  in December 2013. 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 The patient's past medical, surgical, family and personal history were reviewed by me personally, changes made in EMR as needed. Echocardiogram 10/16/17  Rhythm appears to be atrial fibrillation. Left ventricular systolic function is normal with the ejection fraction estimated at 55%. No regional wall motion abnormalities. There is mild concentric left ventricular hypertrophy. Left ventricular diastolic filling pressure is indeterminate secondary to arrhythmia. The right ventricle is mildly enlarged. Right atrial size is mildly dilated . No significant valvular heart disease. CTPA 10/17/17  1. No acute findings in the chest.  Specifically, no findings of pulmonary   embolism. 2. Mild cardiomegaly with mild right ventricular hypertrophy and mild   concentric left ventricular hypertrophy. 3. A few incidental findings above for which no follow-up is recommended. Labs 10/16/17  CBC and renal profile normal. In 2013 she has had a positive SOPHY with titers 1:160, RF wa    PAST MEDICAL HISTORY:  Past Medical History:   Diagnosis Date    HTN     SVT (supraventricular tachycardia) (HCC)     Wrist fracture, closed, left, with routine healing, subsequent encounter     Compound       PAST SURGICAL HISTORY:  Past Surgical History:   Procedure Laterality Date    BREAST BIOPSY Right 2002    CYST REMOVAL Right        FAMILY HISTORY:  family history includes Hypertension in her father; Parkinsonism in her father. SOCIAL HISTORY:   reports that she quit smoking about 35 years ago. Her smoking use included cigarettes. She started smoking about 46 years ago. She has a 30.00 pack-year smoking history.  She has never used smokeless tobacco.      ALLERGIES:  Patient is allergic to chlorthalidone. REVIEW OF SYSTEMS:  Constitutional: Negative for fever, positive for weight gain  HENT: Negative for sore throat,   Eyes: Negative for redness   Respiratory: Negative for dyspnea, cough  Cardiovascular: Negative for chest pain  Gastrointestinal: Negative for vomiting, diarrhea   Genitourinary: Negative for hematuria   Musculoskeletal: Positive for arthralgias   Skin: Negative for rash  Neurological: Positive for syncope  Hematological: Negative for adenopathy  Psychiatric/Behavorial: Negative for anxiety    Objective:   PHYSICAL EXAM:  There were no vitals taken for this visit.'  Gen: Very pleasant, short, obese. No distress. Eyes: PERRL. No sclera icterus. No conjunctival injection. Glasses. ENT: No discharge. Pharynx clear. Class III airway, enlarged tonsils. External appearance of ears and nose normal. Mallampati  III  Neck: No JVD. Short and large neck  Resp: No accessory muscle use. No crackles. No wheezes. No rhonchi. CV: Regular rate. Regular rhythm. No murmur or rub. No edema. GI: Deferred. Skin: Warm, dry, normal texture and turgor. No nodule on exposed extremities. Lymph: Deferred. M/S: No cyanosis. No clubbing. No joint deformity. Neuro: Moves all four extremities. Psych: Oriented x 3. No anxiety. Awake. Alert. Intact judgement and insight.     Current Outpatient Medications   Medication Sig Dispense Refill    atorvastatin (LIPITOR) 40 MG tablet TAKE (1) TABLET DAILY 30 tablet 5    meloxicam (MOBIC) 15 MG tablet TAKE (1) TABLET DAILY 30 tablet 5    solifenacin (VESICARE) 5 MG tablet Take 1 tablet by mouth daily 30 tablet 5    atenolol (TENORMIN) 50 MG tablet TAKE 2 TABLETS DAILY 60 tablet 5    lisinopril (PRINIVIL;ZESTRIL) 40 MG tablet TAKE (1) TABLET DAILY 30 tablet 5    terazosin (HYTRIN) 5 MG capsule TAKE 1 CAPSULE NIGHTLY 90 capsule 1    augmented betamethasone dipropionate (DIPROLENE AF) 0.05 % cream APPLY TWICE A DAY TO THE AFFECTED AREA(S) 30 g 5  aspirin 81 MG EC tablet Take 81 mg by mouth daily. No current facility-administered medications for this visit. Data Reviewed:   CBC and Renal reviewed  Last CBC  Lab Results   Component Value Date    WBC 7.3 11/11/2017    RBC 5.23 11/11/2017    HGB 15.1 11/11/2017    MCV 87.7 11/11/2017     11/11/2017     Last Renal  Lab Results   Component Value Date     07/02/2021    K 4.6 07/02/2021     07/02/2021    CO2 26 07/02/2021    CO2 25 12/28/2020    CO2 24 06/25/2020    BUN 15 07/02/2021    CREATININE 0.9 07/02/2021    GLUCOSE 91 07/02/2021    CALCIUM 9.4 07/02/2021     Chest imaging reports were reviewed and imaging was reviewed by me    Assessment:     · ANNA, EDS  · Poor sleep hygiene  · Syncope  · Obesity  ·     Plan:      1. Obstructive sleep apnea, excessive daytime sleepiness  Doing extremely well on AutoPap 5-15 cm H2O, extremely good compliance and very good AHI. She is cleaning the humidifier, mask and hoses as recommended. She is well acclimated. She wishes to try a nasal mask with the chinstrap. She also mentioned that she would like to change her [employer]. 2.  Allergic rhinitis  Symptoms are not bothersome at present    3. Poor sleep hygiene  Ideally should not watch TV close to bedtime    4. Syncope, unspecified syncope type, SVT  Followed by Danielle Aden from cardiology. I did tell her that the echocardiogram may not be beneficial in terms of ruling out Covid muscle involvement, which appears less likely. 5. Class 3 obesity due to excess calories without serious comorbidity with body mass index (BMI) of 45.0 to 49.9 in adult [facility])  Should make attempts to lose weight with caloric restriction and exercise. She will use her treadmill trying to lose weight. RTC one year, call if problems.

## 2021-08-12 RX ORDER — TERAZOSIN 5 MG/1
CAPSULE ORAL
Qty: 90 CAPSULE | Refills: 1 | Status: SHIPPED | OUTPATIENT
Start: 2021-08-12 | End: 2022-02-09

## 2021-08-12 NOTE — TELEPHONE ENCOUNTER
Future appt scheduled 01/10/2022                 Last appt 07/02/2021      Last Written 02/12/2021    terazosin (HYTRIN) 5 MG capsule  #90  1 RF           Refilled medication per verbal order from provider.

## 2021-09-29 ENCOUNTER — NURSE ONLY (OUTPATIENT)
Dept: CARDIOLOGY CLINIC | Age: 66
End: 2021-09-29
Payer: COMMERCIAL

## 2021-09-29 ENCOUNTER — OFFICE VISIT (OUTPATIENT)
Dept: CARDIOLOGY CLINIC | Age: 66
End: 2021-09-29
Payer: COMMERCIAL

## 2021-09-29 VITALS
HEART RATE: 62 BPM | WEIGHT: 257.5 LBS | SYSTOLIC BLOOD PRESSURE: 125 MMHG | HEIGHT: 60 IN | DIASTOLIC BLOOD PRESSURE: 80 MMHG | OXYGEN SATURATION: 98 % | BODY MASS INDEX: 50.55 KG/M2

## 2021-09-29 DIAGNOSIS — I47.1 PAROXYSMAL ATRIAL TACHYCARDIA (HCC): ICD-10-CM

## 2021-09-29 DIAGNOSIS — I49.5 SINUS NODE DYSFUNCTION (HCC): ICD-10-CM

## 2021-09-29 DIAGNOSIS — Z95.0 PACEMAKER: ICD-10-CM

## 2021-09-29 DIAGNOSIS — I10 ESSENTIAL HYPERTENSION: ICD-10-CM

## 2021-09-29 DIAGNOSIS — I49.5 SINUS NODE DYSFUNCTION (HCC): Primary | ICD-10-CM

## 2021-09-29 PROCEDURE — 1036F TOBACCO NON-USER: CPT | Performed by: NURSE PRACTITIONER

## 2021-09-29 PROCEDURE — 3017F COLORECTAL CA SCREEN DOC REV: CPT | Performed by: NURSE PRACTITIONER

## 2021-09-29 PROCEDURE — 93280 PM DEVICE PROGR EVAL DUAL: CPT | Performed by: INTERNAL MEDICINE

## 2021-09-29 PROCEDURE — G8417 CALC BMI ABV UP PARAM F/U: HCPCS | Performed by: NURSE PRACTITIONER

## 2021-09-29 PROCEDURE — 99214 OFFICE O/P EST MOD 30 MIN: CPT | Performed by: NURSE PRACTITIONER

## 2021-09-29 PROCEDURE — G8427 DOCREV CUR MEDS BY ELIG CLIN: HCPCS | Performed by: NURSE PRACTITIONER

## 2021-09-29 NOTE — LETTER
[address] Cardiology - [address] [facility] [address] [facility],First Floor [address]  Phone: [phone]  Fax: [phone] [facility], APRN - CNP    October 4, 2021     Dakota Zurita, [address] [address]    Patient: Radha Batista   MR Number: <[id number]>   YOB: 1955   Date of Visit: 9/29/2021       Dear Dakota Zurita: Thank you for referring Carline Hernandez to me for evaluation/treatment. Below are the relevant portions of my assessment and plan of care. If you have questions, please do not hesitate to call me. I look forward to following Tiffany Donato along with you.     Sincerely,      LUIZA [place] CNP

## 2021-09-29 NOTE — PROGRESS NOTES
[address]   Electrophysiology  Note              Date:  September 29, 2021  Patient name: Fernie Blank  YOB: 1955    Primary Care physician: Dilan Hunt MD    HISTORY OF PRESENT ILLNESS: The patient is a 61 y.o.  female with a history of sinus node dysfunction, syncope, PAT, HTN, and ANNA. She was admitted in 10/2017 with syncope. Echo showed an EF of 55%. She was discharged with a 30 day event monitor and multiple pauses were detected. She had a 6.4 second pause with syncope and had a dual chamber pacemaker implanted on 11/10/2017. PAT was detected during her hospital stay and she was restarted on atenolol. Device checks have been normal to date. Today she is being seen for sinus node dysfunction. She complains of some mild SOB with exertion. Denies chest pain, palpitations, and dizziness. Device check today shows:   Brand: [employer]  Mode: AAI-DDD  Normal function   68% AP  8%    Arrhythmias: none  Battery life 6 years  RA impedance 494 ohms   RV impedance 665 ohms   RA threshold 1.5 V @ 0.4 ms  RV threshold 2.0 V @ 0.4 ms  RA sensitivity 0.3 mV  RV sensitivity 0.9 mV      Past Medical History:   has a past medical history of HTN, SVT (supraventricular tachycardia) ([address] and Wrist fracture, closed, left, with routine healing, subsequent encounter. Past Surgical History:   has a past surgical history that includes Breast biopsy (Right, 2002) and cyst removal (Right). Home Medications:    Prior to Admission medications    Medication Sig Start Date End Date Taking?  Authorizing Provider   terazosin (HYTRIN) 5 MG capsule TAKE 1 CAPSULE NIGHTLY 8/12/21  Yes Dilan Hunt MD   atorvastatin (LIPITOR) 40 MG tablet TAKE (1) TABLET DAILY 7/12/21  Yes Dilan Hunt MD   meloxicam [facility]) 15 MG tablet TAKE (1) TABLET DAILY 7/12/21  Yes Dilan Hunt MD   solifenacin (VESICARE) 5 MG tablet Take 1 tablet by mouth daily 7/2/21  Yes Dilan Hunt MD atenolol (TENORMIN) 50 MG tablet TAKE 2 TABLETS DAILY 7/2/21  Yes Sugey Goldman MD   lisinopril (PRINIVIL;ZESTRIL) 40 MG tablet TAKE (1) TABLET DAILY 6/11/21  Yes Sugey Goldman MD   aspirin 81 MG EC tablet Take 81 mg by mouth daily. Yes Historical Provider, MD   augmented betamethasone dipropionate (DIPROLENE AF) 0.05 % cream APPLY TWICE A DAY TO THE AFFECTED AREA(S)  Patient not taking: Reported on 9/29/2021 12/28/20   Sugey Goldman MD       Allergies:  Chlorthalidone    Social History:   reports that she quit smoking about 35 years ago. Her smoking use included cigarettes. She started smoking about 46 years ago. She has a 30.00 pack-year smoking history. She has never used smokeless tobacco. She reports current alcohol use. She reports that she does not use drugs. Family History: family history includes Hypertension in her father; Parkinsonism in her father. Review of Systems   Constitutional: Negative  HENT: Negative. Eyes: Negative. Respiratory: Negative. Cardiovascular: see HPI  Gastrointestinal: Negative. Genitourinary: Negative. Musculoskeletal: Negative. Skin: Negative. Neurological: Negative. Hematological: Negative. Psychiatric/Behavioral: Negative. PHYSICAL EXAM:    Vital signs:    /80   Pulse 62   Ht 5' (1.524 m)   Wt 257 lb 8 oz (116.8 kg)   SpO2 98%   BMI 50.29 kg/m²      Constitutional and general appearance: alert, cooperative, no distress and appears stated age  HEENT: PERRL, no cervical lymphadenopathy. No masses palpable.  Normal oral mucosa  Respiratory:  · Normal excursion and expansion without use of accessory muscles  · Resp auscultation: Normal breath sounds without dullness or wheezing  Cardiovascular:  · The apical impulse is not displaced  · Heart tones are crisp and normal. Regular S1 and S2.  · Jugular venous pulsation Normal  · The carotid upstroke is normal in amplitude and contour without delay or bruit  · Peripheral pulses are symmetrical and full   Abdomen:  · No masses or tenderness  · Bowel sounds present  Extremities:  ·  No cyanosis or clubbing  ·  No lower extremity edema  ·  Skin: warm and dry; left upper chest incision is closed and healed  Neurological:  · Alert and oriented  · Moves all extremities well  · No abnormalities of mood, affect, memory, mentation, or behavior are noted    DATA:    ECG 11/10/2017:  SR with PAT     Echo 10/16/2017:  Rhythm appears to be atrial fibrillations.   Left ventricular systolic function is normal with the ejection fraction estimated at 55%.   No regional wall motion abnormalities.   There is mild concentric left ventricular hypertrophy.   Left ventricular diastolic filling pressure is indeterminate secondary to arrhythmia.   The right ventricle is mildly enlarged.   Right atrial size is mildly dilated. No significant valvular heart disease. CARDIOLOGY LABS:   CBC: No results for input(s): WBC, HGB, HCT, PLT in the last 72 hours. BMP: No results for input(s): NA, K, CO2, BUN, CREATININE, LABGLOM, GLUCOSE in the last 72 hours. PT/INR: No results for input(s): PROTIME, INR in the last 72 hours. APTT:No results for input(s): APTT in the last 72 hours. FASTING LIPID PANEL:  Lab Results   Component Value Date    HDL 56 07/02/2021    HDL 54 01/05/2012    LDLCALC 98 07/02/2021    TRIG 123 07/02/2021     LIVER PROFILE:No results for input(s): AST, ALT, ALB in the last 72 hours. Assessment:   1. Sinus node dysfunction: stable   -s/p dual chamber pacemaker implant on 11/10/2017   -device check today shows normal function as noted in HPI   2. Paroxysmal atrial tachycardia: stable    -noted in hospital, atenolol increased   -asymptomatic  3. HTN: controlled    -hypokalemia with chlorthalidone  4. ANNA: management per pulmonary     Plan:   1. Continue lisinopril, terazosin, and atenolol   2. Monitor BP at home and call if consistently out of goal ranges   3. Annual BMP (due 7/2022)  4. Remote device transmissions every 3 months    5.  Follow up in one year    MDM: LUIZA Mar-MARIA T  [facility]  [phone]

## 2021-09-29 NOTE — PROGRESS NOTES
Patient presents to the device clinic today for a programming evaluation for her pacemaker. Patient has a history of SND and pAT. Takes atenolol. Last device interrogation was on 6/28. Since then, no arrhythmias recorded. AP 67.6%  8.3%    All sensing and pacing parameters are within normal range. No changes need to be made at this time. Patient education was provided about device functionality, in home monitoring, and any other patient questions and/or concerns were addressed. Patient voices understanding. Please see interrogation for more detail. Patient will see RODDY Chamorro today in office. Patient will follow up in 3 months in office or remotely. See Paceart report under the Cardiology tab.

## 2021-11-01 ENCOUNTER — IMMUNIZATION (OUTPATIENT)
Dept: FAMILY MEDICINE CLINIC | Age: 66
End: 2021-11-01
Payer: COMMERCIAL

## 2021-11-01 DIAGNOSIS — Z23 NEED FOR INFLUENZA VACCINATION: Primary | ICD-10-CM

## 2021-11-01 PROCEDURE — 90471 IMMUNIZATION ADMIN: CPT | Performed by: FAMILY MEDICINE

## 2021-11-01 PROCEDURE — 90674 CCIIV4 VAC NO PRSV 0.5 ML IM: CPT | Performed by: FAMILY MEDICINE

## 2021-12-10 RX ORDER — LISINOPRIL 40 MG/1
TABLET ORAL
Qty: 30 TABLET | Refills: 5 | Status: SHIPPED | OUTPATIENT
Start: 2021-12-10 | End: 2022-06-08

## 2021-12-10 NOTE — TELEPHONE ENCOUNTER
Future appt scheduled 01/10/2022                    Last appt 07/02/2021      Last Written 06/11/2021    lisinopril (PRINIVIL;ZESTRIL) 40 MG tablet  #30  5 RF       Refilled medication per verbal order from provider.

## 2021-12-27 PROCEDURE — 93294 REM INTERROG EVL PM/LDLS PM: CPT | Performed by: INTERNAL MEDICINE

## 2021-12-27 PROCEDURE — 93296 REM INTERROG EVL PM/IDS: CPT | Performed by: INTERNAL MEDICINE

## 2021-12-27 RX ORDER — ATENOLOL 50 MG/1
TABLET ORAL
Qty: 60 TABLET | Refills: 5 | Status: SHIPPED | OUTPATIENT
Start: 2021-12-27 | End: 2022-06-08

## 2021-12-27 RX ORDER — SOLIFENACIN SUCCINATE 5 MG/1
5 TABLET, FILM COATED ORAL DAILY
Qty: 30 TABLET | Refills: 5 | Status: SHIPPED | OUTPATIENT
Start: 2021-12-27 | End: 2022-01-27 | Stop reason: CLARIF

## 2021-12-27 NOTE — PROGRESS NOTES
Remote transmission received for patient's dual chamber PACEMAKER. Transmission shows normal sensing and pacing function. EP physician will review. See interrogation under the cardiology tab in the [address] [place] [address] [address] field for more details. Will continue to monitor remotely. Pacing (% of Time Since 29-Sep-2021)  Total  60.8% (MVP On). No  arrhythmias recorded.

## 2021-12-28 ENCOUNTER — NURSE ONLY (OUTPATIENT)
Dept: CARDIOLOGY CLINIC | Age: 66
End: 2021-12-28
Payer: COMMERCIAL

## 2021-12-28 DIAGNOSIS — I49.5 SINUS NODE DYSFUNCTION (HCC): ICD-10-CM

## 2021-12-28 DIAGNOSIS — Z95.0 PACEMAKER: ICD-10-CM

## 2022-01-10 RX ORDER — MELOXICAM 15 MG/1
TABLET ORAL
Qty: 30 TABLET | Refills: 5 | Status: SHIPPED | OUTPATIENT
Start: 2022-01-10 | End: 2022-06-08

## 2022-01-10 RX ORDER — ATORVASTATIN CALCIUM 40 MG/1
TABLET, FILM COATED ORAL
Qty: 30 TABLET | Refills: 5 | Status: SHIPPED | OUTPATIENT
Start: 2022-01-10 | End: 2022-06-08

## 2022-01-27 RX ORDER — OXYBUTYNIN CHLORIDE 5 MG/1
5 TABLET ORAL 2 TIMES DAILY
Qty: 60 TABLET | Refills: 5 | Status: SHIPPED | OUTPATIENT
Start: 2022-01-27 | End: 2022-01-27 | Stop reason: CLARIF

## 2022-01-27 RX ORDER — SOLIFENACIN SUCCINATE 5 MG/1
5 TABLET, FILM COATED ORAL DAILY
Qty: 30 TABLET | Refills: 5 | OUTPATIENT
Start: 2022-01-27 | End: 2022-06-08

## 2022-01-27 NOTE — TELEPHONE ENCOUNTER
Pharmacy called back and said that the oxybutynin was to expensive and it was cheaper to do the cash price for the Vesicare so the patient is going to stay on the [address]

## 2022-02-07 ENCOUNTER — OFFICE VISIT (OUTPATIENT)
Dept: FAMILY MEDICINE CLINIC | Age: 67
End: 2022-02-07
Payer: COMMERCIAL

## 2022-02-07 VITALS
BODY MASS INDEX: 49.61 KG/M2 | DIASTOLIC BLOOD PRESSURE: 82 MMHG | SYSTOLIC BLOOD PRESSURE: 130 MMHG | HEART RATE: 60 BPM | OXYGEN SATURATION: 97 % | WEIGHT: 254 LBS | TEMPERATURE: 98.8 F

## 2022-02-07 DIAGNOSIS — Z95.0 PACEMAKER: ICD-10-CM

## 2022-02-07 DIAGNOSIS — I10 ESSENTIAL HYPERTENSION: ICD-10-CM

## 2022-02-07 DIAGNOSIS — I49.5 SINUS NODE DYSFUNCTION (HCC): Primary | ICD-10-CM

## 2022-02-07 DIAGNOSIS — I47.1 PAROXYSMAL ATRIAL TACHYCARDIA (HCC): ICD-10-CM

## 2022-02-07 DIAGNOSIS — E78.00 HYPERCHOLESTEREMIA: ICD-10-CM

## 2022-02-07 DIAGNOSIS — R73.9 HYPERGLYCEMIA: ICD-10-CM

## 2022-02-07 DIAGNOSIS — Z23 NEED FOR SHINGLES VACCINE: ICD-10-CM

## 2022-02-07 LAB
ALT SERPL-CCNC: 31 U/L (ref 10–40)
ANION GAP SERPL CALCULATED.3IONS-SCNC: 15 MMOL/L (ref 3–16)
BUN BLDV-MCNC: 8 MG/DL (ref 7–20)
CALCIUM SERPL-MCNC: 9.5 MG/DL (ref 8.3–10.6)
CHLORIDE BLD-SCNC: 106 MMOL/L (ref 99–110)
CHOLESTEROL, TOTAL: 183 MG/DL (ref 0–199)
CO2: 23 MMOL/L (ref 21–32)
CREAT SERPL-MCNC: 0.8 MG/DL (ref 0.6–1.2)
GFR AFRICAN AMERICAN: >60
GFR NON-AFRICAN AMERICAN: >60
GLUCOSE BLD-MCNC: 97 MG/DL (ref 70–99)
HDLC SERPL-MCNC: 51 MG/DL (ref 40–60)
LDL CHOLESTEROL CALCULATED: 105 MG/DL
POTASSIUM SERPL-SCNC: 4.4 MMOL/L (ref 3.5–5.1)
SODIUM BLD-SCNC: 144 MMOL/L (ref 136–145)
TRIGL SERPL-MCNC: 135 MG/DL (ref 0–150)
VLDLC SERPL CALC-MCNC: 27 MG/DL

## 2022-02-07 PROCEDURE — 3017F COLORECTAL CA SCREEN DOC REV: CPT | Performed by: FAMILY MEDICINE

## 2022-02-07 PROCEDURE — 1036F TOBACCO NON-USER: CPT | Performed by: FAMILY MEDICINE

## 2022-02-07 PROCEDURE — G8427 DOCREV CUR MEDS BY ELIG CLIN: HCPCS | Performed by: FAMILY MEDICINE

## 2022-02-07 PROCEDURE — 99214 OFFICE O/P EST MOD 30 MIN: CPT | Performed by: FAMILY MEDICINE

## 2022-02-07 PROCEDURE — G8417 CALC BMI ABV UP PARAM F/U: HCPCS | Performed by: FAMILY MEDICINE

## 2022-02-07 PROCEDURE — 90750 HZV VACC RECOMBINANT IM: CPT | Performed by: FAMILY MEDICINE

## 2022-02-07 PROCEDURE — 90471 IMMUNIZATION ADMIN: CPT | Performed by: FAMILY MEDICINE

## 2022-02-07 PROCEDURE — G8482 FLU IMMUNIZE ORDER/ADMIN: HCPCS | Performed by: FAMILY MEDICINE

## 2022-02-07 PROCEDURE — 36415 COLL VENOUS BLD VENIPUNCTURE: CPT | Performed by: FAMILY MEDICINE

## 2022-02-07 NOTE — PATIENT INSTRUCTIONS
Please read the healthy family handout that you were given and share it with your family. Please compare this printed medication list with your medications at home to be sure they are the same. If you have any medications that are different please contact us immediately at [phone]. Also review your allergies that we have listed, these may also include medications that you have not been able to tolerate, make sure everything listed is correct. If you have any allergies that are different please contact us immediately at [phone].

## 2022-02-07 NOTE — PROGRESS NOTES
Subjective:  Mateo Nascimento is here to discuss the following issues. He has sinus node dysfunction and a pacemaker. She her last follow-up with cardiology was favorable with no medication changes. She has a history of atrial tachycardia with no recent recurrence. At home blood pressure is 130s over 80s. She has no chest pain or pressure. She has hypertension and on her combination of medications blood pressures have been good. She has hyperglycemia with no polydipsia or polyuria. She has elevated cholesterol continues on Lipitor. Social History     Tobacco Use   Smoking Status Former Smoker    Packs/day: 3.00    Years: 10.00    Pack years: 30.00    Types: Cigarettes    Start date: 6/3/1975   Lara Quit date: 3/2/1986    Years since quittin.9   Smokeless Tobacco Never Used   Allergies:     Chlorthalidone    Objective:  BP (!) 152/81   Pulse 60   Temp 98.8 °F (37.1 °C) (Oral)   Wt 254 lb (115.2 kg)   SpO2 97%   BMI 49.61 kg/m²    No acute distress, heart regular rate and rhythm without murmur, lungs clear to auscultation easy effort, abdomen soft nondistended, no clubbing or cyanosis    Assessment:  1. Sinus node dysfunction (HCC)    2. Pacemaker    3. Paroxysmal atrial tachycardia ([facility] [address]    4. Essential hypertension    5. Hyperglycemia    6. Hypercholesteremia            Plan:  Labs ordered  Continue current medicines  Second shingles shot today  She had her Covid vaccine booster  She has a 13year-old grandson who will soon be learning to drive  Her daughter Swati Gillespie and her son [facility] often have her babysitting her grandchildren  She has grandchildren in first second third and fifth grade  Follow-up in 6 months fasting or. The diagnoses listed in the assessment above are stable unless otherwise indicated. Age-specific preventative health recommendations were reviewed and the [facility]" was provided. Avoid exposure to tobacco products. Follow CDC guidelines for covid-19 prevention.   Read and consider all information provided by the pharmacy regarding prescribed medications before use    Call or return for any problems that arise before the next scheduled appointment. Augie Marquez MD    This note was transcribed using a voice recognition software system. Proper technique and careful oversight were used to increase transcription accuracy but inadvertent errors may be present.

## 2022-02-08 LAB
ESTIMATED AVERAGE GLUCOSE: 111.2 MG/DL
HBA1C MFR BLD: 5.5 %

## 2022-02-09 RX ORDER — TERAZOSIN 5 MG/1
CAPSULE ORAL
Qty: 90 CAPSULE | Refills: 1 | Status: SHIPPED | OUTPATIENT
Start: 2022-02-09 | End: 2022-07-08

## 2022-03-28 NOTE — PROGRESS NOTES
Remote transmission received for patient's dual chamber PACEMAKER. Transmission shows normal sensing and pacing function. EP physician will review. See interrogation under the cardiology tab in the [address] [place] [address] [address] field for more details. Will continue to monitor remotely. Pacing (% of Time Since 27-Dec-2021)  Total  56.0% (MVP On)   No  arrhythmias recorded.

## 2022-03-29 ENCOUNTER — NURSE ONLY (OUTPATIENT)
Dept: CARDIOLOGY CLINIC | Age: 67
End: 2022-03-29
Payer: COMMERCIAL

## 2022-03-29 DIAGNOSIS — Z95.0 PACEMAKER: ICD-10-CM

## 2022-03-29 DIAGNOSIS — I49.5 SINUS NODE DYSFUNCTION (HCC): ICD-10-CM

## 2022-03-31 PROCEDURE — 93296 REM INTERROG EVL PM/IDS: CPT | Performed by: INTERNAL MEDICINE

## 2022-03-31 PROCEDURE — 93294 REM INTERROG EVL PM/LDLS PM: CPT | Performed by: INTERNAL MEDICINE

## 2022-06-08 RX ORDER — MELOXICAM 15 MG/1
TABLET ORAL
Qty: 30 TABLET | Refills: 5 | Status: SHIPPED | OUTPATIENT
Start: 2022-06-08 | End: 2022-08-09 | Stop reason: SDUPTHER

## 2022-06-08 RX ORDER — LISINOPRIL 40 MG/1
TABLET ORAL
Qty: 30 TABLET | Refills: 5 | Status: SHIPPED | OUTPATIENT
Start: 2022-06-08

## 2022-06-08 RX ORDER — ATENOLOL 50 MG/1
TABLET ORAL
Qty: 60 TABLET | Refills: 5 | Status: SHIPPED | OUTPATIENT
Start: 2022-06-08

## 2022-06-08 RX ORDER — ATORVASTATIN CALCIUM 40 MG/1
TABLET, FILM COATED ORAL
Qty: 30 TABLET | Refills: 5 | Status: SHIPPED | OUTPATIENT
Start: 2022-06-08

## 2022-06-08 RX ORDER — SOLIFENACIN SUCCINATE 5 MG/1
5 TABLET, FILM COATED ORAL DAILY
Qty: 30 TABLET | Refills: 5 | Status: SHIPPED | OUTPATIENT
Start: 2022-06-08

## 2022-06-08 NOTE — TELEPHONE ENCOUNTER
Future appt scheduled 08/08/2022                    Last appt 02/07/2022      Last Written     meloxicam (MOBIC) 15 MG tablet  01/10/2022  #30  5 RF     atorvastatin (LIPITOR) 40 MG tablet  01/10/2022  #30  5 RF     solifenacin (VESICARE) 5 MG tablet  01/27/2022  #30  5 Rf     atenolol (TENORMIN) 50 MG tablet  12/27/2021  #60  5 Rf     lisinopril (PRINIVIL;ZESTRIL) 40 MG tablet  12/10/2021  #30  5 Rf

## 2022-06-28 ENCOUNTER — NURSE ONLY (OUTPATIENT)
Dept: CARDIOLOGY CLINIC | Age: 67
End: 2022-06-28
Payer: COMMERCIAL

## 2022-06-28 DIAGNOSIS — Z95.0 PACEMAKER: ICD-10-CM

## 2022-06-28 DIAGNOSIS — I49.5 SINUS NODE DYSFUNCTION (HCC): Primary | ICD-10-CM

## 2022-06-28 PROCEDURE — 93294 REM INTERROG EVL PM/LDLS PM: CPT | Performed by: INTERNAL MEDICINE

## 2022-06-28 PROCEDURE — 93296 REM INTERROG EVL PM/IDS: CPT | Performed by: INTERNAL MEDICINE

## 2022-07-08 RX ORDER — TERAZOSIN 5 MG/1
CAPSULE ORAL
Qty: 30 CAPSULE | Refills: 1 | Status: SHIPPED | OUTPATIENT
Start: 2022-07-08 | End: 2022-10-10

## 2022-08-01 ENCOUNTER — TELEMEDICINE (OUTPATIENT)
Dept: FAMILY MEDICINE CLINIC | Age: 67
End: 2022-08-01

## 2022-08-01 DIAGNOSIS — Z00.00 INITIAL MEDICARE ANNUAL WELLNESS VISIT: Primary | ICD-10-CM

## 2022-08-01 PROCEDURE — G0438 PPPS, INITIAL VISIT: HCPCS | Performed by: FAMILY MEDICINE

## 2022-08-01 PROCEDURE — 1123F ACP DISCUSS/DSCN MKR DOCD: CPT | Performed by: FAMILY MEDICINE

## 2022-08-01 SDOH — ECONOMIC STABILITY: FOOD INSECURITY: WITHIN THE PAST 12 MONTHS, THE FOOD YOU BOUGHT JUST DIDN'T LAST AND YOU DIDN'T HAVE MONEY TO GET MORE.: NEVER TRUE

## 2022-08-01 SDOH — ECONOMIC STABILITY: FOOD INSECURITY: WITHIN THE PAST 12 MONTHS, YOU WORRIED THAT YOUR FOOD WOULD RUN OUT BEFORE YOU GOT MONEY TO BUY MORE.: NEVER TRUE

## 2022-08-01 ASSESSMENT — SOCIAL DETERMINANTS OF HEALTH (SDOH): HOW HARD IS IT FOR YOU TO PAY FOR THE VERY BASICS LIKE FOOD, HOUSING, MEDICAL CARE, AND HEATING?: NOT HARD AT ALL

## 2022-08-01 ASSESSMENT — PATIENT HEALTH QUESTIONNAIRE - PHQ9
SUM OF ALL RESPONSES TO PHQ QUESTIONS 1-9: 0
2. FEELING DOWN, DEPRESSED OR HOPELESS: 0
SUM OF ALL RESPONSES TO PHQ9 QUESTIONS 1 & 2: 0
SUM OF ALL RESPONSES TO PHQ QUESTIONS 1-9: 0
1. LITTLE INTEREST OR PLEASURE IN DOING THINGS: 0

## 2022-08-01 ASSESSMENT — LIFESTYLE VARIABLES
HOW MANY STANDARD DRINKS CONTAINING ALCOHOL DO YOU HAVE ON A TYPICAL DAY: PATIENT DOES NOT DRINK
HOW OFTEN DO YOU HAVE A DRINK CONTAINING ALCOHOL: NEVER

## 2022-08-01 NOTE — PATIENT INSTRUCTIONS
Personalized Preventive Plan for Kenyatta Garnica - 8/1/2022  Medicare offers a range of preventive health benefits. Some of the tests and screenings are paid in full while other may be subject to a deductible, co-insurance, and/or copay. Some of these benefits include a comprehensive review of your medical history including lifestyle, illnesses that may run in your family, and various assessments and screenings as appropriate. After reviewing your medical record and screening and assessments performed today your provider may have ordered immunizations, labs, imaging, and/or referrals for you. A list of these orders (if applicable) as well as your Preventive Care list are included within your After Visit Summary for your review. Other Preventive Recommendations:    A preventive eye exam performed by an eye specialist is recommended every 1-2 years to screen for glaucoma; cataracts, macular degeneration, and other eye disorders. A preventive dental visit is recommended every 6 months. Try to get at least 150 minutes of exercise per week or 10,000 steps per day on a pedometer . Order or download the FREE \"Exercise & Physical Activity: Your Everyday Guide\" from [employer]. Call 0-805.701.7838 or search [employer] online. You need 0393-4309 mg of calcium and 2692-9962 IU of vitamin D per day. It is possible to meet your calcium requirement with diet alone, but a vitamin D supplement is usually necessary to meet this goal.  When exposed to the sun, use a sunscreen that protects against both UVA and UVB radiation with an SPF of 30 or greater. Reapply every 2 to 3 hours or after sweating, drying off with a towel, or swimming. Always wear a seat belt when traveling in a car. Always wear a helmet when riding a bicycle or motorcycle.

## 2022-08-01 NOTE — PROGRESS NOTES
Medicare Annual Wellness Visit    Ingrid Moura is here for Medicare AWV    Assessment & Plan   Initial Medicare annual wellness visit    Recommendations for Preventive Services Due: see orders and patient instructions/AVS.  Recommended screening schedule for the next 5-10 years is provided to the patient in written form: see Patient Instructions/AVS.     No follow-ups on file. Subjective       Patient's complete Health Risk Assessment and screening values have been reviewed and are found in Flowsheets. The following problems were reviewed today and where indicated follow up appointments were made and/or referrals ordered.     Positive Risk Factor Screenings with Interventions:    Fall Risk:  Do you feel unsteady or are you worried about falling? : (!) yes  2 or more falls in past year?: no  Fall with injury in past year?: no   Fall Risk Interventions:    Patient declines any further evaluation/treatment for this issue            General Health and ACP:  General  In general, how would you say your health is?: Good  In the past 7 days, have you experienced any of the following: New or Increased Pain, New or Increased Fatigue, Loneliness, Social Isolation, Stress or Anger?: No  Do you get the social and emotional support that you need?: Yes  Do you have a Living Will?: Yes    Advance Directives       Power of  Living Will ACP-Advance Directive ACP-Power of     Not on File Not on File Not on File Not on File          General Health Risk Interventions:  No Living Will: ACP documents already completed- patient asked to provide copy to the office    Health Habits/Nutrition:  Physical Activity: Insufficiently Active    Days of Exercise per Week: 1 day    Minutes of Exercise per Session: 10 min     Have you lost any weight without trying in the past 3 months?: No     Have you seen the dentist within the past year?: Yes  Health Habits/Nutrition Interventions:  No interventions needed at this time.    Hearing/Vision:  Do you or your family notice any trouble with your hearing that hasn't been managed with hearing aids?: No  Do you have difficulty driving, watching TV, or doing any of your daily activities because of your eyesight?: (!) Yes  Have you had an eye exam within the past year?: Appointment is scheduled  No results found. Hearing/Vision Interventions:  Vision concerns:  patient encouraged to make appointment with his/her eye specialist            Objective      Patient-Reported Vitals  Patient-Reported Systolic (Top): 503 mmHg  Patient-Reported Diastolic (Bottom): 70 mmHg  Patient-Reported Pulse: 60  Patient-Reported Weight: 260 lbs            Allergies   Allergen Reactions    Chlorthalidone      Low k     Prior to Visit Medications    Medication Sig Taking? Authorizing Provider   terazosin (HYTRIN) 5 MG capsule TAKE 1 CAPSULE NIGHTLY  Janki Kim MD   lisinopril (PRINIVIL;ZESTRIL) 40 MG tablet TAKE (1) TABLET DAILY  Janki Kim MD   atenolol (TENORMIN) 50 MG tablet TAKE 2 TABLETS DAILY  Janki Kim MD   solifenacin (VESICARE) 5 MG tablet TAKE 1 TABLET BY MOUTH DAILY  Janki Kim MD   atorvastatin (LIPITOR) 40 MG tablet TAKE (1) TABLET DAILY  Janki Kim MD   meloxicam (MOBIC) 15 MG tablet TAKE (1) TABLET DAILY  Janki Kim MD   aspirin 81 MG EC tablet Take 81 mg by mouth daily. Historical Provider, MD Arevalo (Including outside providers/suppliers regularly involved in providing care):   Patient Care Team:  Janki Kim MD as PCP - Armond Melissa MD as PCP - [facility] Empaneled Provider     Reviewed and updated this visit:  Tobacco  Allergies  Meds  Med Hx  Surg Hx  Soc Hx  Fam Hx          [place], was evaluated through a synchronous (real-time) telephone encounter. The patient (or guardian if applicable) is aware that this is a billable service, which includes applicable co-pays.  This Virtual Visit was conducted with patient's (and/or legal guardian's) consent. The visit was conducted pursuant to the emergency declaration under the [facility] [address] authority and the [employer] General Act. Patient identification was verified, and a caregiver was present when appropriate. The patient was located at Home: [address] [place] [address]. Provider was located at [facility] (Appt Dept): [facility] [address] [place] [address] Dr. Avery Biswas LPN, 1/[birth year], performed the documented evaluation under the direct supervision of the attending physician.

## 2022-08-04 ENCOUNTER — OFFICE VISIT (OUTPATIENT)
Dept: PULMONOLOGY | Age: 67
End: 2022-08-04
Payer: COMMERCIAL

## 2022-08-04 VITALS
HEART RATE: 61 BPM | BODY MASS INDEX: 49.52 KG/M2 | WEIGHT: 252.2 LBS | TEMPERATURE: 98.1 F | RESPIRATION RATE: 16 BRPM | OXYGEN SATURATION: 97 % | DIASTOLIC BLOOD PRESSURE: 78 MMHG | SYSTOLIC BLOOD PRESSURE: 145 MMHG | HEIGHT: 60 IN

## 2022-08-04 DIAGNOSIS — G47.33 OSA ON CPAP: Primary | ICD-10-CM

## 2022-08-04 DIAGNOSIS — I47.1 PAROXYSMAL ATRIAL TACHYCARDIA (HCC): ICD-10-CM

## 2022-08-04 DIAGNOSIS — Z99.89 OSA ON CPAP: Primary | ICD-10-CM

## 2022-08-04 DIAGNOSIS — E66.01 MORBID OBESITY (HCC): ICD-10-CM

## 2022-08-04 PROCEDURE — 1123F ACP DISCUSS/DSCN MKR DOCD: CPT | Performed by: INTERNAL MEDICINE

## 2022-08-04 PROCEDURE — 99213 OFFICE O/P EST LOW 20 MIN: CPT | Performed by: INTERNAL MEDICINE

## 2022-08-04 NOTE — PROGRESS NOTES
MA Communication:   The following orders are received by verbal communication from Angelito Vo MD    Orders include:  fu 1 yr sleep       CPAP sleep

## 2022-08-04 NOTE — PROGRESS NOTES
HISTORY OF PRESENT ILLNESS: Ingrid Moura is a 74y.o. year old female with a history of SVT and syncope who presents who presents for annual follow-up for ANNA. Her PCP is Dr. Marium Carey, cardiologists Jacob Moreno and Dr. Alli Mtz. Bharati Cedillo was last seen on 8/4/2021. She is on auto CPAP 5-15 cm H2O, her DME is [facility]. The patient has been compliant with BiPAP. She goes to bed between 10 and 10:30 PM, does have her TV on, but it is on her timer. She falls asleep within a few minutes. She has very deep sleep, occasionally wakes up to urinate if she has drunk more liquids in the daytime. She wakes up between 6 and 6:30 AM, is refreshed and does not take any daytime naps. She has had some eye dryness, is aware that her antihistamine and Vesicare. She is not comfortable with a nasal mask, wants to change back to a full facemask. She has been off caffeine, does eat chocolate occasionally. She has not gained any weight, but has not lost weight either. She has had decreased vision, has her grandson drive for her. She is due to undergo cataract surgery, but mentions the ophthalmologist does not feel she is ready for surgery yet. There was no other change in her medical or surgical history since her last visit, she has not been seen by cardiology recently. The rest of her ROS was negative. CPAP compliance 6/29 through 7/28/2022  Usage >4 hours 97%, average 8 hours and 15 minutes. 95th percentile pressure 13.2 cm H2O, maximum 14.1 cm H2O. AHI 0.6/h, insignificant central events. Previous notes reviewed and edited as necessary. Bharati Cedillo was last seen on 7/22/2020, returns for annual follow-up. She is on auto CPAP 5-15 cm H2O. She has a history of hypertension, diabetes mellitus, hyperlipidemia, PAF status post pacemaker placement. Recent labs showed A1c of 5.5, normal renal profile, ALT of 77, normal lipids.   The patient thinks she had COVID-19 infection in 2019, although it was not spend the night with her granddaughters, she may not use her CPAP. She volunteers in the school. She recently had a fall, but no other change in her medical history, medications. She has been seen by Zhou Arriaza in follow-up. CPAP compliance data 2/18 through 3/19/19  Uses 29/30 days, 97%. Average usage 7 hours, 31 minutes. On auto CPAP 5-20 cm H2O.  95th percentile pressure was 15.9, maximum 17.6 cm H2O. Leak was low, 95th percentile leak 7.7 LPM.  AHI was 0.9. Previous notes reviewed and edited as necessary. The patient had a sleep study on 12/18/17 which showed moderate ANNA, AHI 18 per hour, no obstruction saturation 90%. Her sleep efficiency was low at 30%. She saw Dr. Kentrell Basurto in follow-up on 12/21, was placed on AutoPap. She saw Dr. Maxx Huynh in follow-up on 2/23, no new recommendations were provided. The patient presents today to assess her CPAP tolerance and compliance. The patient says she struggled in the first few months to get used to the CPAP, was averaging only about 4 hours. She has gradually been tolerating this better. She has had a \"cold\" with stuffy nose and occasional rhinorrhea, and had an episode of \"laryngitis\" with complete loss of voice. She says she gets an episode every 7-8 years. This is followed by mild episode of bronchitis. She was prescribed Zyrtec, has not been using it. She also cleans her nares with a Neti pot. She often takes care of her grandchildren. She was given a treadmill as a gift and Jerson, will make attempts to lose weight. ANNA compliance data  Her EKG did not last 30 days was 100%, average 623 minutes. She is on AutoPap 5-20 cm water, 95th percentile was 14 cm water. Maximum leak was 18.1, AHI was 1.1. Previous notes reviewed and edited as necessary. The patient said she started with cardiac arrhythmia around 2004.   She had a racing heartbeat, wore a Holter monitor and was seen in [place] from one of the cardiologists at [facility].  She was diagnosed with SVT and placed on atenolol. This helped her blood pressure as well. As she gained weight, she has had more difficulty with blood pressure control. Over the last 10 years, she has gained almost 50 pounds. On 10/15/17, she left her home to get the newspaper, came back and was sitting and reading her newspaper when she had a syncopal episode. She estimates this to last about 30 seconds, she had no injury. She then woke up with some difficulty to go to the bedroom and measure her blood pressure. The cough kept giving in error signal repeatedly, and while she was sitting at the edge of her bed, she had another syncopal episode and got wedged between the bed and her dresser, sustaining an injury to her left cheekbone that has subsequently healed. She thinks she woke up in a few seconds. She called her daughter who is a nurse, and was driven to the ER at [place]. EKG showed pauses and she was transferred to [facility], was seen by Dr. Megha Vee, who referred her to Dr. Nicole Tipotn. She underwent an echocardiogram and routine blood work. She has had no further episodes of syncope, but was called by Probki Iz okna for a third episode on Saturday. She was called by Dr. Nicole Tipton, told that she had a 6 second pause and that she needed a pacemaker. This is scheduled for Monday. She denies any chest pain or dyspnea. She has no cough. She does have dizziness and lightheadedness, blurred vision and vague nausea and abdominal pain around the episodes of syncope. The patient is a , her   from bladder cancer. She had been told by him that she had loud snoring, she has woken herself up snoring. She has no snorting or choking, no PND or orthopnea. Occasionally she has sweating which she thinks are due to hot flashes, she has occasional nocturnal reflux.   She goes to bed around 11 PM, is playing games on her Delwyn Jean, occasionally watching television when she sleeps in a recliner as she has a water bed and has been told not to use it. When her  was alive, she slept in a recliner. When she worked, she was having shifted to waking up at 4:30 AM.  Now she wakes up between 4:30 and 5:30 AM, gets out of bed because she is looking forward to babysitting her grandchildren. Over the weekends, she wakes up, but then goes back to sleep and gets out of bed later around 7 or 7:30 AM.  She thinks she is not refreshed, but she is happy to look after her grandchildren, looks forward to the day. Once or twice a week, she falls asleep between 8:30 and 9 PM, then will wake up around 1:30 AM.  She tends to watch television between 12:30 and 2 in the afternoon, falls asleep watching TV once or twice a week. She is not sleepy during driving, but does feel tired in the daytime. She occasionally bites her tongue just at the time and she is about to fall asleep, then sleeps with her mouth guard. 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 Too Jorge, rheumatologist). She has a brother and 3 sisters, her sister may be developing Parkinson's disease. Her father  of Parkinson's disease, she is not sure what her mother  of, possibly MI. Her mother had non-Hodgkin's lymphoma. She has 2 children, healthy. The patient was a heavy smoker, from age [age], smoked 2-3 packs per day. She does not drink alcohol. She was  in 2013. 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 The patient's past medical, surgical, family and personal history were reviewed by me personally, changes made in EMR as needed. Echocardiogram 10/16/17  Rhythm appears to be atrial fibrillation. Left ventricular systolic function is normal with the ejection fraction estimated at 55%. No regional wall motion abnormalities. There is mild concentric left ventricular hypertrophy.  Left ventricular diastolic filling pressure is indeterminate secondary to arrhythmia. The right ventricle is mildly enlarged. Right atrial size is mildly dilated . No significant valvular heart disease. CTPA 10/17/17  1. No acute findings in the chest.  Specifically, no findings of pulmonary   embolism. 2. Mild cardiomegaly with mild right ventricular hypertrophy and mild   concentric left ventricular hypertrophy. 3. A few incidental findings above for which no follow-up is recommended. Labs 10/16/17  CBC and renal profile normal. In 2013 she has had a positive SOPHY with titers 1:160, RF wa    PAST MEDICAL HISTORY:  Past Medical History:   Diagnosis Date    HTN     SVT (supraventricular tachycardia) (HCC)     Wrist fracture, closed, left, with routine healing, subsequent encounter     Compound       PAST SURGICAL HISTORY:  Past Surgical History:   Procedure Laterality Date    BREAST BIOPSY Right 2002    CYST REMOVAL Right        FAMILY HISTORY:  family history includes Hypertension in her father; Parkinsonism in her father. SOCIAL HISTORY:   reports that she quit smoking about 36 years ago. Her smoking use included cigarettes. She started smoking about 47 years ago. She has a 30.00 pack-year smoking history. She has never used smokeless tobacco.      ALLERGIES:  Patient is allergic to chlorthalidone.     REVIEW OF SYSTEMS:  Constitutional: Negative for fever, stable weight  HENT: Negative for sore throat, mouth dryness  Eyes: Negative for redness, reduced vision, dryness of eyes  Respiratory: Negative for dyspnea, cough  Cardiovascular: Negative for chest pain  Gastrointestinal: Negative for vomiting, diarrhea   Genitourinary: Negative for hematuria   Musculoskeletal: Positive for arthralgias   Skin: Negative for rash  Neurological: Positive for syncope  Hematological: Negative for adenopathy  Psychiatric/Behavorial: Negative for anxiety    Objective:   PHYSICAL EXAM:  Blood pressure (!) 144/84, pulse 61, temperature 98.1 °F (36.7 10:41 AM    CREATININE 0.8 02/07/2022 10:41 AM    GLUCOSE 97 02/07/2022 10:41 AM    CALCIUM 9.5 02/07/2022 10:41 AM     Chest imaging reports were reviewed and imaging was reviewed by me    Assessment:     ANNA, EDS  Poor sleep hygiene  Syncope  Obesity      Plan:      1. Obstructive sleep apnea, excessive daytime sleepiness  Doing extremely well on AutoPap 5-15 cm H2O, extremely good compliance and very good AHI. She is cleaning the humidifier, mask and hoses as recommended. She is well acclimated. She wishes to go back to a full facemask, prescription placed. 2.  Allergic rhinitis  Symptoms are not bothersome at present    3. Poor sleep hygiene  Ideally should not watch TV close to bedtime, but she has it on a timer and falls asleep easily    4. Syncope, unspecified syncope type, SVT  Followed by cardiology. No acute problems    5. Class 3 obesity due to excess calories without serious comorbidity with body mass index (BMI) of 45.0 to 49.9 in adult [facility])  Should make attempts to lose weight with caloric restriction and exercise. She will had mentioned she would use her treadmill trying to lose weight. RTC one year, call if problems.

## 2022-08-04 NOTE — PATIENT INSTRUCTIONS
Remember to bring a list of pulmonary medications and any CPAP or BiPAP machines to your next appointment with the office. Please keep all of your future appointments scheduled by [facility] office. Out of respect for other patients and providers, you may be asked to reschedule your appointment if you arrive later than your scheduled appointment time. Appointments cancelled less than 24hrs in advance will be considered a no show. Patients with three missed appointments within 1 year or four missed appointments within 2 years can be dismissed from the practice. Please be aware that our physicians are required to work in the Intensive Care Unit at [facility].  Your appointment may need to be rescheduled if they are designated to work during your appointment time. You may receive a survey regarding the care you received during your visit. Your input is valuable to us. We encourage you to complete and return your survey. We hope you will choose us in the future for your healthcare needs. Pt instructed of all future appointment dates & times, including radiology, labs, procedures & referrals. If procedures were scheduled preparation instructions provided. Instructions on future appointments with [facility] were given.

## 2022-08-08 ENCOUNTER — OFFICE VISIT (OUTPATIENT)
Dept: FAMILY MEDICINE CLINIC | Age: 67
End: 2022-08-08
Payer: COMMERCIAL

## 2022-08-08 VITALS
OXYGEN SATURATION: 98 % | BODY MASS INDEX: 48.82 KG/M2 | DIASTOLIC BLOOD PRESSURE: 67 MMHG | SYSTOLIC BLOOD PRESSURE: 139 MMHG | WEIGHT: 250 LBS | HEART RATE: 60 BPM

## 2022-08-08 DIAGNOSIS — G47.33 OSA ON CPAP: ICD-10-CM

## 2022-08-08 DIAGNOSIS — R73.9 HYPERGLYCEMIA: ICD-10-CM

## 2022-08-08 DIAGNOSIS — Z99.89 OSA ON CPAP: ICD-10-CM

## 2022-08-08 DIAGNOSIS — I47.1 PAROXYSMAL ATRIAL TACHYCARDIA (HCC): ICD-10-CM

## 2022-08-08 DIAGNOSIS — E78.00 HYPERCHOLESTEREMIA: ICD-10-CM

## 2022-08-08 DIAGNOSIS — I10 ESSENTIAL HYPERTENSION: Primary | ICD-10-CM

## 2022-08-08 LAB
ALT SERPL-CCNC: 33 U/L (ref 10–40)
ANION GAP SERPL CALCULATED.3IONS-SCNC: 14 MMOL/L (ref 3–16)
BUN BLDV-MCNC: 14 MG/DL (ref 7–20)
CALCIUM SERPL-MCNC: 9.6 MG/DL (ref 8.3–10.6)
CHLORIDE BLD-SCNC: 106 MMOL/L (ref 99–110)
CHOLESTEROL, TOTAL: 190 MG/DL (ref 0–199)
CO2: 24 MMOL/L (ref 21–32)
CREAT SERPL-MCNC: 1.1 MG/DL (ref 0.6–1.2)
GFR AFRICAN AMERICAN: >60
GFR NON-AFRICAN AMERICAN: 50
GLUCOSE BLD-MCNC: 89 MG/DL (ref 70–99)
HDLC SERPL-MCNC: 49 MG/DL (ref 40–60)
LDL CHOLESTEROL CALCULATED: 118 MG/DL
POTASSIUM SERPL-SCNC: 4.2 MMOL/L (ref 3.5–5.1)
SODIUM BLD-SCNC: 144 MMOL/L (ref 136–145)
TRIGL SERPL-MCNC: 116 MG/DL (ref 0–150)
VLDLC SERPL CALC-MCNC: 23 MG/DL

## 2022-08-08 PROCEDURE — 1123F ACP DISCUSS/DSCN MKR DOCD: CPT | Performed by: FAMILY MEDICINE

## 2022-08-08 PROCEDURE — 90677 PCV20 VACCINE IM: CPT | Performed by: FAMILY MEDICINE

## 2022-08-08 PROCEDURE — 90471 IMMUNIZATION ADMIN: CPT | Performed by: FAMILY MEDICINE

## 2022-08-08 PROCEDURE — 36415 COLL VENOUS BLD VENIPUNCTURE: CPT | Performed by: FAMILY MEDICINE

## 2022-08-08 PROCEDURE — 99214 OFFICE O/P EST MOD 30 MIN: CPT | Performed by: FAMILY MEDICINE

## 2022-08-08 NOTE — PROGRESS NOTES
Subjective:  Duane Victoria is here to discuss the following issues. She has essential hypertension and takes a combination of medicines and blood pressures have been well controlled. She has a history of PAT but on atenolol has had no symptomatic rapid heart rate. Resting heart rates are typically in the 60s or 70s. He has obstructive sleep apnea and she states that CPAP is extremely beneficial and well-tolerated. She has hyperglycemia with no polydipsia or polyuria. She has elevated cholesterol continues on Lipitor and her weight is stable  Social History     Tobacco Use   Smoking Status Former    Packs/day: 3.00    Years: 10.00    Pack years: 30.00    Types: Cigarettes    Start date: 6/3/1975    Quit date: 3/2/1986    Years since quittin.4   Smokeless Tobacco Never   Allergies:     Chlorthalidone    Objective:  /67   Pulse 60   Wt 250 lb (113.4 kg)   SpO2 98%   BMI 48.82 kg/m²    No acute distress, heart regular rate and rhythm without murmur, lungs clear to auscultation easy effort, abdomen soft nondistended, no clubbing or cyanosis    Assessment:  1. Essential hypertension    2. Paroxysmal atrial tachycardia ([address]    3. ANNA on CPAP    4. Hyperglycemia    5. Hypercholesteremia            Plan:  Labs ordered  Prevnar 20  Continue current medicines  She has a 13year-old grandson and she is helping him learn to drive  Her daughter Melisa Ortiz and her son Ifrah Murguia often have her babysitting her grandchildren  She has grandchildren in second third [de-identified] and sixth grade  Follow-up in 6 months fasting or as needed  The diagnoses listed in the assessment above are stable unless otherwise indicated. Age-specific preventative health recommendations were reviewed and the [facility]" was provided. Avoid exposure to tobacco products. Follow CDC guidelines for covid-19 prevention.   Read and consider all information provided by the pharmacy regarding prescribed medications before use    Call or return for any problems that arise before the next scheduled appointment. Abdirashid Olson MD    This note was transcribed using a voice recognition software system. Proper technique and careful oversight were used to increase transcription accuracy but inadvertent errors may be present.

## 2022-08-08 NOTE — PATIENT INSTRUCTIONS
Please read the healthy family handout that you were given and share it with your family. Please compare this printed medication list with your medications at home to be sure they are the same. If you have any medications that are different please contact us immediately at [phone]. Also review your allergies that we have listed, these may also include medications that you have not been able to tolerate, make sure everything listed is correct. If you have any allergies that are different please contact us immediately at [phone]. You may receive a survey in the mail or by email asking about your experience during your visit today. Please complete and return to us so we know how we are serving you.

## 2022-08-09 PROBLEM — N28.9 RENAL INSUFFICIENCY: Status: ACTIVE | Noted: 2022-08-09

## 2022-08-09 LAB
ESTIMATED AVERAGE GLUCOSE: 108.3 MG/DL
HBA1C MFR BLD: 5.4 %

## 2022-08-09 RX ORDER — MELOXICAM 7.5 MG/1
TABLET ORAL
Qty: 30 TABLET | Refills: 5 | Status: SHIPPED | OUTPATIENT
Start: 2022-08-09

## 2022-09-06 ENCOUNTER — NURSE ONLY (OUTPATIENT)
Dept: FAMILY MEDICINE CLINIC | Age: 67
End: 2022-09-06
Payer: COMMERCIAL

## 2022-09-06 DIAGNOSIS — N28.9 ABNORMAL KIDNEY FUNCTION: Primary | ICD-10-CM

## 2022-09-06 LAB
ANION GAP SERPL CALCULATED.3IONS-SCNC: 14 MMOL/L (ref 3–16)
BUN BLDV-MCNC: 13 MG/DL (ref 7–20)
CALCIUM SERPL-MCNC: 9.2 MG/DL (ref 8.3–10.6)
CHLORIDE BLD-SCNC: 105 MMOL/L (ref 99–110)
CO2: 24 MMOL/L (ref 21–32)
CREAT SERPL-MCNC: 1 MG/DL (ref 0.6–1.2)
GFR AFRICAN AMERICAN: >60
GFR NON-AFRICAN AMERICAN: 56
GLUCOSE BLD-MCNC: 117 MG/DL (ref 70–99)
POTASSIUM SERPL-SCNC: 3.9 MMOL/L (ref 3.5–5.1)
SODIUM BLD-SCNC: 143 MMOL/L (ref 136–145)

## 2022-09-06 PROCEDURE — 36415 COLL VENOUS BLD VENIPUNCTURE: CPT | Performed by: FAMILY MEDICINE

## 2022-10-10 RX ORDER — TERAZOSIN 5 MG/1
CAPSULE ORAL
Qty: 30 CAPSULE | Refills: 1 | Status: SHIPPED | OUTPATIENT
Start: 2022-10-10

## 2022-10-10 NOTE — TELEPHONE ENCOUNTER
Future appt scheduled 02/14/2023                 Last appt 08/08/2022      Last Written 07/08/2022    terazosin (HYTRIN) 5 MG capsule  #30  1 RF

## 2022-10-24 ENCOUNTER — OFFICE VISIT (OUTPATIENT)
Dept: CARDIOLOGY CLINIC | Age: 67
End: 2022-10-24
Payer: COMMERCIAL

## 2022-10-24 ENCOUNTER — NURSE ONLY (OUTPATIENT)
Dept: CARDIOLOGY CLINIC | Age: 67
End: 2022-10-24
Payer: COMMERCIAL

## 2022-10-24 VITALS
BODY MASS INDEX: 47.27 KG/M2 | HEART RATE: 60 BPM | DIASTOLIC BLOOD PRESSURE: 96 MMHG | HEIGHT: 61 IN | WEIGHT: 250.38 LBS | SYSTOLIC BLOOD PRESSURE: 134 MMHG | OXYGEN SATURATION: 96 %

## 2022-10-24 DIAGNOSIS — I47.1 PAROXYSMAL ATRIAL TACHYCARDIA (HCC): Primary | ICD-10-CM

## 2022-10-24 DIAGNOSIS — I10 ESSENTIAL HYPERTENSION: ICD-10-CM

## 2022-10-24 DIAGNOSIS — I47.1 PAROXYSMAL ATRIAL TACHYCARDIA (HCC): ICD-10-CM

## 2022-10-24 DIAGNOSIS — Z95.0 PACEMAKER: ICD-10-CM

## 2022-10-24 DIAGNOSIS — I49.5 SINUS NODE DYSFUNCTION (HCC): ICD-10-CM

## 2022-10-24 DIAGNOSIS — I49.5 SINUS NODE DYSFUNCTION (HCC): Primary | ICD-10-CM

## 2022-10-24 PROCEDURE — 99214 OFFICE O/P EST MOD 30 MIN: CPT | Performed by: NURSE PRACTITIONER

## 2022-10-24 PROCEDURE — 1123F ACP DISCUSS/DSCN MKR DOCD: CPT | Performed by: NURSE PRACTITIONER

## 2022-10-24 PROCEDURE — 93280 PM DEVICE PROGR EVAL DUAL: CPT | Performed by: INTERNAL MEDICINE

## 2022-10-24 NOTE — PROGRESS NOTES
[address]   Electrophysiology Outpatient Note              Date:  October 24, 2022  Patient name: Abhilash Wan  YOB: 1955    Primary Care physician: James Billingsley MD    HISTORY OF PRESENT ILLNESS: The patient is a 74 y.o.  female with a history of sinus node dysfunction, syncope, PAT, HTN and ANNA. In 10/2017, she was admitted to the hospital for syncope. Echo showed an EF of 55%. She was discharged with 30-day event monitor that showed multiple pauses. She had a 6.4-second pause with syncope. In 11/2017, patient underwent implantation of a dual-chamber pacemaker. PAT was intact during her hospital stay and she was started on atenolol. Device check today showed normal device function, no arrhythmias, AP 91.4%, and  69.3% (although 100% more recently). Today she is being seen for sinus node dysfunction and PAT. EKG shows AP  with a HR of 93. She has no complaints. Denies chest pain, palpitations, shortness of breath or dizziness. She checks her blood pressure at home. Average systolic blood pressure is 130-140 mmHG and average diastolic blood pressure is 76-80 mmHG        Past Medical History:   has a past medical history of HTN, SVT (supraventricular tachycardia) ([address] and Wrist fracture, closed, left, with routine healing, subsequent encounter. Past Surgical History:   has a past surgical history that includes Breast biopsy (Right, 2002) and cyst removal (Right). Home Medications:    Prior to Admission medications    Medication Sig Start Date End Date Taking?  Authorizing Provider   terazosin (HYTRIN) 5 MG capsule TAKE 1 CAPSULE NIGHTLY 10/10/22  Yes James Billingsley MD   meloxicam (MOBIC) 7.5 MG tablet qd 8/9/22  Yes James Billingsley MD   lisinopril (PRINIVIL;ZESTRIL) 40 MG tablet TAKE (1) TABLET DAILY 6/8/22  Yes James Billingsley MD   atenolol (TENORMIN) 50 MG tablet TAKE 2 TABLETS DAILY 6/8/22  Yes James Billingsley MD   solifenacin (Maryjean Card) 5 MG tablet TAKE 1 TABLET BY MOUTH DAILY 6/8/22  Yes Danuta Lainez MD   atorvastatin (LIPITOR) 40 MG tablet TAKE (1) TABLET DAILY 6/8/22  Yes Danuta Lainez MD   aspirin 81 MG EC tablet Take 81 mg by mouth daily. Yes Historical Provider, MD       Allergies:  Chlorthalidone    Social History:   reports that she quit smoking about 36 years ago. Her smoking use included cigarettes. She started smoking about 47 years ago. She has a 30.00 pack-year smoking history. She has never used smokeless tobacco. She reports current alcohol use. She reports that she does not use drugs. Family History: family history includes Hypertension in her father; Parkinsonism in her father. All 14 point review of systems are completed and pertinent positives are mentioned in the history of present illness. Other systems are reviewed and are negative. PHYSICAL EXAM:    Vital signs:    BP (!) 134/96   Pulse 60   Ht 5' 1\" (1.549 m)   Wt 250 lb 6 oz (113.6 kg)   SpO2 96%   BMI 47.31 kg/m²      Constitutional and general appearance: alert, cooperative, no distress, and appears stated age  HEENT: PERRL, no cervical lymphadenopathy. No masses palpable. Normal oral mucosa  Respiratory:  Normal excursion and expansion without use of accessory muscles  Resp auscultation: Normal breath sounds without wheezing, rhonchi, and rales  Cardiovascular:   The apical impulse is not displaced  Heart tones are crisp and normal. regular S1 and S2.  Jugular venous pulsation Normal  The carotid upstroke is normal in amplitude and contour without delay or bruit  Peripheral pulses are symmetrical and full   Abdomen:  No masses or tenderness  Bowel sounds present  Extremities:   No cyanosis or clubbing   No lower extremity edema   Skin: warm and dry  Neurological:  Alert and oriented  Moves all extremities well  No abnormalities of mood, affect, memory, mentation, or behavior are noted    DATA:    ECG 10/24/2022:  AP  93 bpm     Echo 10/16/2017:  Rhythm appears to be atrial fibrillations. Left ventricular systolic function is normal with the ejection fraction estimated at 55%. No regional wall motion abnormalities. There is mild concentric left ventricular hypertrophy. Left ventricular diastolic filling pressure is indeterminate secondary to arrhythmia. The right ventricle is mildly enlarged. Right atrial size is mildly dilated. No significant valvular heart disease. All labs and testing reviewed. CARDIOLOGY LABS:   CBC: No results for input(s): WBC, HGB, HCT, PLT in the last 72 hours. BMP: No results for input(s): NA, K, CO2, BUN, CREATININE, LABGLOM, GLUCOSE in the last 72 hours. PT/INR: No results for input(s): PROTIME, INR in the last 72 hours. APTT:No results for input(s): APTT in the last 72 hours. FASTING LIPID PANEL:  Lab Results   Component Value Date/Time    HDL 49 08/08/2022 08:35 AM    HDL 54 01/05/2012 08:52 AM    LDLCALC 118 08/08/2022 08:35 AM    TRIG 116 08/08/2022 08:35 AM     LIVER PROFILE:No results for input(s): AST, ALT, ALB in the last 72 hours. IMPRESSION:    Patient Active Problem List   Diagnosis    Hyperglycemia    Dermatitis    Essential hypertension    Hypercholesteremia    Postmenopausal disorder    Sinus node dysfunction (HCC)    Pacemaker    Paroxysmal atrial tachycardia (HCC)    Arthritis    ANNA on CPAP    Renal insufficiency       Assessment:   Sinus node dysfunction: stable    -s/p dual chamber pacemaker implant 11/2017   -device check per HPI  PAT: stable   HTN: suboptimal   HLD   ANNA   Arthritis       Plan:   1. Continue atenolol, terazosin, and lisinopril   2. Remote device transmissions every three months   3. Monitor BP at home and call if consistently out of goal ranges  4.  Follow up in one year or sooner if needed       SU Reynolds, APRONDINA-CNP  [address]  [phone]

## 2022-10-24 NOTE — PATIENT INSTRUCTIONS
No changes today     Remote device transmissions every three months     Monitor BP at home and call if consistently out of goal ranges    Follow up in one year or sooner if needed

## 2022-11-11 ENCOUNTER — NURSE ONLY (OUTPATIENT)
Dept: FAMILY MEDICINE CLINIC | Age: 67
End: 2022-11-11
Payer: COMMERCIAL

## 2022-11-11 DIAGNOSIS — Z23 NEED FOR INFLUENZA VACCINATION: Primary | ICD-10-CM

## 2022-11-11 PROCEDURE — 90471 IMMUNIZATION ADMIN: CPT | Performed by: FAMILY MEDICINE

## 2022-11-11 PROCEDURE — 90694 VACC AIIV4 NO PRSRV 0.5ML IM: CPT | Performed by: FAMILY MEDICINE

## 2022-12-05 RX ORDER — LISINOPRIL 40 MG/1
TABLET ORAL
Qty: 30 TABLET | Refills: 5 | Status: SHIPPED | OUTPATIENT
Start: 2022-12-05

## 2022-12-05 RX ORDER — TERAZOSIN 5 MG/1
CAPSULE ORAL
Qty: 30 CAPSULE | Refills: 1 | Status: SHIPPED | OUTPATIENT
Start: 2022-12-05

## 2022-12-06 ENCOUNTER — NURSE ONLY (OUTPATIENT)
Dept: CARDIOLOGY CLINIC | Age: 67
End: 2022-12-06

## 2022-12-06 DIAGNOSIS — I49.5 SINUS NODE DYSFUNCTION (HCC): Primary | ICD-10-CM

## 2022-12-06 DIAGNOSIS — Z95.0 PACEMAKER: ICD-10-CM

## 2022-12-11 NOTE — PROGRESS NOTES
End of 91-day monitoring period 12/6. Pacing (% of Time Since 24-Oct-2022)  Total  99.0% (MVP On)   No  arrhythmias recorded. Remote transmission received for patient's dual chamber PACEMAKER. Transmission shows normal sensing and pacing function. EP physician will review. See interrogation under the cardiology tab in the [address] [place] [address] [address] field for more details. Will continue to monitor remotely.

## 2022-12-27 RX ORDER — SOLIFENACIN SUCCINATE 5 MG/1
TABLET, FILM COATED ORAL
Qty: 30 TABLET | Refills: 5 | Status: SHIPPED | OUTPATIENT
Start: 2022-12-27

## 2022-12-27 RX ORDER — ATENOLOL 50 MG/1
TABLET ORAL
Qty: 60 TABLET | Refills: 5 | Status: SHIPPED | OUTPATIENT
Start: 2022-12-27

## 2022-12-27 NOTE — TELEPHONE ENCOUNTER
Future appt scheduled 02/14/2023                     Last appt 08/08/2022      Last Written     atenolol (TENORMIN) 50 MG tablet  06/08/2022  #60  5 RF     solifenacin (VESICARE) 5 MG tablet  06/08/2022  #30  5 RF

## 2023-01-06 RX ORDER — ATORVASTATIN CALCIUM 40 MG/1
TABLET, FILM COATED ORAL
Qty: 30 TABLET | Refills: 5 | Status: SHIPPED | OUTPATIENT
Start: 2023-01-06

## 2023-02-06 RX ORDER — TERAZOSIN 5 MG/1
CAPSULE ORAL
Qty: 30 CAPSULE | Refills: 1 | Status: SHIPPED | OUTPATIENT
Start: 2023-02-06

## 2023-02-14 ENCOUNTER — OFFICE VISIT (OUTPATIENT)
Dept: FAMILY MEDICINE CLINIC | Age: 68
End: 2023-02-14
Payer: COMMERCIAL

## 2023-02-14 VITALS
SYSTOLIC BLOOD PRESSURE: 156 MMHG | HEART RATE: 60 BPM | TEMPERATURE: 98.9 F | WEIGHT: 244 LBS | BODY MASS INDEX: 46.1 KG/M2 | OXYGEN SATURATION: 96 % | DIASTOLIC BLOOD PRESSURE: 71 MMHG

## 2023-02-14 DIAGNOSIS — R73.9 HYPERGLYCEMIA: ICD-10-CM

## 2023-02-14 DIAGNOSIS — I10 ESSENTIAL HYPERTENSION: ICD-10-CM

## 2023-02-14 DIAGNOSIS — E78.00 HYPERCHOLESTEREMIA: ICD-10-CM

## 2023-02-14 DIAGNOSIS — R30.0 DYSURIA: Primary | ICD-10-CM

## 2023-02-14 DIAGNOSIS — R10.84 GENERALIZED ABDOMINAL PAIN: ICD-10-CM

## 2023-02-14 DIAGNOSIS — L30.9 DERMATITIS: ICD-10-CM

## 2023-02-14 DIAGNOSIS — Z95.0 PACEMAKER: ICD-10-CM

## 2023-02-14 LAB
ALT SERPL-CCNC: 35 U/L (ref 10–40)
ANION GAP SERPL CALCULATED.3IONS-SCNC: 13 MMOL/L (ref 3–16)
BASOPHILS ABSOLUTE: 0.1 K/UL (ref 0–0.2)
BASOPHILS RELATIVE PERCENT: 1.2 %
BILIRUBIN, POC: NORMAL
BLOOD URINE, POC: NORMAL
BUN BLDV-MCNC: 14 MG/DL (ref 7–20)
CALCIUM SERPL-MCNC: 9.4 MG/DL (ref 8.3–10.6)
CHLORIDE BLD-SCNC: 109 MMOL/L (ref 99–110)
CHOLESTEROL, TOTAL: 167 MG/DL (ref 0–199)
CLARITY, POC: NORMAL
CO2: 22 MMOL/L (ref 21–32)
COLOR, POC: YELLOW
CREAT SERPL-MCNC: 0.8 MG/DL (ref 0.6–1.2)
EOSINOPHILS ABSOLUTE: 0.1 K/UL (ref 0–0.6)
EOSINOPHILS RELATIVE PERCENT: 1.3 %
GFR SERPL CREATININE-BSD FRML MDRD: >60 ML/MIN/{1.73_M2}
GLUCOSE BLD-MCNC: 117 MG/DL (ref 70–99)
GLUCOSE URINE, POC: NORMAL
HCT VFR BLD CALC: 41.1 % (ref 36–48)
HDLC SERPL-MCNC: 53 MG/DL (ref 40–60)
HEMOGLOBIN: 13.9 G/DL (ref 12–16)
KETONES, POC: NORMAL
LDL CHOLESTEROL CALCULATED: 97 MG/DL
LEUKOCYTE EST, POC: NORMAL
LYMPHOCYTES ABSOLUTE: 1.7 K/UL (ref 1–5.1)
LYMPHOCYTES RELATIVE PERCENT: 27.8 %
MCH RBC QN AUTO: 30.2 PG (ref 26–34)
MCHC RBC AUTO-ENTMCNC: 33.7 G/DL (ref 31–36)
MCV RBC AUTO: 89.7 FL (ref 80–100)
MONOCYTES ABSOLUTE: 0.3 K/UL (ref 0–1.3)
MONOCYTES RELATIVE PERCENT: 4.7 %
NEUTROPHILS ABSOLUTE: 4.1 K/UL (ref 1.7–7.7)
NEUTROPHILS RELATIVE PERCENT: 65 %
NITRITE, POC: NORMAL
PDW BLD-RTO: 14.3 % (ref 12.4–15.4)
PH, POC: 6.5
PLATELET # BLD: 336 K/UL (ref 135–450)
PMV BLD AUTO: 8.8 FL (ref 5–10.5)
POTASSIUM SERPL-SCNC: 4.3 MMOL/L (ref 3.5–5.1)
PROTEIN, POC: NORMAL
RBC # BLD: 4.59 M/UL (ref 4–5.2)
SODIUM BLD-SCNC: 144 MMOL/L (ref 136–145)
SPECIFIC GRAVITY, POC: >1.03
TRIGL SERPL-MCNC: 84 MG/DL (ref 0–150)
TSH REFLEX: 2.83 UIU/ML (ref 0.27–4.2)
UROBILINOGEN, POC: 0.2
VLDLC SERPL CALC-MCNC: 17 MG/DL
WBC # BLD: 6.3 K/UL (ref 4–11)

## 2023-02-14 PROCEDURE — 3077F SYST BP >= 140 MM HG: CPT | Performed by: FAMILY MEDICINE

## 2023-02-14 PROCEDURE — 1123F ACP DISCUSS/DSCN MKR DOCD: CPT | Performed by: FAMILY MEDICINE

## 2023-02-14 PROCEDURE — 99214 OFFICE O/P EST MOD 30 MIN: CPT | Performed by: FAMILY MEDICINE

## 2023-02-14 PROCEDURE — 3078F DIAST BP <80 MM HG: CPT | Performed by: FAMILY MEDICINE

## 2023-02-14 PROCEDURE — 81002 URINALYSIS NONAUTO W/O SCOPE: CPT | Performed by: FAMILY MEDICINE

## 2023-02-14 RX ORDER — MELOXICAM 15 MG/1
TABLET ORAL
COMMUNITY
Start: 2023-02-04 | End: 2023-02-14 | Stop reason: SDUPTHER

## 2023-02-14 RX ORDER — BETAMETHASONE DIPROPIONATE 0.5 MG/G
CREAM TOPICAL
Qty: 30 G | Refills: 5 | Status: SHIPPED | OUTPATIENT
Start: 2023-02-14

## 2023-02-14 RX ORDER — SULFAMETHOXAZOLE AND TRIMETHOPRIM 800; 160 MG/1; MG/1
1 TABLET ORAL 2 TIMES DAILY
Qty: 20 TABLET | Refills: 0 | Status: SHIPPED | OUTPATIENT
Start: 2023-02-14 | End: 2023-02-24

## 2023-02-14 ASSESSMENT — PATIENT HEALTH QUESTIONNAIRE - PHQ9
1. LITTLE INTEREST OR PLEASURE IN DOING THINGS: 1
SUM OF ALL RESPONSES TO PHQ QUESTIONS 1-9: 2
SUM OF ALL RESPONSES TO PHQ QUESTIONS 1-9: 2
SUM OF ALL RESPONSES TO PHQ9 QUESTIONS 1 & 2: 2
2. FEELING DOWN, DEPRESSED OR HOPELESS: 1
SUM OF ALL RESPONSES TO PHQ QUESTIONS 1-9: 2
SUM OF ALL RESPONSES TO PHQ QUESTIONS 1-9: 2

## 2023-02-14 NOTE — PROGRESS NOTES
Subjective:  Brenda Mcleod is here to discuss the following issues.  She is having some dysuria and generalized abdominal pain with no nausea vomiting diarrhea constipation.  She has a history UTI.  She has hypertension and blood pressure is typically well controlled.  She has elevated cholesterol and is fasting for blood work and tries to follow an appropriate diet and her weight is stable.  She has a history of hyperglycemia with no polydipsia or polyuria.  She has chronic dermatitis and requests a refill on topical treatment.  She has a pacemaker and had recent follow-up with her EP doctor and no changes were initiated  Social History     Tobacco Use   Smoking Status Former    Packs/day: 3.00    Years: 10.00    Pack years: 30.00    Types: Cigarettes    Start date: 6/3/1975    Quit date: 3/2/1986    Years since quittin.9   Smokeless Tobacco Never   Allergies:     Chlorthalidone    Objective:  BP (!) 156/71   Pulse 60   Temp 98.9 °F (37.2 °C) (Oral)   Wt 244 lb (110.7 kg)   SpO2 96%   BMI 46.10 kg/m²    No acute distress, heart regular rate and rhythm without murmur, lungs clear to auscultation easy effort, abdomen nondistended, no clubbing or cyanosis    Assessment:  1. Dysuria    2. Generalized abdominal pain    3. Essential hypertension    4. Hypercholesteremia    5. Hyperglycemia    6. Dermatitis    7. Pacemaker            Plan:  Urinalysis and culture  Blood work ordered  Bactrim  Continue other medicines  Her daughter Iman is going through a bad divorce.  She has 2 daughters.  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   Age-specific preventative health recommendations were reviewed and a \"Healthy Family Handout\" has been provided.  Avoid exposure to tobacco products.  Follow COVID-19 CDC guidelines.  Read and consider all information provided by the pharmacy regarding  prescribed medications before use. Call or return for any problems that arise before the next scheduled appointment. Meng Mcnamara MD    This note was transcribed using a voice recognition software system. Proper technique and careful oversight were used to increase transcription accuracy but inadvertent errors may be present.

## 2023-02-14 NOTE — PATIENT INSTRUCTIONS
Please read the healthy family handout that you were given and share it with your family. Please compare this printed medication list with your medications at home to be sure they are the same. If you have any medications that are different please contact us immediately at [phone]. Also review your allergies that we have listed, these may also include medications that you have not been able to tolerate, make sure everything listed is correct. If you have any allergies that are different please contact us immediately at [phone]. You may receive a survey in the mail or by email asking about your experience during your visit today. Please complete and return to us so we know how we are serving you.

## 2023-02-15 LAB
ESTIMATED AVERAGE GLUCOSE: 108.3 MG/DL
HBA1C MFR BLD: 5.4 %
URINE CULTURE, ROUTINE: NORMAL

## 2023-03-07 ENCOUNTER — NURSE ONLY (OUTPATIENT)
Dept: CARDIOLOGY CLINIC | Age: 68
End: 2023-03-07
Payer: COMMERCIAL

## 2023-03-07 DIAGNOSIS — Z95.0 PACEMAKER: ICD-10-CM

## 2023-03-07 DIAGNOSIS — I49.5 SINUS NODE DYSFUNCTION (HCC): Primary | ICD-10-CM

## 2023-03-07 PROCEDURE — 93296 REM INTERROG EVL PM/IDS: CPT | Performed by: INTERNAL MEDICINE

## 2023-03-07 PROCEDURE — 93294 REM INTERROG EVL PM/LDLS PM: CPT | Performed by: INTERNAL MEDICINE

## 2023-03-08 RX ORDER — TERAZOSIN 5 MG/1
CAPSULE ORAL
Qty: 30 CAPSULE | Refills: 5 | Status: SHIPPED | OUTPATIENT
Start: 2023-03-08

## 2023-03-15 NOTE — PROGRESS NOTES
End of 91-day monitoring period 3/7/23  Pacing (% of Time Since 06-Dec-2022)  Total  99.2% (MVP On)   No  arrhythmias recorded. Remote transmission received for patient's dual chamber PACEMAKER. Transmission shows normal sensing and pacing function. EP physician will review. See interrogation under the cardiology tab in the [address] [place] [address] [address] field for more details. Will continue to monitor remotely.

## 2023-05-05 ENCOUNTER — HOSPITAL ENCOUNTER (OUTPATIENT)
Dept: WOMENS IMAGING | Age: 68
Discharge: HOME | End: 2023-05-05
Payer: MEDICARE

## 2023-05-05 VITALS — WEIGHT: 262 LBS | HEIGHT: 61 IN | BODY MASS INDEX: 49.47 KG/M2

## 2023-05-05 DIAGNOSIS — Z12.31 VISIT FOR SCREENING MAMMOGRAM: ICD-10-CM

## 2023-05-05 PROCEDURE — 77063 BREAST TOMOSYNTHESIS BI: CPT

## 2023-05-24 ENCOUNTER — COMMUNITY OUTREACH (OUTPATIENT)
Dept: FAMILY MEDICINE CLINIC | Age: 68
End: 2023-05-24

## 2023-06-08 RX ORDER — LISINOPRIL 40 MG/1
TABLET ORAL
Qty: 30 TABLET | Refills: 2 | Status: SHIPPED | OUTPATIENT
Start: 2023-06-08

## 2023-06-08 RX ORDER — MELOXICAM 15 MG/1
TABLET ORAL
Qty: 30 TABLET | Refills: 5 | OUTPATIENT
Start: 2023-06-08

## 2023-06-08 RX ORDER — MELOXICAM 7.5 MG/1
TABLET ORAL
Qty: 30 TABLET | Refills: 2 | Status: SHIPPED | OUTPATIENT
Start: 2023-06-08

## 2023-06-23 RX ORDER — ATENOLOL 50 MG/1
TABLET ORAL
Qty: 60 TABLET | Refills: 5 | Status: SHIPPED | OUTPATIENT
Start: 2023-06-23

## 2023-06-23 RX ORDER — SOLIFENACIN SUCCINATE 5 MG/1
TABLET, FILM COATED ORAL
Qty: 30 TABLET | Refills: 5 | Status: SHIPPED | OUTPATIENT
Start: 2023-06-23

## 2023-07-06 RX ORDER — ATORVASTATIN CALCIUM 40 MG/1
TABLET, FILM COATED ORAL
Qty: 30 TABLET | Refills: 5 | Status: SHIPPED | OUTPATIENT
Start: 2023-07-06

## 2023-08-29 ENCOUNTER — OFFICE VISIT (OUTPATIENT)
Dept: PULMONOLOGY | Age: 68
End: 2023-08-29
Payer: MEDICARE

## 2023-08-29 VITALS
SYSTOLIC BLOOD PRESSURE: 154 MMHG | OXYGEN SATURATION: 98 % | HEART RATE: 60 BPM | BODY MASS INDEX: 44.78 KG/M2 | DIASTOLIC BLOOD PRESSURE: 85 MMHG | TEMPERATURE: 97 F | HEIGHT: 61 IN | WEIGHT: 237.2 LBS | RESPIRATION RATE: 16 BRPM

## 2023-08-29 DIAGNOSIS — G47.33 MODERATE OBSTRUCTIVE SLEEP APNEA: Primary | ICD-10-CM

## 2023-08-29 DIAGNOSIS — E66.01 MORBID OBESITY (HCC): ICD-10-CM

## 2023-08-29 PROCEDURE — 99213 OFFICE O/P EST LOW 20 MIN: CPT | Performed by: STUDENT IN AN ORGANIZED HEALTH CARE EDUCATION/TRAINING PROGRAM

## 2023-08-29 PROCEDURE — 1036F TOBACCO NON-USER: CPT | Performed by: STUDENT IN AN ORGANIZED HEALTH CARE EDUCATION/TRAINING PROGRAM

## 2023-08-29 PROCEDURE — G8400 PT W/DXA NO RESULTS DOC: HCPCS | Performed by: STUDENT IN AN ORGANIZED HEALTH CARE EDUCATION/TRAINING PROGRAM

## 2023-08-29 PROCEDURE — 1090F PRES/ABSN URINE INCON ASSESS: CPT | Performed by: STUDENT IN AN ORGANIZED HEALTH CARE EDUCATION/TRAINING PROGRAM

## 2023-08-29 PROCEDURE — 3079F DIAST BP 80-89 MM HG: CPT | Performed by: STUDENT IN AN ORGANIZED HEALTH CARE EDUCATION/TRAINING PROGRAM

## 2023-08-29 PROCEDURE — 3077F SYST BP >= 140 MM HG: CPT | Performed by: STUDENT IN AN ORGANIZED HEALTH CARE EDUCATION/TRAINING PROGRAM

## 2023-08-29 PROCEDURE — 3017F COLORECTAL CA SCREEN DOC REV: CPT | Performed by: STUDENT IN AN ORGANIZED HEALTH CARE EDUCATION/TRAINING PROGRAM

## 2023-08-29 PROCEDURE — G8417 CALC BMI ABV UP PARAM F/U: HCPCS | Performed by: STUDENT IN AN ORGANIZED HEALTH CARE EDUCATION/TRAINING PROGRAM

## 2023-08-29 PROCEDURE — G8427 DOCREV CUR MEDS BY ELIG CLIN: HCPCS | Performed by: STUDENT IN AN ORGANIZED HEALTH CARE EDUCATION/TRAINING PROGRAM

## 2023-08-29 PROCEDURE — 1123F ACP DISCUSS/DSCN MKR DOCD: CPT | Performed by: STUDENT IN AN ORGANIZED HEALTH CARE EDUCATION/TRAINING PROGRAM

## 2023-08-29 ASSESSMENT — ENCOUNTER SYMPTOMS
STRIDOR: 0
EYE PAIN: 0
EYE ITCHING: 0
ABDOMINAL DISTENTION: 0
COLOR CHANGE: 0
EYE REDNESS: 0
TROUBLE SWALLOWING: 0
DIARRHEA: 0
ABDOMINAL PAIN: 0
COUGH: 0
CONSTIPATION: 0
SHORTNESS OF BREATH: 0
NAUSEA: 0
SORE THROAT: 0
WHEEZING: 0
EYE DISCHARGE: 0
BACK PAIN: 0
VOMITING: 0

## 2023-08-29 NOTE — PROGRESS NOTES
MA Communication:   The following orders are received by verbal communication from   Yuki Jaffe MD    Orders include:  FU 1 YR SLEEP

## 2023-08-29 NOTE — PROGRESS NOTES
[facility] Pulmonary Follow-up  1 [facility], [address] [place] [address],[address]  [address] (: 1955 ) is a 79 y.o. female here for an evaluation of   Chief Complaint   Patient presents with    Follow-up     1 YR    Sleep Apnea         SUBJECTIVE/OBJECTIVE:  Patient is 51-year-old female with significant past medical history of SVT, obesity, moderate ANNA that presents to [facility] pulmonary clinic for follow-up visit. Patient has no complaints today. She denies any fever, chills, shortness of breath, nausea, vomiting, cough. She is using her mask for sleep apnea and says that she loves it. She has no issues. She is here for follow-up of compliance report. Per Dr. Harle Skiff:  Carroll Bateman is a 74y.o. year old female with a history of SVT and syncope who presents who presents for annual follow-up for ANNA. Her PCP is Dr. Adan Duke, cardiologists Jacob Valdes and Dr. Al Hawkins. Mercedez Calderon was last seen on 2021. She is on auto CPAP 5-15 cm H2O, her DME is [employer]. The patient has been compliant with BiPAP. She goes to bed between 10 and 10:30 PM, does have her TV on, but it is on her timer. She falls asleep within a few minutes. She has very deep sleep, occasionally wakes up to urinate if she has drunk more liquids in the daytime. She wakes up between 6 and 6:30 AM, is refreshed and does not take any daytime naps. She has had some eye dryness, is aware that her antihistamine and Vesicare. She is not comfortable with a nasal mask, wants to change back to a full facemask. She has been off caffeine, does eat chocolate occasionally. She has not gained any weight, but has not lost weight either. She has had decreased vision, has her grandson drive for her. She is due to undergo cataract surgery, but mentions the ophthalmologist does not feel she is ready for surgery yet.   There was no other change in her medical or surgical history since her last visit, she

## 2023-08-29 NOTE — PATIENT INSTRUCTIONS
Remember to bring a list of pulmonary medications and any CPAP or BiPAP machines to your next appointment with the office. Please keep all of your future appointments scheduled by [address] [place] [address] Ave, [facility] Pulmonary office. Out of respect for other patients and providers, you may be asked to reschedule your appointment if you arrive later than your scheduled appointment time. Appointments cancelled less than 24hrs in advance will be considered a no show. Patients with three missed appointments within 1 year or four missed appointments within 2 years can be dismissed from the practice. Please be aware that our physicians are required to work in the Intensive Care Unit at [facility].  Your appointment may need to be rescheduled if they are designated to work during your appointment time. You may receive a survey regarding the care you received during your visit. Your input is valuable to us. We encourage you to complete and return your survey. We hope you will choose us in the future for your healthcare needs. Pt instructed of all future appointment dates & times, including radiology, labs, procedures & referrals. If procedures were scheduled preparation instructions provided. Instructions on future appointments with [facility] Pulmonary were given.

## 2023-09-05 ENCOUNTER — NURSE ONLY (OUTPATIENT)
Dept: CARDIOLOGY CLINIC | Age: 68
End: 2023-09-05

## 2023-09-06 ENCOUNTER — TELEPHONE (OUTPATIENT)
Dept: FAMILY MEDICINE CLINIC | Age: 68
End: 2023-09-06

## 2023-09-06 RX ORDER — LISINOPRIL 40 MG/1
TABLET ORAL
Qty: 30 TABLET | Refills: 3 | Status: SHIPPED | OUTPATIENT
Start: 2023-09-06

## 2023-09-06 RX ORDER — MELOXICAM 7.5 MG/1
TABLET ORAL
Qty: 30 TABLET | Refills: 3 | Status: SHIPPED | OUTPATIENT
Start: 2023-09-06

## 2023-09-19 ENCOUNTER — TELEMEDICINE (OUTPATIENT)
Dept: FAMILY MEDICINE CLINIC | Age: 68
End: 2023-09-19
Payer: MEDICARE

## 2023-09-19 DIAGNOSIS — Z00.00 MEDICARE ANNUAL WELLNESS VISIT, SUBSEQUENT: Primary | ICD-10-CM

## 2023-09-19 PROCEDURE — 1123F ACP DISCUSS/DSCN MKR DOCD: CPT | Performed by: FAMILY MEDICINE

## 2023-09-19 PROCEDURE — G0439 PPPS, SUBSEQ VISIT: HCPCS | Performed by: FAMILY MEDICINE

## 2023-09-19 PROCEDURE — 3017F COLORECTAL CA SCREEN DOC REV: CPT | Performed by: FAMILY MEDICINE

## 2023-09-19 SDOH — ECONOMIC STABILITY: FOOD INSECURITY: WITHIN THE PAST 12 MONTHS, YOU WORRIED THAT YOUR FOOD WOULD RUN OUT BEFORE YOU GOT MONEY TO BUY MORE.: NEVER TRUE

## 2023-09-19 SDOH — ECONOMIC STABILITY: INCOME INSECURITY: HOW HARD IS IT FOR YOU TO PAY FOR THE VERY BASICS LIKE FOOD, HOUSING, MEDICAL CARE, AND HEATING?: NOT HARD AT ALL

## 2023-09-19 SDOH — ECONOMIC STABILITY: HOUSING INSECURITY
IN THE LAST 12 MONTHS, WAS THERE A TIME WHEN YOU DID NOT HAVE A STEADY PLACE TO SLEEP OR SLEPT IN A SHELTER (INCLUDING NOW)?: NO

## 2023-09-19 SDOH — ECONOMIC STABILITY: FOOD INSECURITY: WITHIN THE PAST 12 MONTHS, THE FOOD YOU BOUGHT JUST DIDN'T LAST AND YOU DIDN'T HAVE MONEY TO GET MORE.: NEVER TRUE

## 2023-09-19 ASSESSMENT — PATIENT HEALTH QUESTIONNAIRE - PHQ9
SUM OF ALL RESPONSES TO PHQ QUESTIONS 1-9: 0
2. FEELING DOWN, DEPRESSED OR HOPELESS: 0
SUM OF ALL RESPONSES TO PHQ QUESTIONS 1-9: 0
1. LITTLE INTEREST OR PLEASURE IN DOING THINGS: 0
SUM OF ALL RESPONSES TO PHQ9 QUESTIONS 1 & 2: 0
SUM OF ALL RESPONSES TO PHQ QUESTIONS 1-9: 0
SUM OF ALL RESPONSES TO PHQ QUESTIONS 1-9: 0

## 2023-09-19 ASSESSMENT — LIFESTYLE VARIABLES
HOW MANY STANDARD DRINKS CONTAINING ALCOHOL DO YOU HAVE ON A TYPICAL DAY: 1 OR 2
HOW OFTEN DO YOU HAVE A DRINK CONTAINING ALCOHOL: MONTHLY OR LESS

## 2023-09-19 NOTE — PATIENT INSTRUCTIONS
Personalized Preventive Plan for Biju Foley - 9/19/2023  Medicare offers a range of preventive health benefits. Some of the tests and screenings are paid in full while other may be subject to a deductible, co-insurance, and/or copay. Some of these benefits include a comprehensive review of your medical history including lifestyle, illnesses that may run in your family, and various assessments and screenings as appropriate. After reviewing your medical record and screening and assessments performed today your provider may have ordered immunizations, labs, imaging, and/or referrals for you. A list of these orders (if applicable) as well as your Preventive Care list are included within your After Visit Summary for your review. Other Preventive Recommendations:    A preventive eye exam performed by an eye specialist is recommended every 1-2 years to screen for glaucoma; cataracts, macular degeneration, and other eye disorders. A preventive dental visit is recommended every 6 months. Try to get at least 150 minutes of exercise per week or 10,000 steps per day on a pedometer . Order or download the FREE \"Exercise & Physical Activity: Your Everyday Guide\" from [employer]. Call 5-841.187.5282 or search [employer] online. You need 1125-2492 mg of calcium and 2697-7782 IU of vitamin D per day. It is possible to meet your calcium requirement with diet alone, but a vitamin D supplement is usually necessary to meet this goal.  When exposed to the sun, use a sunscreen that protects against both UVA and UVB radiation with an SPF of 30 or greater. Reapply every 2 to 3 hours or after sweating, drying off with a towel, or swimming. Always wear a seat belt when traveling in a car. Always wear a helmet when riding a bicycle or motorcycle.

## 2023-09-19 NOTE — PROGRESS NOTES
This encounter was performed under my, Orestes Valera, direct supervision, 9/19/2023.
Facility (Appt Dept): [address] Luis Armando Hodge

## 2023-09-28 NOTE — PROGRESS NOTES
without delay or bruit  Peripheral pulses are symmetrical and full   Abdomen:  No masses or tenderness  Bowel sounds present  Extremities:   No cyanosis or clubbing   No lower extremity edema   Skin: warm and dry  Neurological:  Alert and oriented  Moves all extremities well  No abnormalities of mood, affect, memory, mentation, or behavior are noted    DATA:    ECG 10/24/2022:  AP  93 bpm     Echo 10/16/2017:  Rhythm appears to be atrial fibrillations. Left ventricular systolic function is normal with the ejection fraction estimated at 55%. No regional wall motion abnormalities. There is mild concentric left ventricular hypertrophy. Left ventricular diastolic filling pressure is indeterminate secondary to arrhythmia. The right ventricle is mildly enlarged. Right atrial size is mildly dilated. No significant valvular heart disease. All labs and testing reviewed. CARDIOLOGY LABS:   CBC: No results for input(s): \"WBC\", \"HGB\", \"HCT\", \"PLT\" in the last 72 hours. BMP: No results for input(s): \"NA\", \"K\", \"CO2\", \"BUN\", \"CREATININE\", \"LABGLOM\", \"GLUCOSE\" in the last 72 hours. PT/INR: No results for input(s): \"PROTIME\", \"INR\" in the last 72 hours. APTT:No results for input(s): \"APTT\" in the last 72 hours. FASTING LIPID PANEL:  Lab Results   Component Value Date/Time    HDL 53 02/14/2023 08:51 AM    HDL 54 01/05/2012 08:52 AM    LDLCALC 97 02/14/2023 08:51 AM    TRIG 84 02/14/2023 08:51 AM     LIVER PROFILE:No results for input(s): \"AST\", \"ALT\", \"ALB\" in the last 72 hours.     IMPRESSION:    Patient Active Problem List   Diagnosis    Hyperglycemia    Dermatitis    Essential hypertension    Hypercholesteremia    Postmenopausal disorder    Sinus node dysfunction (HCC)    Pacemaker    Paroxysmal atrial tachycardia    Arthritis    ANNA on CPAP    Renal insufficiency       Assessment:   Sinus node dysfunction: stable    -s/p dual chamber pacemaker implant 11/2017   -device check per HPI  PAT: stable   HTN:

## 2023-10-02 ENCOUNTER — OFFICE VISIT (OUTPATIENT)
Dept: CARDIOLOGY CLINIC | Age: 68
End: 2023-10-02
Payer: MEDICARE

## 2023-10-02 ENCOUNTER — NURSE ONLY (OUTPATIENT)
Dept: CARDIOLOGY CLINIC | Age: 68
End: 2023-10-02
Payer: MEDICARE

## 2023-10-02 VITALS
HEART RATE: 60 BPM | BODY MASS INDEX: 44.29 KG/M2 | SYSTOLIC BLOOD PRESSURE: 124 MMHG | HEIGHT: 61 IN | OXYGEN SATURATION: 97 % | DIASTOLIC BLOOD PRESSURE: 88 MMHG | WEIGHT: 234.6 LBS

## 2023-10-02 DIAGNOSIS — Z95.0 PACEMAKER: Primary | ICD-10-CM

## 2023-10-02 DIAGNOSIS — I47.19 PAROXYSMAL ATRIAL TACHYCARDIA: Primary | ICD-10-CM

## 2023-10-02 DIAGNOSIS — I49.5 SINUS NODE DYSFUNCTION (HCC): ICD-10-CM

## 2023-10-02 DIAGNOSIS — I10 ESSENTIAL HYPERTENSION: ICD-10-CM

## 2023-10-02 LAB
LEFT VENTRICULAR EJECTION FRACTION MODE: NORMAL
LV EF: 40 % (ref 40–45)

## 2023-10-02 PROCEDURE — 99214 OFFICE O/P EST MOD 30 MIN: CPT | Performed by: NURSE PRACTITIONER

## 2023-10-02 PROCEDURE — 3079F DIAST BP 80-89 MM HG: CPT | Performed by: NURSE PRACTITIONER

## 2023-10-02 PROCEDURE — 1090F PRES/ABSN URINE INCON ASSESS: CPT | Performed by: NURSE PRACTITIONER

## 2023-10-02 PROCEDURE — G8484 FLU IMMUNIZE NO ADMIN: HCPCS | Performed by: NURSE PRACTITIONER

## 2023-10-02 PROCEDURE — 93280 PM DEVICE PROGR EVAL DUAL: CPT | Performed by: INTERNAL MEDICINE

## 2023-10-02 PROCEDURE — G8400 PT W/DXA NO RESULTS DOC: HCPCS | Performed by: NURSE PRACTITIONER

## 2023-10-02 PROCEDURE — 1123F ACP DISCUSS/DSCN MKR DOCD: CPT | Performed by: NURSE PRACTITIONER

## 2023-10-02 PROCEDURE — 1036F TOBACCO NON-USER: CPT | Performed by: NURSE PRACTITIONER

## 2023-10-02 PROCEDURE — 3017F COLORECTAL CA SCREEN DOC REV: CPT | Performed by: NURSE PRACTITIONER

## 2023-10-02 PROCEDURE — G8427 DOCREV CUR MEDS BY ELIG CLIN: HCPCS | Performed by: NURSE PRACTITIONER

## 2023-10-02 PROCEDURE — 3074F SYST BP LT 130 MM HG: CPT | Performed by: NURSE PRACTITIONER

## 2023-10-02 PROCEDURE — G8417 CALC BMI ABV UP PARAM F/U: HCPCS | Performed by: NURSE PRACTITIONER

## 2023-10-02 PROCEDURE — 93000 ELECTROCARDIOGRAM COMPLETE: CPT | Performed by: NURSE PRACTITIONER

## 2023-10-02 NOTE — PATIENT INSTRUCTIONS
No changes today    Remote device transmissions every three months     Update echo. Call [phone] to schedule.      Follow up in one year or sooner if needed

## 2023-10-04 RX ORDER — TERAZOSIN 5 MG/1
CAPSULE ORAL
Qty: 30 CAPSULE | Refills: 5 | Status: SHIPPED | OUTPATIENT
Start: 2023-10-04

## 2023-10-20 ENCOUNTER — HOSPITAL ENCOUNTER (OUTPATIENT)
Dept: NON INVASIVE DIAGNOSTICS | Age: 68
Discharge: HOME | End: 2023-10-20
Payer: MEDICARE

## 2023-10-20 DIAGNOSIS — I47.19 PAROXYSMAL ATRIAL TACHYCARDIA: ICD-10-CM

## 2023-10-20 PROCEDURE — 93306 TTE W/DOPPLER COMPLETE: CPT

## 2023-10-27 ENCOUNTER — TELEPHONE (OUTPATIENT)
Dept: CARDIOLOGY CLINIC | Age: 68
End: 2023-10-27

## 2023-10-27 DIAGNOSIS — I42.9 CARDIOMYOPATHY, UNSPECIFIED TYPE (HCC): Primary | ICD-10-CM

## 2023-10-27 DIAGNOSIS — R94.31 ABNORMAL ELECTROCARDIOGRAM (ECG) (EKG): ICD-10-CM

## 2023-10-27 NOTE — TELEPHONE ENCOUNTER
Reviewed echo that showed new cardiomyopathy. Patient with persistent RV pacing which could be the culprit. Would like stress test to rule out ischemia. I explained this to her over the phone.  If stress is negative, can consider device upgrade to CRT-P.

## 2023-11-21 ENCOUNTER — HOSPITAL ENCOUNTER (OUTPATIENT)
Dept: CARDIOLOGY | Age: 68
Discharge: HOME | End: 2023-11-21
Payer: MEDICARE

## 2023-11-21 DIAGNOSIS — R94.31 ABNORMAL ELECTROCARDIOGRAM (ECG) (EKG): ICD-10-CM

## 2023-11-21 DIAGNOSIS — I42.9 CARDIOMYOPATHY, UNSPECIFIED TYPE (HCC): ICD-10-CM

## 2023-11-21 PROCEDURE — 93017 CV STRESS TEST TRACING ONLY: CPT

## 2023-11-21 PROCEDURE — A9502 TC99M TETROFOSMIN: HCPCS | Performed by: NURSE PRACTITIONER

## 2023-11-21 PROCEDURE — 3430000000 HC RX DIAGNOSTIC RADIOPHARMACEUTICAL: Performed by: NURSE PRACTITIONER

## 2023-11-21 PROCEDURE — 78452 HT MUSCLE IMAGE SPECT MULT: CPT

## 2023-11-21 PROCEDURE — 6360000002 HC RX W HCPCS: Performed by: NURSE PRACTITIONER

## 2023-11-21 RX ORDER — REGADENOSON 0.08 MG/ML
0.4 INJECTION, SOLUTION INTRAVENOUS
Status: COMPLETED | OUTPATIENT
Start: 2023-11-21 | End: 2023-11-21

## 2023-11-21 RX ADMIN — TETROFOSMIN 33.9 MILLICURIE: 1.38 INJECTION, POWDER, LYOPHILIZED, FOR SOLUTION INTRAVENOUS at 13:45

## 2023-11-21 RX ADMIN — REGADENOSON 0.4 MG: 0.08 INJECTION, SOLUTION INTRAVENOUS at 13:45

## 2023-11-21 RX ADMIN — TETROFOSMIN 11.5 MILLICURIE: 1.38 INJECTION, POWDER, LYOPHILIZED, FOR SOLUTION INTRAVENOUS at 12:25

## 2023-11-22 ENCOUNTER — OFFICE VISIT (OUTPATIENT)
Dept: FAMILY MEDICINE CLINIC | Age: 68
End: 2023-11-22
Payer: MEDICARE

## 2023-11-22 VITALS
OXYGEN SATURATION: 98 % | BODY MASS INDEX: 44.21 KG/M2 | DIASTOLIC BLOOD PRESSURE: 85 MMHG | WEIGHT: 234 LBS | HEART RATE: 62 BPM | SYSTOLIC BLOOD PRESSURE: 161 MMHG

## 2023-11-22 DIAGNOSIS — I10 ESSENTIAL HYPERTENSION: Primary | ICD-10-CM

## 2023-11-22 DIAGNOSIS — E78.00 HYPERCHOLESTEREMIA: ICD-10-CM

## 2023-11-22 DIAGNOSIS — R73.9 HYPERGLYCEMIA: ICD-10-CM

## 2023-11-22 DIAGNOSIS — I51.89 DIASTOLIC DYSFUNCTION: ICD-10-CM

## 2023-11-22 DIAGNOSIS — I51.9 SYSTOLIC DYSFUNCTION: ICD-10-CM

## 2023-11-22 DIAGNOSIS — I49.5 SINUS NODE DYSFUNCTION (HCC): ICD-10-CM

## 2023-11-22 PROCEDURE — 3079F DIAST BP 80-89 MM HG: CPT | Performed by: FAMILY MEDICINE

## 2023-11-22 PROCEDURE — 1036F TOBACCO NON-USER: CPT | Performed by: FAMILY MEDICINE

## 2023-11-22 PROCEDURE — 3017F COLORECTAL CA SCREEN DOC REV: CPT | Performed by: FAMILY MEDICINE

## 2023-11-22 PROCEDURE — G8417 CALC BMI ABV UP PARAM F/U: HCPCS | Performed by: FAMILY MEDICINE

## 2023-11-22 PROCEDURE — 1090F PRES/ABSN URINE INCON ASSESS: CPT | Performed by: FAMILY MEDICINE

## 2023-11-22 PROCEDURE — G8427 DOCREV CUR MEDS BY ELIG CLIN: HCPCS | Performed by: FAMILY MEDICINE

## 2023-11-22 PROCEDURE — G8484 FLU IMMUNIZE NO ADMIN: HCPCS | Performed by: FAMILY MEDICINE

## 2023-11-22 PROCEDURE — 3077F SYST BP >= 140 MM HG: CPT | Performed by: FAMILY MEDICINE

## 2023-11-22 PROCEDURE — 90694 VACC AIIV4 NO PRSRV 0.5ML IM: CPT | Performed by: FAMILY MEDICINE

## 2023-11-22 PROCEDURE — G0008 ADMIN INFLUENZA VIRUS VAC: HCPCS | Performed by: FAMILY MEDICINE

## 2023-11-22 PROCEDURE — G8400 PT W/DXA NO RESULTS DOC: HCPCS | Performed by: FAMILY MEDICINE

## 2023-11-22 PROCEDURE — 99214 OFFICE O/P EST MOD 30 MIN: CPT | Performed by: FAMILY MEDICINE

## 2023-11-22 PROCEDURE — 36415 COLL VENOUS BLD VENIPUNCTURE: CPT | Performed by: FAMILY MEDICINE

## 2023-11-22 PROCEDURE — 1123F ACP DISCUSS/DSCN MKR DOCD: CPT | Performed by: FAMILY MEDICINE

## 2023-11-22 NOTE — PATIENT INSTRUCTIONS
Please read the healthy family handout that you were given and share it with your family. Please compare this printed medication list with your medications at home to be sure they are the same. If you have any medications that are different please contact us immediately at [phone]. Also review your allergies that we have listed, these may also include medications that you have not been able to tolerate, make sure everything listed is correct. If you have any allergies that are different please contact us immediately at [phone]. You may receive a survey in the mail or by email asking about your experience during your visit today. Please complete and return to us so we know how we are serving you.

## 2023-11-23 LAB
ALT SERPL-CCNC: 55 U/L (ref 10–40)
ANION GAP SERPL CALCULATED.3IONS-SCNC: 12 MMOL/L (ref 3–16)
BUN SERPL-MCNC: 15 MG/DL (ref 7–20)
CALCIUM SERPL-MCNC: 9.6 MG/DL (ref 8.3–10.6)
CHLORIDE SERPL-SCNC: 107 MMOL/L (ref 99–110)
CHOLEST SERPL-MCNC: 175 MG/DL (ref 0–199)
CO2 SERPL-SCNC: 24 MMOL/L (ref 21–32)
CREAT SERPL-MCNC: 0.9 MG/DL (ref 0.6–1.2)
EST. AVERAGE GLUCOSE BLD GHB EST-MCNC: 105.4 MG/DL
GFR SERPLBLD CREATININE-BSD FMLA CKD-EPI: >60 ML/MIN/{1.73_M2}
GLUCOSE SERPL-MCNC: 104 MG/DL (ref 70–99)
HBA1C MFR BLD: 5.3 %
HDLC SERPL-MCNC: 58 MG/DL (ref 40–60)
LDLC SERPL CALC-MCNC: 97 MG/DL
POTASSIUM SERPL-SCNC: 4 MMOL/L (ref 3.5–5.1)
SODIUM SERPL-SCNC: 143 MMOL/L (ref 136–145)
TRIGL SERPL-MCNC: 102 MG/DL (ref 0–150)
VLDLC SERPL CALC-MCNC: 20 MG/DL

## 2023-11-28 ENCOUNTER — TELEPHONE (OUTPATIENT)
Dept: CARDIOLOGY CLINIC | Age: 68
End: 2023-11-28

## 2023-11-28 NOTE — TELEPHONE ENCOUNTER
Yany Reynolds, APRN - CNP sent to Maribel Goodman  Please schedule Brenda for an [facility] with Dr. Morales due to abnormal echo and stress. Patient is aware we will calling. Thanks.    Yany      Procedure:  [facility]  Doctor:  Dr. Morales  Date:  12/12/23  Time:  9am  Arrival:  7:30am  Reps:  n/a  Anesthesia:  n/a      Spoke with patient. Please have patient arrive to the main entrance of [facility] ([address] [place], [place] [id number]) and check in with the registration desk.  They will be directed to the Cath Lab.  Please call patient regarding medication instructions. Remind patient to be NPO after midnight (8 hours prior). Do not apply lotions/creams on skin the day of procedure.

## 2023-11-28 NOTE — TELEPHONE ENCOUNTER
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 She stated that NP[facility] specifically stated it would be a RHC on the phone call and would like for NPAM to verify.

## 2023-11-28 NOTE — TELEPHONE ENCOUNTER
Pt states she was told that she would be receiving a call to schedule an angiogram last week, but pt has not yet received a call. Pt would like to speak with NP[facility] about this.

## 2023-11-28 NOTE — TELEPHONE ENCOUNTER
Pt returned call. Pt states she was told it would be a RHC not LHC. Pt would like to make sure thsis is correct and would like to speak with NPAM.

## 2023-11-29 NOTE — TELEPHONE ENCOUNTER
Pt returned call. Message given. Pt asked which wrist would be used for LHC. Ask Medical staff and they said whichever they can access. Pt BRYCE.

## 2023-12-04 ENCOUNTER — TELEPHONE (OUTPATIENT)
Dept: CARDIOLOGY CLINIC | Age: 68
End: 2023-12-04

## 2023-12-04 NOTE — TELEPHONE ENCOUNTER
PT called and stated she has been having trouble sending a transmission in. PT stated she spoke with [employer] and they are sending a new a transmitter. PT stated she will send a transmission in as soon as she gets the new one.  Pt ph# [phone]

## 2023-12-05 ENCOUNTER — NURSE ONLY (OUTPATIENT)
Dept: CARDIOLOGY CLINIC | Age: 68
End: 2023-12-05

## 2023-12-05 DIAGNOSIS — Z95.0 PACEMAKER: ICD-10-CM

## 2023-12-09 PROCEDURE — 93294 REM INTERROG EVL PM/LDLS PM: CPT | Performed by: INTERNAL MEDICINE

## 2023-12-09 PROCEDURE — 93296 REM INTERROG EVL PM/IDS: CPT | Performed by: INTERNAL MEDICINE

## 2023-12-12 ENCOUNTER — HOSPITAL ENCOUNTER (OUTPATIENT)
Dept: CARDIAC CATH/INVASIVE PROCEDURES | Age: 68
Discharge: HOME | End: 2023-12-12
Attending: INTERNAL MEDICINE | Admitting: INTERNAL MEDICINE
Payer: MEDICARE

## 2023-12-12 VITALS
SYSTOLIC BLOOD PRESSURE: 180 MMHG | WEIGHT: 231 LBS | HEART RATE: 62 BPM | OXYGEN SATURATION: 99 % | RESPIRATION RATE: 19 BRPM | DIASTOLIC BLOOD PRESSURE: 88 MMHG | BODY MASS INDEX: 43.65 KG/M2

## 2023-12-12 PROBLEM — I42.9 CARDIOMYOPATHY (HCC): Status: ACTIVE | Noted: 2023-12-12

## 2023-12-12 LAB
ANION GAP SERPL CALCULATED.3IONS-SCNC: 9 MMOL/L (ref 3–16)
BUN SERPL-MCNC: 14 MG/DL (ref 7–20)
CALCIUM SERPL-MCNC: 9.7 MG/DL (ref 8.3–10.6)
CHLORIDE SERPL-SCNC: 107 MMOL/L (ref 99–110)
CHOLEST SERPL-MCNC: 165 MG/DL (ref 0–199)
CO2 SERPL-SCNC: 24 MMOL/L (ref 21–32)
CREAT SERPL-MCNC: 0.9 MG/DL (ref 0.6–1.2)
DEPRECATED RDW RBC AUTO: 13.1 % (ref 12.4–15.4)
EKG ATRIAL RATE: 300 BPM
EKG DIAGNOSIS: NORMAL
EKG Q-T INTERVAL: 500 MS
EKG QRS DURATION: 184 MS
EKG QTC CALCULATION (BAZETT): 500 MS
EKG R AXIS: -68 DEGREES
EKG T AXIS: 100 DEGREES
EKG VENTRICULAR RATE: 60 BPM
GFR SERPLBLD CREATININE-BSD FMLA CKD-EPI: >60 ML/MIN/{1.73_M2}
GLUCOSE SERPL-MCNC: 111 MG/DL (ref 70–99)
HCT VFR BLD AUTO: 41.5 % (ref 36–48)
HDLC SERPL-MCNC: 57 MG/DL (ref 40–60)
HGB BLD-MCNC: 13.8 G/DL (ref 12–16)
INR PPP: 1.08 (ref 0.84–1.16)
LDLC SERPL CALC-MCNC: 85 MG/DL
MCH RBC QN AUTO: 30.4 PG (ref 26–34)
MCHC RBC AUTO-ENTMCNC: 33.1 G/DL (ref 31–36)
MCV RBC AUTO: 91.8 FL (ref 80–100)
PLATELET # BLD AUTO: 313 K/UL (ref 135–450)
PMV BLD AUTO: 8.5 FL (ref 5–10.5)
POTASSIUM SERPL-SCNC: 4.1 MMOL/L (ref 3.5–5.1)
PROTHROMBIN TIME: 14 SEC (ref 11.5–14.8)
RBC # BLD AUTO: 4.52 M/UL (ref 4–5.2)
SODIUM SERPL-SCNC: 140 MMOL/L (ref 136–145)
TRIGL SERPL-MCNC: 114 MG/DL (ref 0–150)
VLDLC SERPL CALC-MCNC: 23 MG/DL
WBC # BLD AUTO: 7.9 K/UL (ref 4–11)

## 2023-12-12 PROCEDURE — 2709999900 HC NON-CHARGEABLE SUPPLY

## 2023-12-12 PROCEDURE — 93458 L HRT ARTERY/VENTRICLE ANGIO: CPT

## 2023-12-12 PROCEDURE — C1894 INTRO/SHEATH, NON-LASER: HCPCS

## 2023-12-12 PROCEDURE — C1769 GUIDE WIRE: HCPCS

## 2023-12-12 PROCEDURE — 2500000003 HC RX 250 WO HCPCS

## 2023-12-12 PROCEDURE — 93005 ELECTROCARDIOGRAM TRACING: CPT | Performed by: INTERNAL MEDICINE

## 2023-12-12 PROCEDURE — 85610 PROTHROMBIN TIME: CPT

## 2023-12-12 PROCEDURE — 93010 ELECTROCARDIOGRAM REPORT: CPT | Performed by: INTERNAL MEDICINE

## 2023-12-12 PROCEDURE — 6360000002 HC RX W HCPCS

## 2023-12-12 PROCEDURE — 80048 BASIC METABOLIC PNL TOTAL CA: CPT

## 2023-12-12 PROCEDURE — 80061 LIPID PANEL: CPT

## 2023-12-12 PROCEDURE — 85027 COMPLETE CBC AUTOMATED: CPT

## 2023-12-12 RX ORDER — METOPROLOL SUCCINATE 100 MG/1
50 TABLET, EXTENDED RELEASE ORAL DAILY
Qty: 90 TABLET | Refills: 3 | Status: SHIPPED | OUTPATIENT
Start: 2023-12-12 | End: 2023-12-15 | Stop reason: DRUGHIGH

## 2023-12-12 RX ORDER — ASPIRIN 325 MG
325 TABLET ORAL ONCE
Status: DISCONTINUED | OUTPATIENT
Start: 2023-12-12 | End: 2023-12-12 | Stop reason: HOSPADM

## 2023-12-12 RX ORDER — ACETAMINOPHEN 325 MG/1
650 TABLET ORAL EVERY 4 HOURS PRN
Status: DISCONTINUED | OUTPATIENT
Start: 2023-12-12 | End: 2023-12-12 | Stop reason: HOSPADM

## 2023-12-12 RX ORDER — SODIUM CHLORIDE 0.9 % (FLUSH) 0.9 %
5-40 SYRINGE (ML) INJECTION PRN
Status: DISCONTINUED | OUTPATIENT
Start: 2023-12-12 | End: 2023-12-12 | Stop reason: HOSPADM

## 2023-12-12 RX ORDER — FENTANYL CITRATE 50 UG/ML
INJECTION, SOLUTION INTRAMUSCULAR; INTRAVENOUS
Status: COMPLETED | OUTPATIENT
Start: 2023-12-12 | End: 2023-12-12

## 2023-12-12 RX ORDER — ONDANSETRON 2 MG/ML
4 INJECTION INTRAMUSCULAR; INTRAVENOUS EVERY 6 HOURS PRN
Status: DISCONTINUED | OUTPATIENT
Start: 2023-12-12 | End: 2023-12-12 | Stop reason: HOSPADM

## 2023-12-12 RX ORDER — HEPARIN SODIUM 1000 [USP'U]/ML
INJECTION, SOLUTION INTRAVENOUS; SUBCUTANEOUS
Status: COMPLETED | OUTPATIENT
Start: 2023-12-12 | End: 2023-12-12

## 2023-12-12 RX ORDER — LORAZEPAM 0.5 MG/1
0.5 TABLET ORAL
Status: DISCONTINUED | OUTPATIENT
Start: 2023-12-12 | End: 2023-12-12 | Stop reason: HOSPADM

## 2023-12-12 RX ORDER — SODIUM CHLORIDE 9 MG/ML
INJECTION, SOLUTION INTRAVENOUS PRN
Status: DISCONTINUED | OUTPATIENT
Start: 2023-12-12 | End: 2023-12-12 | Stop reason: HOSPADM

## 2023-12-12 RX ORDER — MIDAZOLAM HYDROCHLORIDE 1 MG/ML
INJECTION INTRAMUSCULAR; INTRAVENOUS
Status: COMPLETED | OUTPATIENT
Start: 2023-12-12 | End: 2023-12-12

## 2023-12-12 RX ORDER — SODIUM CHLORIDE 0.9 % (FLUSH) 0.9 %
5-40 SYRINGE (ML) INJECTION EVERY 12 HOURS SCHEDULED
Status: DISCONTINUED | OUTPATIENT
Start: 2023-12-12 | End: 2023-12-12 | Stop reason: HOSPADM

## 2023-12-12 RX ADMIN — FENTANYL CITRATE 50 MCG: 50 INJECTION, SOLUTION INTRAMUSCULAR; INTRAVENOUS at 10:22

## 2023-12-12 RX ADMIN — HEPARIN SODIUM 5000 UNITS: 1000 INJECTION, SOLUTION INTRAVENOUS; SUBCUTANEOUS at 10:30

## 2023-12-12 RX ADMIN — MIDAZOLAM HYDROCHLORIDE 1 MG: 1 INJECTION INTRAMUSCULAR; INTRAVENOUS at 10:22

## 2023-12-12 NOTE — PROCEDURES
CARDIAC CATHETERIZATION REPORT    Date of Procedure: 12/12/2023  : Dayami Morales DO  Primary Indication: LV systolic heart failure, newly diagnosed cardiomyopathy    Procedures Performed:  1. Coronary angiography  2. Left heart catheterization  3. Moderate conscious sedation    Procedural Details:  Access: Local anesthetic was given and access was obtained in the right radial artery using a micropuncture technique and a 6F Terumo Slender Sheath was placed without difficulty. Diagnostic: A 5F TIG catheter was used to perform selective right and left coronary angiography. The 5F TIG catheter was also used to perform the left heart catheterization. No significant gradient was observed on pull-back of the catheter across the aortic valve. Hemostasis: At the end of the procedure, the radial sheath was removed and a hemoband was placed over the arteriotomy site and filled with air to maintain hemostasis. Findings:  Hemodynamics:     A. Opening arterial pressure: 149/74 (99) mmHg      B. LVEDP: 11 mmHg    2. Coronary anatomy:  A. Left main artery: The left main artery bifurcates into the left anterior descending artery and left circumflex artery. The left main artery has no angiographically significant disease. B. Left anterior descending artery: Transapical vessel which gives rise to 2 diagonal arteries. The LAD has a 40% mid-vessel stenosis immediately after the bifurcation of the second diagonal artery. The remainder of the vessel has minor luminal irregularities. The first diagonal artery is a small vessel with minor luminal irregularities. The second diagonal artery is a large, branching vessel with minor luminal irregularities. C. Left circumflex artery: Non-dominant vessel that gives rise to 3 obtuse marginal arteries. The Lcx has a 20% ostial stenosis. The remainder of the vessel has minor luminal irregularities. The OM1 is a very small vessel with a high origin.   The OM2 and OM3 are

## 2023-12-12 NOTE — DISCHARGE INSTRUCTIONS
pressure, or a strange feeling in your back, neck or jaw, or upper belly or in one or both shoulders or arms. Lightheadedness or sudden weakness. A fast or irregular heartbeat. After you call 911, the  may tell you to chew 1 adult-strength or 2 to 4                  low-dose aspirin. Wait for an ambulance. Do not try to drive yourself. Call your doctor today if :  You have any trouble breathing. Your chest pain gets worse. You are dizzy or lightheaded, or you feel like you may faint. You are not getting better as expected. You are having new or different chest pain.

## 2023-12-13 ENCOUNTER — PATIENT MESSAGE (OUTPATIENT)
Dept: CARDIOLOGY CLINIC | Age: 68
End: 2023-12-13

## 2023-12-13 NOTE — TELEPHONE ENCOUNTER
[facility] please advise-per review of medication list, states metoprolol succinate 100mg 0.5 tablets daily--however in procedure note:  Plan:  1. Continue aspirin 81 mg daily, atorvastatin 40 mg daily, and lisinopril 40 mg daily. 2. Transition from atenolol to metoprolol succinate 100 mg daily to further optimize medical therapy for underlying cardiomyopathy. Patient sent [employer] message inquiring on dosage clarification. Please advise.

## 2023-12-13 NOTE — TELEPHONE ENCOUNTER
From: Yanna Phipps  To: Yany Reynolds  Sent: 12/13/2023 4:29 PM EST  Subject: Medication change question    After my cardiac catheterization yesterday, Dr. Gwendloyn Galeazzi changed my medication from 100 mg atenolol daily to   100 mg of metoprolol succinate ER (per my chart). The  pharmacy at [facility] filled the Rx as take one-  half tablet by mouth once a day, which would only be 50 mg total. Could you check on this for me?

## 2023-12-15 RX ORDER — METOPROLOL SUCCINATE 100 MG/1
100 TABLET, EXTENDED RELEASE ORAL DAILY
Qty: 90 TABLET | Refills: 1 | Status: SHIPPED | OUTPATIENT
Start: 2023-12-15

## 2024-01-04 RX ORDER — MELOXICAM 7.5 MG/1
TABLET ORAL
Qty: 30 TABLET | Refills: 3 | Status: SHIPPED | OUTPATIENT
Start: 2024-01-04

## 2024-01-04 RX ORDER — ATORVASTATIN CALCIUM 40 MG/1
TABLET, FILM COATED ORAL
Qty: 30 TABLET | Refills: 5 | Status: SHIPPED | OUTPATIENT
Start: 2024-01-04

## 2024-01-04 RX ORDER — LISINOPRIL 40 MG/1
TABLET ORAL
Qty: 30 TABLET | Refills: 3 | Status: SHIPPED | OUTPATIENT
Start: 2024-01-04

## 2024-01-04 NOTE — TELEPHONE ENCOUNTER
Future appt scheduled 05/22/2024                Last appt 11/22/2023      Last Written     lisinopril (PRINIVIL;ZESTRIL) 40 MG tablet  09/06/2023  #30  3 RF     meloxicam (MOBIC) 7.5 MG tablet  09/06/2023  #30  3 RF     atorvastatin (LIPITOR) 40 MG tablet  07/06/2023  #30  5 RF

## 2024-01-17 NOTE — PROGRESS NOTES
[facility]   Electrophysiology Outpatient Note              Date:  January 18, 2024  Patient name: Brenda Mcleod  YOB: 1955    Primary Care physician: Luis Manuel Crocker MD    HISTORY OF PRESENT ILLNESS: The patient is a 68 y.o.  female with a history of sinus node dysfunction, DC PPM, syncope, PAT, hypertension and ANNA    On 10/2017 patient was admitted to the hospital for syncope.  Echo revealed EF 55%.  She was discharged with a 30-day event monitor which showed multiple pauses.  She had 6.4-second pause with syncope.  On 11/2017 patient underwent implantation of dual-chamber pacemaker.  Patient had PAT during her hospitalization and was started on atenolol.    On 10/2/2023 patient was seen in office for sinus node dysfunction and PAT ECG revealed a PVP with heart rate of 60.  She did endorse some shortness of breath with exertion.    Today she is being seen for possible device upgrade. Device check reveals Mode AAI>DDD.  AP 69.7%,  99.6% , RA impedance 347 ohms, RV impedance 646 ohms.  1 NSVT event x 9 beats recorded . EKG shows sinus rhythm rate 64 . Patient had cardiac cath performed on 12/12/2023 for new cardiomyopathy.  Patient was found to have mild to moderate CAD.  She was switched from atenolol to metoprolol 100 mg at that time.  She is hypertensive today and states when she checks her blood pressure at home it is elevated over 150.  Patient denies chest pain, shortness of breath and palpitations.  States her quality of life has not changed.  She does not get short of breath with activity and less \"cutting the grass\".  She continues to help with  classes.  Patient states she used to follow with Dr. Reardon and has not seen a new EP doctor since.  Will set her up with  to decide on device upgrade.    Past Medical History:   has a past medical history of HTN, SVT (supraventricular tachycardia), and Wrist fracture, closed, left, with routine

## 2024-01-18 ENCOUNTER — NURSE ONLY (OUTPATIENT)
Dept: CARDIOLOGY CLINIC | Age: 69
End: 2024-01-18

## 2024-01-18 ENCOUNTER — OFFICE VISIT (OUTPATIENT)
Dept: CARDIOLOGY CLINIC | Age: 69
End: 2024-01-18
Payer: MEDICARE

## 2024-01-18 ENCOUNTER — TELEPHONE (OUTPATIENT)
Dept: CARDIOLOGY CLINIC | Age: 69
End: 2024-01-18

## 2024-01-18 VITALS
DIASTOLIC BLOOD PRESSURE: 90 MMHG | BODY MASS INDEX: 43.88 KG/M2 | WEIGHT: 232.4 LBS | HEART RATE: 70 BPM | SYSTOLIC BLOOD PRESSURE: 168 MMHG | OXYGEN SATURATION: 98 % | HEIGHT: 61 IN

## 2024-01-18 DIAGNOSIS — I25.5 ISCHEMIC CARDIOMYOPATHY: ICD-10-CM

## 2024-01-18 DIAGNOSIS — I25.5 ISCHEMIC CARDIOMYOPATHY: Primary | ICD-10-CM

## 2024-01-18 DIAGNOSIS — I49.5 SINUS NODE DYSFUNCTION (HCC): ICD-10-CM

## 2024-01-18 DIAGNOSIS — E66.01 OBESITY, CLASS III, BMI 40-49.9 (MORBID OBESITY) (HCC): ICD-10-CM

## 2024-01-18 DIAGNOSIS — Z95.0 PACEMAKER: Primary | ICD-10-CM

## 2024-01-18 PROCEDURE — 1036F TOBACCO NON-USER: CPT

## 2024-01-18 PROCEDURE — 3077F SYST BP >= 140 MM HG: CPT

## 2024-01-18 PROCEDURE — 99214 OFFICE O/P EST MOD 30 MIN: CPT

## 2024-01-18 PROCEDURE — G8417 CALC BMI ABV UP PARAM F/U: HCPCS

## 2024-01-18 PROCEDURE — G8427 DOCREV CUR MEDS BY ELIG CLIN: HCPCS

## 2024-01-18 PROCEDURE — 1123F ACP DISCUSS/DSCN MKR DOCD: CPT

## 2024-01-18 PROCEDURE — G8484 FLU IMMUNIZE NO ADMIN: HCPCS

## 2024-01-18 PROCEDURE — 3017F COLORECTAL CA SCREEN DOC REV: CPT

## 2024-01-18 PROCEDURE — 1090F PRES/ABSN URINE INCON ASSESS: CPT

## 2024-01-18 PROCEDURE — G8400 PT W/DXA NO RESULTS DOC: HCPCS

## 2024-01-18 PROCEDURE — 3079F DIAST BP 80-89 MM HG: CPT

## 2024-01-18 RX ORDER — METOPROLOL SUCCINATE 100 MG/1
150 TABLET, EXTENDED RELEASE ORAL DAILY
Qty: 90 TABLET | Refills: 1 | Status: SHIPPED | OUTPATIENT
Start: 2024-01-18

## 2024-01-18 NOTE — PATIENT INSTRUCTIONS
1. Increase Toprol  mg daily  2.  Continue lisinopril 40 mg daily  3.  Continue Lipitor 40 mg nightly and aspirin 81 mg daily  4. See Lupe Hyman CNP for HF  5. Follow up with Dr Hart for device upgrade

## 2024-01-18 NOTE — TELEPHONE ENCOUNTER
Pt was scheduled with [facility] today. Npkk stated pt should have seen/needs to see AGK as soon as possible to upgrade device. Please provide a date and time as new pt with AGK, fmr rps pt. I informed the pt that our office will call her back with response.

## 2024-01-25 ENCOUNTER — OFFICE VISIT (OUTPATIENT)
Dept: CARDIOLOGY CLINIC | Age: 69
End: 2024-01-25

## 2024-01-25 ENCOUNTER — NURSE ONLY (OUTPATIENT)
Dept: CARDIOLOGY CLINIC | Age: 69
End: 2024-01-25

## 2024-01-25 ENCOUNTER — TELEPHONE (OUTPATIENT)
Dept: CARDIOLOGY CLINIC | Age: 69
End: 2024-01-25

## 2024-01-25 VITALS
HEIGHT: 61 IN | WEIGHT: 232 LBS | BODY MASS INDEX: 43.8 KG/M2 | OXYGEN SATURATION: 98 % | DIASTOLIC BLOOD PRESSURE: 86 MMHG | SYSTOLIC BLOOD PRESSURE: 138 MMHG | HEART RATE: 60 BPM

## 2024-01-25 DIAGNOSIS — I49.5 SINUS NODE DYSFUNCTION (HCC): ICD-10-CM

## 2024-01-25 DIAGNOSIS — Z95.0 PACEMAKER: Primary | ICD-10-CM

## 2024-01-25 DIAGNOSIS — I42.9 CARDIOMYOPATHY, UNSPECIFIED TYPE (HCC): ICD-10-CM

## 2024-01-25 DIAGNOSIS — I47.19 PAROXYSMAL ATRIAL TACHYCARDIA: ICD-10-CM

## 2024-01-25 NOTE — TELEPHONE ENCOUNTER
[facility]  EP Procedure Sheet    Brenda Mcleod  1955  [id number]  EP Procedures  [] Pacemaker implant (single/dual) [] EP Study   [] ICD implant (single/dual) [] Atrial flutter ablation (JOHN PAUL Y/N)   [] Biv implant ICD [] Tilt Table   [] Biv implant PPM [] Atrial fibrillation ablation (JOHN PAUL Yes)   [] Generator Change (PPM/ICD/BiV) [] SVT ablation   [] Lead revision (RV/LA/RA) (<1 month) [] PVC ablation     [x] Lead upgrade (BiV) [] VT Ischemic/ non-ischemic   [] Loop implant/ removal [] VT RVOT   [] Cardioversion [] VT Left sided   [] JOHN PAUL [] AVN ablation   Equipment  [x] [employer]  [] TYRELL Mapping System    [] Precision [] Ensite X   [] [employer] [] Carto Mapping System   [] [employer] [employer] [] CryoAblation   [] Biotronik [] Laser Lead Extraction   EP Procedures Scheduling Request  # hours Requested  []1 [x]2 []2-4 [] 4-6 Scheduled  Date:   Specific Day  Completed    Anesthesia [x]yes []no F/u Date:   CT surgery backup []yes [x]no Location []FF[x]AND   Overnight stay   []KW[]Ross Steel MD []RMM []MXA []MW  []UL []CMV  []WW [] RWH   []KA [] JMB [x] AJK  First vs repeat   []1st [] 2nd [] 3rd   Pre-Procedure Labs / Imaging  [] PT/INR [] Type & cross   [] CBC [] Units PRBC   [] BMP/Mg [] Units FFP   [] Venogram [] Cardiac CTA for Pulmonary vein mapping     RN INITIALS: SCRN    Patient Instructions  Dx: cardiomyopathy  ICD-10 code:   Medication Instructions: Hold []Xarelto []Eliquis []Coumadin []pradaxa for   Do not eat or drink after midnight the night prior to procedure [x] Yes [] No     Hold morning of procedure:  Terazosin

## 2024-01-25 NOTE — PROGRESS NOTES
[facility]   Electrophysiology Consult Note            Date: 1/25/24  Patient Name: Brenda Mcleod  YOB: 1955    Primary Care Physician: Luis Manuel Crocker MD    CHIEF COMPLAINT:   Chief Complaint   Patient presents with    Follow-up    Tachycardia     HISTORY OF PRESENT ILLNESS: Brenda Mcleod is a 68 y.o. female with a history of sinus node dysfunction, DC PPM, syncope, PAT, hypertension and ANNA  On 10/2017 patient was admitted to the hospital for syncope.  Echo revealed EF 55%.  She was discharged with a 30-day event monitor which showed multiple pauses.  She had 6.4-second pause with syncope.  On 11/2017 patient underwent implantation of dual-chamber pacemaker.  Patient had PAT during her hospitalization and was started on atenolol.  On 10/2/2023 patient was seen in office for sinus node dysfunction and PAT ECG revealed a PVP with heart rate of 60.  She did endorse some shortness of breath with exertion.   Today, 1/25/2024, EKG demonstrates SR 60 bpm. She reports that she is doing ok. She is taking her medications as prescribed. Patient denies current edema, chest pain, sob, palpitations, dizziness or syncope.     Past Medical History:   has a past medical history of HTN, SVT (supraventricular tachycardia), and Wrist fracture, closed, left, with routine healing, subsequent encounter.    Past Surgical History:   has a past surgical history that includes Breast biopsy (Right, 2002) and cyst removal (Right).     Allergies:  Chlorthalidone    Social History:   reports that she quit smoking about 37 years ago. Her smoking use included cigarettes. She started smoking about 48 years ago. She has a 32.2 pack-year smoking history. She has never used smokeless tobacco. She reports current alcohol use. She reports that she does not use drugs.     Family History: family history includes Hypertension in her father; Parkinsonism in her father.    Home Medications:    Prior to Admission

## 2024-01-25 NOTE — PATIENT INSTRUCTIONS
RECOMMENDATIONS:  Remote device checks every 3 months.  Discussed device upgrade to BiV-- involves placement of third wire.   Patient would like to proceed with this. We will call you to schedule.   Follow up after procedure.

## 2024-01-31 ENCOUNTER — TELEPHONE (OUTPATIENT)
Dept: FAMILY MEDICINE CLINIC | Age: 69
End: 2024-01-31

## 2024-01-31 NOTE — TELEPHONE ENCOUNTER
Called to schedule patients Medicare AW appointment phone picked up but then nothing unable to leave message

## 2024-02-02 ENCOUNTER — OFFICE VISIT (OUTPATIENT)
Dept: CARDIOLOGY CLINIC | Age: 69
End: 2024-02-02
Payer: MEDICARE

## 2024-02-02 VITALS
BODY MASS INDEX: 44.22 KG/M2 | OXYGEN SATURATION: 97 % | SYSTOLIC BLOOD PRESSURE: 148 MMHG | HEART RATE: 60 BPM | DIASTOLIC BLOOD PRESSURE: 72 MMHG | WEIGHT: 234.2 LBS | HEIGHT: 61 IN

## 2024-02-02 DIAGNOSIS — E66.01 OBESITY, CLASS III, BMI 40-49.9 (MORBID OBESITY) (HCC): ICD-10-CM

## 2024-02-02 DIAGNOSIS — I42.9 CARDIOMYOPATHY, UNSPECIFIED TYPE (HCC): ICD-10-CM

## 2024-02-02 DIAGNOSIS — I50.22 HEART FAILURE WITH MID-RANGE EJECTION FRACTION (HCC): Primary | ICD-10-CM

## 2024-02-02 DIAGNOSIS — I49.5 SINUS NODE DYSFUNCTION (HCC): ICD-10-CM

## 2024-02-02 PROBLEM — E66.813 OBESITY, CLASS III, BMI 40-49.9 (MORBID OBESITY): Status: ACTIVE | Noted: 2024-02-02

## 2024-02-02 PROCEDURE — 3077F SYST BP >= 140 MM HG: CPT | Performed by: NURSE PRACTITIONER

## 2024-02-02 PROCEDURE — 1090F PRES/ABSN URINE INCON ASSESS: CPT | Performed by: NURSE PRACTITIONER

## 2024-02-02 PROCEDURE — 1036F TOBACCO NON-USER: CPT | Performed by: NURSE PRACTITIONER

## 2024-02-02 PROCEDURE — 1123F ACP DISCUSS/DSCN MKR DOCD: CPT | Performed by: NURSE PRACTITIONER

## 2024-02-02 PROCEDURE — G8400 PT W/DXA NO RESULTS DOC: HCPCS | Performed by: NURSE PRACTITIONER

## 2024-02-02 PROCEDURE — G8427 DOCREV CUR MEDS BY ELIG CLIN: HCPCS | Performed by: NURSE PRACTITIONER

## 2024-02-02 PROCEDURE — 3078F DIAST BP <80 MM HG: CPT | Performed by: NURSE PRACTITIONER

## 2024-02-02 PROCEDURE — G8484 FLU IMMUNIZE NO ADMIN: HCPCS | Performed by: NURSE PRACTITIONER

## 2024-02-02 PROCEDURE — 99214 OFFICE O/P EST MOD 30 MIN: CPT | Performed by: NURSE PRACTITIONER

## 2024-02-02 PROCEDURE — G8417 CALC BMI ABV UP PARAM F/U: HCPCS | Performed by: NURSE PRACTITIONER

## 2024-02-02 PROCEDURE — 3017F COLORECTAL CA SCREEN DOC REV: CPT | Performed by: NURSE PRACTITIONER

## 2024-02-02 NOTE — PATIENT INSTRUCTIONS
Plan:   Check daily weights  Continue lisinopril 40mg daily   Continue toprol 150mg daily   Add jardiance 10mg daily   Check BMP in 2-3 weeks   Follow up 3 months

## 2024-02-02 NOTE — PROGRESS NOTES
Date Taking? Authorizing Provider   metoprolol succinate (TOPROL XL) 100 MG extended release tablet Take 1.5 tablets by mouth daily 1/18/24  Yes Iman Portillo APRN - CNP   atorvastatin (LIPITOR) 40 MG tablet TAKE (1) TABLET DAILY 1/4/24  Yes Luis Manuel Crocker MD   meloxicam (MOBIC) 7.5 MG tablet TAKE ONE (1) TABLET ONCE DAILY 1/4/24  Yes Luis Manuel Crocker MD   lisinopril (PRINIVIL;ZESTRIL) 40 MG tablet TAKE ONE (1) TABLET ONCE DAILY 1/4/24  Yes Luis Manuel Crocker MD   solifenacin (VESICARE) 5 MG tablet TAKE ONE (1) TABLET ONCE DAILY 12/22/23  Yes Luis Manuel Crocker MD   terazosin (HYTRIN) 5 MG capsule TAKE ONE (1) CAPSULE BY MOUTH ONCE NIGHTLY 10/4/23  Yes Luis Manuel Crocker MD   augmented betamethasone dipropionate (DIPROLENE AF) 0.05 % cream APPLY TWICE A DAY TO THE AFFECTED AREA(S) 2/14/23  Yes Luis Manuel Crocker MD   aspirin 81 MG EC tablet Take 1 tablet by mouth daily   Yes Provider, MD Pan      Allergies:  Chlorthalidone     Physical Examination:    Vitals:    02/02/24 1316 02/02/24 1321   BP: (!) 148/72 (!) 148/72   Pulse: 60    SpO2: 97%    Weight: 106.2 kg (234 lb 3.2 oz)    Height: 1.549 m (5' 1\")         Constitutional and General Appearance: no apparent distress  HEENT: non-icteric sclera, oropharynx without exudate, oral mucosa moist  Neck: JVP less than 8 cm H20  Respiratory:  No use of accessory muscles  Clear breath sounds throughout, no wheezing, no crackles, no rhonchi  Cardiovascular:  The apical impulses not displaced  Heart tones are crisp and normal, no murmur/rub/gallop  Regular rate and rhythm, S1,S2 normal  Radial pulses 2+ and equal bilaterally  No edema bilateral lower extremities  Pedal Pulses: 2+ and equal   Abdomen:  No masses or tenderness  Liver: No Abnormalities Noted  Musculoskeletal/Skin:  Exhibits normal gait balance and coordination  There is no clubbing, cyanosis of the extremities  Skin is warm and dry  Moves all extremities well  Neurological/Psychiatric:  Alert and

## 2024-02-16 ENCOUNTER — PATIENT MESSAGE (OUTPATIENT)
Dept: CARDIOLOGY CLINIC | Age: 69
End: 2024-02-16

## 2024-02-16 ENCOUNTER — HOSPITAL ENCOUNTER (OUTPATIENT)
Age: 69
Discharge: HOME | End: 2024-02-16
Payer: MEDICARE

## 2024-02-16 ENCOUNTER — TELEPHONE (OUTPATIENT)
Dept: CARDIOLOGY CLINIC | Age: 69
End: 2024-02-16

## 2024-02-16 DIAGNOSIS — I42.9 CARDIOMYOPATHY, UNSPECIFIED TYPE (HCC): ICD-10-CM

## 2024-02-16 LAB
ANION GAP SERPL CALCULATED.3IONS-SCNC: 10 MMOL/L (ref 3–16)
BUN SERPL-MCNC: 19 MG/DL (ref 7–20)
CALCIUM SERPL-MCNC: 9.8 MG/DL (ref 8.3–10.6)
CHLORIDE SERPL-SCNC: 110 MMOL/L (ref 99–110)
CO2 SERPL-SCNC: 25 MMOL/L (ref 21–32)
CREAT SERPL-MCNC: 0.8 MG/DL (ref 0.6–1.2)
GFR SERPLBLD CREATININE-BSD FMLA CKD-EPI: >60 ML/MIN/{1.73_M2}
GLUCOSE SERPL-MCNC: 131 MG/DL (ref 70–99)
POTASSIUM SERPL-SCNC: 3.6 MMOL/L (ref 3.5–5.1)
SODIUM SERPL-SCNC: 145 MMOL/L (ref 136–145)

## 2024-02-16 PROCEDURE — 80048 BASIC METABOLIC PNL TOTAL CA: CPT

## 2024-02-16 PROCEDURE — 36415 COLL VENOUS BLD VENIPUNCTURE: CPT

## 2024-02-16 NOTE — TELEPHONE ENCOUNTER
Thank you for update.  Please also let her know that her renal panel looks great.  Continue current medications and continue to monitor blood pressures at this time.  Keep follow-up

## 2024-02-16 NOTE — TELEPHONE ENCOUNTER
Patient called and gave her BP's and also stated that her Jardiance is going to be affordable.  It will be $47.00 per month!!    BP log    02/01/2024  152/76  02/06/2024  145/71  02/14/2024   156/73  02/16/2024   140/66

## 2024-03-03 ENCOUNTER — PATIENT MESSAGE (OUTPATIENT)
Dept: CARDIOLOGY CLINIC | Age: 69
End: 2024-03-03

## 2024-03-05 ENCOUNTER — NURSE ONLY (OUTPATIENT)
Dept: CARDIOLOGY CLINIC | Age: 69
End: 2024-03-05

## 2024-03-05 DIAGNOSIS — Z95.0 PACEMAKER: ICD-10-CM

## 2024-03-09 PROCEDURE — 93294 REM INTERROG EVL PM/LDLS PM: CPT | Performed by: INTERNAL MEDICINE

## 2024-03-09 PROCEDURE — 93296 REM INTERROG EVL PM/IDS: CPT | Performed by: INTERNAL MEDICINE

## 2024-03-11 NOTE — TELEPHONE ENCOUNTER
Spoke with pt. She would like the end of May. I'll call her toward the end of April if I haven't heard from her.

## 2024-03-29 RX ORDER — TERAZOSIN 5 MG/1
CAPSULE ORAL
Qty: 30 CAPSULE | Refills: 5 | Status: SHIPPED | OUTPATIENT
Start: 2024-03-29

## 2024-04-04 NOTE — PROGRESS NOTES
MA Communication:   The following orders are received by verbal communication from Micah Briceño MD    Orders include:  1 yr FU        Change CPAP pressure 5-15 Render In Strict Bullet Format?: No Detail Level: Zone Continue Regimen: minocycline 100 mg capsule BID\\nQuantity: 60.0 Capsule\\nSig: Take one pill twice daily with a full glass of water. Do not lie down 1 hour after taking.\\n\\ntretinoin 0.025 % topical cream \\nQuantity: 45.0 g  Days Supply: 30\\nSig: APPLY TO FACE QHS\\n\\nYAZ (28) 3 mg-0.02 mg tablet \\nQuantity: 28.0 Tablet\\nSig: Take 1 tab daily\\n\\nNeuac AM Initiate Treatment: pharmacy compounding accessory \\nQuantity: 30.0 g  Days Supply: 30\\nSig: HQ 8%, KOJIC ACID 2%, HYDROCORTISONE 2.5%, tranexamic acid 2%, APPLY TO AFFECTED AREAS 3x a week in the evening 30GM

## 2024-05-01 RX ORDER — MELOXICAM 7.5 MG/1
TABLET ORAL
Qty: 90 TABLET | Refills: 0 | Status: SHIPPED | OUTPATIENT
Start: 2024-05-01

## 2024-05-01 RX ORDER — LISINOPRIL 40 MG/1
TABLET ORAL
Qty: 90 TABLET | Refills: 0 | Status: SHIPPED | OUTPATIENT
Start: 2024-05-01

## 2024-05-03 ENCOUNTER — OFFICE VISIT (OUTPATIENT)
Dept: CARDIOLOGY CLINIC | Age: 69
End: 2024-05-03
Payer: MEDICARE

## 2024-05-03 ENCOUNTER — TELEPHONE (OUTPATIENT)
Dept: CARDIOLOGY CLINIC | Age: 69
End: 2024-05-03

## 2024-05-03 ENCOUNTER — HOSPITAL ENCOUNTER (OUTPATIENT)
Age: 69
Discharge: HOME | End: 2024-05-03
Payer: MEDICARE

## 2024-05-03 VITALS
HEART RATE: 62 BPM | WEIGHT: 231.2 LBS | BODY MASS INDEX: 43.65 KG/M2 | SYSTOLIC BLOOD PRESSURE: 128 MMHG | DIASTOLIC BLOOD PRESSURE: 66 MMHG | HEIGHT: 61 IN | OXYGEN SATURATION: 99 %

## 2024-05-03 DIAGNOSIS — I50.22 HEART FAILURE WITH MID-RANGE EJECTION FRACTION (HCC): Primary | ICD-10-CM

## 2024-05-03 DIAGNOSIS — Z95.0 PACEMAKER: ICD-10-CM

## 2024-05-03 DIAGNOSIS — R82.90 MALODOROUS URINE: ICD-10-CM

## 2024-05-03 LAB
BILIRUB UR QL STRIP.AUTO: NEGATIVE
CLARITY UR: CLEAR
COLOR UR: YELLOW
GLUCOSE UR STRIP.AUTO-MCNC: 500 MG/DL
HGB UR QL STRIP.AUTO: NEGATIVE
KETONES UR STRIP.AUTO-MCNC: NEGATIVE MG/DL
LEUKOCYTE ESTERASE UR QL STRIP.AUTO: NEGATIVE
NITRITE UR QL STRIP.AUTO: NEGATIVE
PH UR STRIP.AUTO: 6 [PH] (ref 5–8)
PROT UR STRIP.AUTO-MCNC: NEGATIVE MG/DL
SP GR UR STRIP.AUTO: <=1.005 (ref 1–1.03)
UA COMPLETE W REFLEX CULTURE PNL UR: ABNORMAL
UA DIPSTICK W REFLEX MICRO PNL UR: ABNORMAL
URN SPEC COLLECT METH UR: ABNORMAL
UROBILINOGEN UR STRIP-ACNC: 0.2 E.U./DL

## 2024-05-03 PROCEDURE — 81003 URINALYSIS AUTO W/O SCOPE: CPT

## 2024-05-03 PROCEDURE — 99214 OFFICE O/P EST MOD 30 MIN: CPT | Performed by: NURSE PRACTITIONER

## 2024-05-03 PROCEDURE — 1090F PRES/ABSN URINE INCON ASSESS: CPT | Performed by: NURSE PRACTITIONER

## 2024-05-03 PROCEDURE — 3017F COLORECTAL CA SCREEN DOC REV: CPT | Performed by: NURSE PRACTITIONER

## 2024-05-03 PROCEDURE — G8427 DOCREV CUR MEDS BY ELIG CLIN: HCPCS | Performed by: NURSE PRACTITIONER

## 2024-05-03 PROCEDURE — G8417 CALC BMI ABV UP PARAM F/U: HCPCS | Performed by: NURSE PRACTITIONER

## 2024-05-03 PROCEDURE — G8400 PT W/DXA NO RESULTS DOC: HCPCS | Performed by: NURSE PRACTITIONER

## 2024-05-03 PROCEDURE — 3074F SYST BP LT 130 MM HG: CPT | Performed by: NURSE PRACTITIONER

## 2024-05-03 PROCEDURE — 1036F TOBACCO NON-USER: CPT | Performed by: NURSE PRACTITIONER

## 2024-05-03 PROCEDURE — 3078F DIAST BP <80 MM HG: CPT | Performed by: NURSE PRACTITIONER

## 2024-05-03 PROCEDURE — 1123F ACP DISCUSS/DSCN MKR DOCD: CPT | Performed by: NURSE PRACTITIONER

## 2024-05-03 NOTE — PATIENT INSTRUCTIONS
Plan:   Check daily weights  Continue lisinopril 40mg daily   Continue toprol 150mg daily   Jardiance 10mg daily   Check UA today; may need to hold the jardiance if you have  UTI  Follow up 3 months

## 2024-05-03 NOTE — TELEPHONE ENCOUNTER
----- Message from Fabby Hyman, LUIZA - CNP sent at 5/3/2024 12:37 PM EDT -----  Reviewed, results expected with sugar in the urine ,but no signs of UTI!

## 2024-05-03 NOTE — PROGRESS NOTES
12/12/2023 107 99 - 110 mmol/L Final   11/22/2023 107 99 - 110 mmol/L Final     CO2   Date Value Ref Range Status   02/16/2024 25 21 - 32 mmol/L Final   12/12/2023 24 21 - 32 mmol/L Final   11/22/2023 24 21 - 32 mmol/L Final     BUN   Date Value Ref Range Status   02/16/2024 19 7 - 20 mg/dL Final   12/12/2023 14 7 - 20 mg/dL Final   11/22/2023 15 7 - 20 mg/dL Final     Creatinine   Date Value Ref Range Status   02/16/2024 0.8 0.6 - 1.2 mg/dL Final   12/12/2023 0.9 0.6 - 1.2 mg/dL Final   11/22/2023 0.9 0.6 - 1.2 mg/dL Final     BNP: No results found for: \"PROBNP\"  Lipids:   Cholesterol, Total   Date Value Ref Range Status   12/12/2023 165 0 - 199 mg/dL Final                   Triglycerides   Date Value Ref Range Status   12/12/2023 114 0 - 150 mg/dL Final                   HDL   Date Value Ref Range Status   12/12/2023 57 40 - 60 mg/dL Final     Comment:     An HDL cholesterol less than 40 mg/dL is low and  constitutes a coronary heart disease risk factor.  An HDL cholesterol greater than 60 mg/dL is a  negative risk factor for coronary heart disease.                     No components found for: \"LDLCHOLESTEROL\", \"LDLCALC\"                  VLDL Cholesterol Calculated   Date Value Ref Range Status   12/12/2023 23 Not Established mg/dL Final                 No results found for: \"CHOLHDLRATIO\"    EF:   Lab Results   Component Value Date/Time    LVEF 55 10/16/2017 01:59 PM       Testing reviewed:      Cardiac cath 12/12/2023  Impression:  1. Mild-moderate non-obstructive coronary artery disease.  2. Non-ischemic cardiomyopathy.  3. Normal LVEDP.     Coronary anatomy:  A. Left main artery: The left main artery bifurcates into the left anterior descending artery and left circumflex artery.  The left main artery has no angiographically significant disease.  B. Left anterior descending artery: Transapical vessel which gives rise to 2 diagonal arteries.  The LAD has a 40% mid-vessel stenosis immediately after the

## 2024-05-07 NOTE — TELEPHONE ENCOUNTER
Patient needs to see EP (either) Per VSP.  Thank you
Pt scheduled with rps on 11/13/17
Xray Pelvis AP only

## 2024-05-10 RX ORDER — EMPAGLIFLOZIN 10 MG/1
10 TABLET, FILM COATED ORAL DAILY
Qty: 30 TABLET | Refills: 3 | Status: SHIPPED | OUTPATIENT
Start: 2024-05-10

## 2024-05-19 ASSESSMENT — PATIENT HEALTH QUESTIONNAIRE - PHQ9
1. LITTLE INTEREST OR PLEASURE IN DOING THINGS: NOT AT ALL
SUM OF ALL RESPONSES TO PHQ9 QUESTIONS 1 & 2: 0
SUM OF ALL RESPONSES TO PHQ9 QUESTIONS 1 & 2: 0
2. FEELING DOWN, DEPRESSED OR HOPELESS: NOT AT ALL
SUM OF ALL RESPONSES TO PHQ QUESTIONS 1-9: 0
2. FEELING DOWN, DEPRESSED OR HOPELESS: NOT AT ALL
1. LITTLE INTEREST OR PLEASURE IN DOING THINGS: NOT AT ALL

## 2024-05-22 ENCOUNTER — OFFICE VISIT (OUTPATIENT)
Dept: FAMILY MEDICINE CLINIC | Age: 69
End: 2024-05-22
Payer: MEDICARE

## 2024-05-22 ENCOUNTER — ANESTHESIA EVENT (OUTPATIENT)
Dept: CARDIAC CATH/INVASIVE PROCEDURES | Age: 69
End: 2024-05-22
Payer: MEDICARE

## 2024-05-22 VITALS
WEIGHT: 228 LBS | OXYGEN SATURATION: 98 % | HEART RATE: 60 BPM | TEMPERATURE: 97.5 F | BODY MASS INDEX: 43.08 KG/M2 | SYSTOLIC BLOOD PRESSURE: 143 MMHG | DIASTOLIC BLOOD PRESSURE: 79 MMHG

## 2024-05-22 DIAGNOSIS — I49.5 SINUS NODE DYSFUNCTION (HCC): ICD-10-CM

## 2024-05-22 DIAGNOSIS — I50.22 HEART FAILURE WITH MID-RANGE EJECTION FRACTION (HCC): Primary | ICD-10-CM

## 2024-05-22 DIAGNOSIS — I10 ESSENTIAL HYPERTENSION: ICD-10-CM

## 2024-05-22 DIAGNOSIS — R73.9 HYPERGLYCEMIA: ICD-10-CM

## 2024-05-22 DIAGNOSIS — Z95.0 PACEMAKER: ICD-10-CM

## 2024-05-22 PROBLEM — E11.9 TYPE 2 DIABETES MELLITUS (HCC): Status: ACTIVE | Noted: 2024-05-22

## 2024-05-22 PROBLEM — E11.9 TYPE 2 DIABETES MELLITUS (HCC): Status: RESOLVED | Noted: 2024-05-22 | Resolved: 2024-05-22

## 2024-05-22 PROCEDURE — G8427 DOCREV CUR MEDS BY ELIG CLIN: HCPCS | Performed by: FAMILY MEDICINE

## 2024-05-22 PROCEDURE — 3017F COLORECTAL CA SCREEN DOC REV: CPT | Performed by: FAMILY MEDICINE

## 2024-05-22 PROCEDURE — 1090F PRES/ABSN URINE INCON ASSESS: CPT | Performed by: FAMILY MEDICINE

## 2024-05-22 PROCEDURE — G8400 PT W/DXA NO RESULTS DOC: HCPCS | Performed by: FAMILY MEDICINE

## 2024-05-22 PROCEDURE — 1123F ACP DISCUSS/DSCN MKR DOCD: CPT | Performed by: FAMILY MEDICINE

## 2024-05-22 PROCEDURE — 3078F DIAST BP <80 MM HG: CPT | Performed by: FAMILY MEDICINE

## 2024-05-22 PROCEDURE — 99214 OFFICE O/P EST MOD 30 MIN: CPT | Performed by: FAMILY MEDICINE

## 2024-05-22 PROCEDURE — 3077F SYST BP >= 140 MM HG: CPT | Performed by: FAMILY MEDICINE

## 2024-05-22 PROCEDURE — G8417 CALC BMI ABV UP PARAM F/U: HCPCS | Performed by: FAMILY MEDICINE

## 2024-05-22 PROCEDURE — 1036F TOBACCO NON-USER: CPT | Performed by: FAMILY MEDICINE

## 2024-05-22 NOTE — PATIENT INSTRUCTIONS
Please read the healthy family handout that you were given and share it with your family.       Please compare this printed medication list with your medications at home to be sure they are the same.  If you have any medications that are different please contact us immediately at [phone].     Also review your allergies that we have listed, these may also include medications that you have not been able to tolerate, make sure everything listed is correct. If you have any allergies that are different please contact us immediately at [phone].     You may receive a survey in the mail or by email asking about your experience during your visit today. Please complete and return to us so we know how we are serving you.    
spontaneous/unlabored

## 2024-05-22 NOTE — PROGRESS NOTES
Subjective:  Brenda Mcleod is here to discuss the following issues.  She has a long history of reduced systolic function with EF approximately 45%.  About 6 months ago metoprolol was increased.  More recently Jardiance was added.  This has been very well-tolerated.  She also continues on aspirin Lipitor and lisinopril.  She has hypertension but blood pressures have consistently been well-controlled.  She also takes Hytrin for this.  No recent chest pain pressure lightheadedness diaphoresis.  She has sinus node dysfunction and a pacemaker and will have this upgraded to 3 leads with a new device tomorrow.  She will spend at least 1 night in the hospital recovering.  She has hyperglycemia with no polydipsia or polyuria and no diagnosis of diabetes.  Social History     Tobacco Use   Smoking Status Former    Current packs/day: 0.00    Average packs/day: 3.0 packs/day for 10.7 years (32.2 ttl pk-yrs)    Types: Cigarettes    Start date: 6/3/1975    Quit date: 3/2/1986    Years since quittin.2   Smokeless Tobacco Never   Allergies:     Chlorthalidone    Objective:  BP (!) 143/79   Pulse 60   Temp 97.5 °F (36.4 °C) (Oral)   Wt 103.4 kg (228 lb)   SpO2 98%   BMI 43.08 kg/m²    No acute distress, heart regular rate and rhythm without murmur, lungs clear to auscultation easy effort, abdomen nondistended, no clubbing or cyanosis    Assessment:  1. Heart failure with mid-range ejection fraction (HCC)    2. Sinus node dysfunction (HCC)    3. Pacemaker    4. Hyperglycemia    5. Essential hypertension            Plan:  Continue current medications  Tomorrow she will have a 3rd-lead and new pacemaker placed  Continue current medications including recently started Jardiance  She has not been holding aspirin or meloxicam but I advised her to skip these tomorrow morning  Her daughter Iman's divorce is final  Follow-up in 6 months or as needed  The diagnoses listed in the assessment above are stable unless otherwise 
2017 00:08

## 2024-05-23 ENCOUNTER — NURSE ONLY (OUTPATIENT)
Dept: CARDIOLOGY CLINIC | Age: 69
End: 2024-05-23

## 2024-05-23 ENCOUNTER — HOSPITAL ENCOUNTER (OUTPATIENT)
Age: 69
Setting detail: OBSERVATION
Discharge: HOME | End: 2024-05-24
Attending: INTERNAL MEDICINE | Admitting: INTERNAL MEDICINE
Payer: MEDICARE

## 2024-05-23 ENCOUNTER — ANESTHESIA (OUTPATIENT)
Dept: CARDIAC CATH/INVASIVE PROCEDURES | Age: 69
End: 2024-05-23
Payer: MEDICARE

## 2024-05-23 DIAGNOSIS — Z95.0 CARDIAC RESYNCHRONIZATION THERAPY PACEMAKER (CRT-P) IN PLACE: Primary | ICD-10-CM

## 2024-05-23 DIAGNOSIS — Z95.0 PACEMAKER: ICD-10-CM

## 2024-05-23 DIAGNOSIS — G89.18 POST-OP PAIN: Primary | ICD-10-CM

## 2024-05-23 DIAGNOSIS — I42.9 PRIMARY CARDIOMYOPATHY (HCC): ICD-10-CM

## 2024-05-23 PROBLEM — I42.8 NICM (NONISCHEMIC CARDIOMYOPATHY) (HCC): Status: ACTIVE | Noted: 2024-05-23

## 2024-05-23 LAB
ANION GAP SERPL CALCULATED.3IONS-SCNC: 10 MMOL/L (ref 3–16)
BUN SERPL-MCNC: 19 MG/DL (ref 7–20)
CALCIUM SERPL-MCNC: 9.6 MG/DL (ref 8.3–10.6)
CHLORIDE SERPL-SCNC: 107 MMOL/L (ref 99–110)
CO2 SERPL-SCNC: 24 MMOL/L (ref 21–32)
CREAT SERPL-MCNC: 0.9 MG/DL (ref 0.6–1.2)
DEPRECATED RDW RBC AUTO: 13.6 % (ref 12.4–15.4)
ECHO BSA: 2.11 M2
GFR SERPLBLD CREATININE-BSD FMLA CKD-EPI: 70 ML/MIN/{1.73_M2}
GLUCOSE SERPL-MCNC: 94 MG/DL (ref 70–99)
HCT VFR BLD AUTO: 46.9 % (ref 36–48)
HGB BLD-MCNC: 15.7 G/DL (ref 12–16)
MCH RBC QN AUTO: 30.6 PG (ref 26–34)
MCHC RBC AUTO-ENTMCNC: 33.5 G/DL (ref 31–36)
MCV RBC AUTO: 91.4 FL (ref 80–100)
PLATELET # BLD AUTO: 309 K/UL (ref 135–450)
PMV BLD AUTO: 8.5 FL (ref 5–10.5)
POTASSIUM SERPL-SCNC: 3.7 MMOL/L (ref 3.5–5.1)
RBC # BLD AUTO: 5.13 M/UL (ref 4–5.2)
SODIUM SERPL-SCNC: 141 MMOL/L (ref 136–145)
WBC # BLD AUTO: 7.8 K/UL (ref 4–11)

## 2024-05-23 PROCEDURE — 6360000002 HC RX W HCPCS: Performed by: NURSE ANESTHETIST, CERTIFIED REGISTERED

## 2024-05-23 PROCEDURE — 6360000002 HC RX W HCPCS

## 2024-05-23 PROCEDURE — 2500000003 HC RX 250 WO HCPCS: Performed by: INTERNAL MEDICINE

## 2024-05-23 PROCEDURE — 33229 REMV&REPLC PM GEN MULT LEADS: CPT | Performed by: INTERNAL MEDICINE

## 2024-05-23 PROCEDURE — C1769 GUIDE WIRE: HCPCS | Performed by: INTERNAL MEDICINE

## 2024-05-23 PROCEDURE — C1892 INTRO/SHEATH,FIXED,PEEL-AWAY: HCPCS | Performed by: INTERNAL MEDICINE

## 2024-05-23 PROCEDURE — 2720000010 HC SURG SUPPLY STERILE: Performed by: INTERNAL MEDICINE

## 2024-05-23 PROCEDURE — C1900 LEAD, CORONARY VENOUS: HCPCS | Performed by: INTERNAL MEDICINE

## 2024-05-23 PROCEDURE — C1730 CATH, EP, 19 OR FEW ELECT: HCPCS | Performed by: INTERNAL MEDICINE

## 2024-05-23 PROCEDURE — C1887 CATHETER, GUIDING: HCPCS | Performed by: INTERNAL MEDICINE

## 2024-05-23 PROCEDURE — 2500000003 HC RX 250 WO HCPCS: Performed by: NURSE ANESTHETIST, CERTIFIED REGISTERED

## 2024-05-23 PROCEDURE — 2500000003 HC RX 250 WO HCPCS

## 2024-05-23 PROCEDURE — 85027 COMPLETE CBC AUTOMATED: CPT

## 2024-05-23 PROCEDURE — C2621 PMKR, OTHER THAN SING/DUAL: HCPCS | Performed by: INTERNAL MEDICINE

## 2024-05-23 PROCEDURE — 33225 L VENTRIC PACING LEAD ADD-ON: CPT | Performed by: INTERNAL MEDICINE

## 2024-05-23 PROCEDURE — 7100000001 HC PACU RECOVERY - ADDTL 15 MIN: Performed by: INTERNAL MEDICINE

## 2024-05-23 PROCEDURE — G0378 HOSPITAL OBSERVATION PER HR: HCPCS

## 2024-05-23 PROCEDURE — 3700000000 HC ANESTHESIA ATTENDED CARE: Performed by: INTERNAL MEDICINE

## 2024-05-23 PROCEDURE — 7100000000 HC PACU RECOVERY - FIRST 15 MIN: Performed by: INTERNAL MEDICINE

## 2024-05-23 PROCEDURE — 80048 BASIC METABOLIC PNL TOTAL CA: CPT

## 2024-05-23 PROCEDURE — 2580000003 HC RX 258: Performed by: INTERNAL MEDICINE

## 2024-05-23 PROCEDURE — C1894 INTRO/SHEATH, NON-LASER: HCPCS | Performed by: INTERNAL MEDICINE

## 2024-05-23 PROCEDURE — 6370000000 HC RX 637 (ALT 250 FOR IP): Performed by: INTERNAL MEDICINE

## 2024-05-23 PROCEDURE — 2709999900 HC NON-CHARGEABLE SUPPLY: Performed by: INTERNAL MEDICINE

## 2024-05-23 PROCEDURE — C1889 IMPLANT/INSERT DEVICE, NOC: HCPCS | Performed by: INTERNAL MEDICINE

## 2024-05-23 PROCEDURE — 3700000001 HC ADD 15 MINUTES (ANESTHESIA): Performed by: INTERNAL MEDICINE

## 2024-05-23 DEVICE — CRTP W4TR01 PERCEPTA QUAD CRTP MRI US
Type: IMPLANTABLE DEVICE | Status: FUNCTIONAL
Brand: PERCEPTA™ QUAD CRT-P MRI SURESCAN™

## 2024-05-23 DEVICE — ENVELOPE CMRM6133 ABSORB LRG MR
Type: IMPLANTABLE DEVICE | Status: FUNCTIONAL
Brand: TYRX™

## 2024-05-23 DEVICE — LEAD 479888 ATTAIN MRI US
Type: IMPLANTABLE DEVICE | Status: FUNCTIONAL
Brand: ATTAIN STABILITY™ QUAD MRI SURESCAN™

## 2024-05-23 RX ORDER — ONDANSETRON 2 MG/ML
INJECTION INTRAMUSCULAR; INTRAVENOUS PRN
Status: DISCONTINUED | OUTPATIENT
Start: 2024-05-23 | End: 2024-05-23 | Stop reason: SDUPTHER

## 2024-05-23 RX ORDER — PROPOFOL 10 MG/ML
INJECTION, EMULSION INTRAVENOUS PRN
Status: DISCONTINUED | OUTPATIENT
Start: 2024-05-23 | End: 2024-05-23 | Stop reason: SDUPTHER

## 2024-05-23 RX ORDER — SODIUM CHLORIDE 0.9 % (FLUSH) 0.9 %
5-40 SYRINGE (ML) INJECTION EVERY 12 HOURS SCHEDULED
Status: DISCONTINUED | OUTPATIENT
Start: 2024-05-23 | End: 2024-05-23 | Stop reason: HOSPADM

## 2024-05-23 RX ORDER — SODIUM CHLORIDE 0.9 % (FLUSH) 0.9 %
5-40 SYRINGE (ML) INJECTION EVERY 12 HOURS SCHEDULED
Status: DISCONTINUED | OUTPATIENT
Start: 2024-05-23 | End: 2024-05-24 | Stop reason: HOSPADM

## 2024-05-23 RX ORDER — SODIUM CHLORIDE 9 MG/ML
INJECTION, SOLUTION INTRAVENOUS PRN
Status: DISCONTINUED | OUTPATIENT
Start: 2024-05-23 | End: 2024-05-23 | Stop reason: HOSPADM

## 2024-05-23 RX ORDER — SODIUM CHLORIDE 0.9 % (FLUSH) 0.9 %
5-40 SYRINGE (ML) INJECTION PRN
Status: DISCONTINUED | OUTPATIENT
Start: 2024-05-23 | End: 2024-05-23 | Stop reason: HOSPADM

## 2024-05-23 RX ORDER — OXYCODONE HYDROCHLORIDE 5 MG/1
5 TABLET ORAL PRN
Status: DISCONTINUED | OUTPATIENT
Start: 2024-05-23 | End: 2024-05-23 | Stop reason: HOSPADM

## 2024-05-23 RX ORDER — SODIUM CHLORIDE 9 MG/ML
INJECTION, SOLUTION INTRAVENOUS PRN
Status: DISCONTINUED | OUTPATIENT
Start: 2024-05-23 | End: 2024-05-24 | Stop reason: HOSPADM

## 2024-05-23 RX ORDER — LABETALOL HYDROCHLORIDE 5 MG/ML
5 INJECTION, SOLUTION INTRAVENOUS EVERY 10 MIN PRN
Status: DISCONTINUED | OUTPATIENT
Start: 2024-05-23 | End: 2024-05-23 | Stop reason: HOSPADM

## 2024-05-23 RX ORDER — ATORVASTATIN CALCIUM 40 MG/1
40 TABLET, FILM COATED ORAL DAILY
Status: DISCONTINUED | OUTPATIENT
Start: 2024-05-23 | End: 2024-05-24 | Stop reason: HOSPADM

## 2024-05-23 RX ORDER — ONDANSETRON 2 MG/ML
4 INJECTION INTRAMUSCULAR; INTRAVENOUS
Status: DISCONTINUED | OUTPATIENT
Start: 2024-05-23 | End: 2024-05-23 | Stop reason: HOSPADM

## 2024-05-23 RX ORDER — NOREPINEPHRINE BITARTRATE 1 MG/ML
INJECTION, SOLUTION INTRAVENOUS PRN
Status: DISCONTINUED | OUTPATIENT
Start: 2024-05-23 | End: 2024-05-23 | Stop reason: SDUPTHER

## 2024-05-23 RX ORDER — METOPROLOL SUCCINATE 50 MG/1
150 TABLET, EXTENDED RELEASE ORAL DAILY
Status: DISCONTINUED | OUTPATIENT
Start: 2024-05-23 | End: 2024-05-24 | Stop reason: HOSPADM

## 2024-05-23 RX ORDER — DOXAZOSIN 2 MG/1
1 TABLET ORAL DAILY
Status: DISCONTINUED | OUTPATIENT
Start: 2024-05-23 | End: 2024-05-24 | Stop reason: HOSPADM

## 2024-05-23 RX ORDER — SODIUM CHLORIDE 0.9 % (FLUSH) 0.9 %
5-40 SYRINGE (ML) INJECTION PRN
Status: DISCONTINUED | OUTPATIENT
Start: 2024-05-23 | End: 2024-05-24 | Stop reason: HOSPADM

## 2024-05-23 RX ORDER — LABETALOL HYDROCHLORIDE 5 MG/ML
INJECTION, SOLUTION INTRAVENOUS PRN
Status: DISCONTINUED | OUTPATIENT
Start: 2024-05-23 | End: 2024-05-23 | Stop reason: SDUPTHER

## 2024-05-23 RX ORDER — FENTANYL CITRATE 50 UG/ML
INJECTION, SOLUTION INTRAMUSCULAR; INTRAVENOUS PRN
Status: DISCONTINUED | OUTPATIENT
Start: 2024-05-23 | End: 2024-05-23 | Stop reason: SDUPTHER

## 2024-05-23 RX ORDER — DEXAMETHASONE SODIUM PHOSPHATE 4 MG/ML
INJECTION, SOLUTION INTRA-ARTICULAR; INTRALESIONAL; INTRAMUSCULAR; INTRAVENOUS; SOFT TISSUE PRN
Status: DISCONTINUED | OUTPATIENT
Start: 2024-05-23 | End: 2024-05-23 | Stop reason: SDUPTHER

## 2024-05-23 RX ORDER — VANCOMYCIN HYDROCHLORIDE 1 G/20ML
INJECTION, POWDER, LYOPHILIZED, FOR SOLUTION INTRAVENOUS PRN
Status: DISCONTINUED | OUTPATIENT
Start: 2024-05-23 | End: 2024-05-23 | Stop reason: SDUPTHER

## 2024-05-23 RX ORDER — LISINOPRIL 20 MG/1
40 TABLET ORAL DAILY
Status: DISCONTINUED | OUTPATIENT
Start: 2024-05-23 | End: 2024-05-24 | Stop reason: HOSPADM

## 2024-05-23 RX ORDER — ASPIRIN 81 MG/1
81 TABLET ORAL DAILY
Status: DISCONTINUED | OUTPATIENT
Start: 2024-05-23 | End: 2024-05-24 | Stop reason: HOSPADM

## 2024-05-23 RX ORDER — OXYCODONE HYDROCHLORIDE 5 MG/1
10 TABLET ORAL PRN
Status: DISCONTINUED | OUTPATIENT
Start: 2024-05-23 | End: 2024-05-23 | Stop reason: HOSPADM

## 2024-05-23 RX ORDER — MEPERIDINE HYDROCHLORIDE 50 MG/ML
12.5 INJECTION INTRAMUSCULAR; INTRAVENOUS; SUBCUTANEOUS EVERY 5 MIN PRN
Status: DISCONTINUED | OUTPATIENT
Start: 2024-05-23 | End: 2024-05-23 | Stop reason: HOSPADM

## 2024-05-23 RX ORDER — LIDOCAINE HYDROCHLORIDE 20 MG/ML
INJECTION, SOLUTION INFILTRATION; PERINEURAL PRN
Status: DISCONTINUED | OUTPATIENT
Start: 2024-05-23 | End: 2024-05-23 | Stop reason: SDUPTHER

## 2024-05-23 RX ORDER — ACETAMINOPHEN 325 MG/1
650 TABLET ORAL EVERY 4 HOURS PRN
Status: DISCONTINUED | OUTPATIENT
Start: 2024-05-23 | End: 2024-05-24 | Stop reason: HOSPADM

## 2024-05-23 RX ORDER — DOXYCYCLINE HYCLATE 100 MG
100 TABLET ORAL EVERY 12 HOURS SCHEDULED
Status: DISCONTINUED | OUTPATIENT
Start: 2024-05-23 | End: 2024-05-24 | Stop reason: HOSPADM

## 2024-05-23 RX ORDER — DIPHENHYDRAMINE HYDROCHLORIDE 50 MG/ML
12.5 INJECTION INTRAMUSCULAR; INTRAVENOUS
Status: DISCONTINUED | OUTPATIENT
Start: 2024-05-23 | End: 2024-05-23 | Stop reason: HOSPADM

## 2024-05-23 RX ORDER — NALOXONE HYDROCHLORIDE 0.4 MG/ML
INJECTION, SOLUTION INTRAMUSCULAR; INTRAVENOUS; SUBCUTANEOUS PRN
Status: DISCONTINUED | OUTPATIENT
Start: 2024-05-23 | End: 2024-05-23 | Stop reason: HOSPADM

## 2024-05-23 RX ORDER — OXYCODONE HYDROCHLORIDE AND ACETAMINOPHEN 5; 325 MG/1; MG/1
1 TABLET ORAL EVERY 4 HOURS PRN
Status: DISCONTINUED | OUTPATIENT
Start: 2024-05-23 | End: 2024-05-24 | Stop reason: HOSPADM

## 2024-05-23 RX ORDER — TROSPIUM CHLORIDE 20 MG/1
20 TABLET, FILM COATED ORAL NIGHTLY
Status: DISCONTINUED | OUTPATIENT
Start: 2024-05-23 | End: 2024-05-24 | Stop reason: HOSPADM

## 2024-05-23 RX ORDER — LIDOCAINE HYDROCHLORIDE AND EPINEPHRINE 5; 5 MG/ML; UG/ML
INJECTION, SOLUTION INFILTRATION; PERINEURAL PRN
Status: DISCONTINUED | OUTPATIENT
Start: 2024-05-23 | End: 2024-05-23 | Stop reason: HOSPADM

## 2024-05-23 RX ORDER — ROCURONIUM BROMIDE 10 MG/ML
INJECTION, SOLUTION INTRAVENOUS PRN
Status: DISCONTINUED | OUTPATIENT
Start: 2024-05-23 | End: 2024-05-23 | Stop reason: SDUPTHER

## 2024-05-23 RX ADMIN — OXYCODONE AND ACETAMINOPHEN 1 TABLET: 5; 325 TABLET ORAL at 20:59

## 2024-05-23 RX ADMIN — PROPOFOL 25 MG: 10 INJECTION, EMULSION INTRAVENOUS at 15:43

## 2024-05-23 RX ADMIN — ATORVASTATIN CALCIUM 40 MG: 40 TABLET, FILM COATED ORAL at 17:50

## 2024-05-23 RX ADMIN — METOPROLOL SUCCINATE 150 MG: 50 TABLET, EXTENDED RELEASE ORAL at 17:50

## 2024-05-23 RX ADMIN — LIDOCAINE HYDROCHLORIDE 100 MG: 20 INJECTION, SOLUTION INFILTRATION; PERINEURAL at 13:57

## 2024-05-23 RX ADMIN — VANCOMYCIN HYDROCHLORIDE 1500 MG: 1 INJECTION, POWDER, LYOPHILIZED, FOR SOLUTION INTRAVENOUS at 14:12

## 2024-05-23 RX ADMIN — NOREPINEPHRINE BITARTRATE 3.2 MCG: 1 INJECTION INTRAVENOUS at 14:22

## 2024-05-23 RX ADMIN — Medication 10 ML: at 20:57

## 2024-05-23 RX ADMIN — ACETAMINOPHEN 650 MG: 325 TABLET ORAL at 18:06

## 2024-05-23 RX ADMIN — TROSPIUM CHLORIDE 20 MG: 20 TABLET, FILM COATED ORAL at 20:57

## 2024-05-23 RX ADMIN — PROPOFOL 120 MG: 10 INJECTION, EMULSION INTRAVENOUS at 13:57

## 2024-05-23 RX ADMIN — ASPIRIN 81 MG: 81 TABLET, COATED ORAL at 17:50

## 2024-05-23 RX ADMIN — DOXYCYCLINE HYCLATE 100 MG: 100 TABLET, COATED ORAL at 20:57

## 2024-05-23 RX ADMIN — NOREPINEPHRINE BITARTRATE 3.2 MCG: 1 INJECTION INTRAVENOUS at 14:55

## 2024-05-23 RX ADMIN — LABETALOL HYDROCHLORIDE 10 MG: 5 INJECTION, SOLUTION INTRAVENOUS at 15:53

## 2024-05-23 RX ADMIN — DEXAMETHASONE SODIUM PHOSPHATE 8 MG: 4 INJECTION, SOLUTION INTRAMUSCULAR; INTRAVENOUS at 14:13

## 2024-05-23 RX ADMIN — FENTANYL CITRATE 50 MCG: 50 INJECTION, SOLUTION INTRAMUSCULAR; INTRAVENOUS at 15:52

## 2024-05-23 RX ADMIN — SODIUM CHLORIDE: 9 INJECTION, SOLUTION INTRAVENOUS at 13:07

## 2024-05-23 RX ADMIN — ROCURONIUM BROMIDE 5 MG: 50 INJECTION, SOLUTION INTRAVENOUS at 13:57

## 2024-05-23 RX ADMIN — PROPOFOL 25 MG: 10 INJECTION, EMULSION INTRAVENOUS at 15:51

## 2024-05-23 RX ADMIN — ROCURONIUM BROMIDE 45 MG: 50 INJECTION, SOLUTION INTRAVENOUS at 13:58

## 2024-05-23 RX ADMIN — LISINOPRIL 40 MG: 20 TABLET ORAL at 17:50

## 2024-05-23 RX ADMIN — PROPOFOL 25 MG: 10 INJECTION, EMULSION INTRAVENOUS at 15:50

## 2024-05-23 RX ADMIN — SODIUM CHLORIDE: 9 INJECTION, SOLUTION INTRAVENOUS at 13:46

## 2024-05-23 RX ADMIN — SUGAMMADEX 200 MG: 100 INJECTION, SOLUTION INTRAVENOUS at 15:56

## 2024-05-23 RX ADMIN — ONDANSETRON 4 MG: 2 INJECTION INTRAMUSCULAR; INTRAVENOUS at 14:13

## 2024-05-23 RX ADMIN — FENTANYL CITRATE 50 MCG: 50 INJECTION, SOLUTION INTRAMUSCULAR; INTRAVENOUS at 13:57

## 2024-05-23 RX ADMIN — EMPAGLIFLOZIN 10 MG: 10 TABLET, FILM COATED ORAL at 17:50

## 2024-05-23 RX ADMIN — NOREPINEPHRINE BITARTRATE 3.2 MCG: 1 INJECTION INTRAVENOUS at 14:31

## 2024-05-23 RX ADMIN — DOXAZOSIN 1 MG: 2 TABLET ORAL at 17:50

## 2024-05-23 RX ADMIN — PROPOFOL 25 MG: 10 INJECTION, EMULSION INTRAVENOUS at 15:40

## 2024-05-23 RX ADMIN — PROPOFOL 30 MG: 10 INJECTION, EMULSION INTRAVENOUS at 15:37

## 2024-05-23 ASSESSMENT — PAIN SCALES - GENERAL
PAINLEVEL_OUTOF10: 4
PAINLEVEL_OUTOF10: 0
PAINLEVEL_OUTOF10: 5
PAINLEVEL_OUTOF10: 2
PAINLEVEL_OUTOF10: 3

## 2024-05-23 ASSESSMENT — PAIN - FUNCTIONAL ASSESSMENT
PAIN_FUNCTIONAL_ASSESSMENT: ACTIVITIES ARE NOT PREVENTED
PAIN_FUNCTIONAL_ASSESSMENT: ACTIVITIES ARE NOT PREVENTED

## 2024-05-23 ASSESSMENT — PAIN DESCRIPTION - LOCATION
LOCATION: HEAD
LOCATION: OTHER (COMMENT)
LOCATION: HEAD

## 2024-05-23 ASSESSMENT — PAIN DESCRIPTION - DESCRIPTORS
DESCRIPTORS: ACHING

## 2024-05-23 ASSESSMENT — PAIN DESCRIPTION - ORIENTATION: ORIENTATION: LEFT

## 2024-05-23 NOTE — ANESTHESIA PRE PROCEDURE
Department of Anesthesiology  Preprocedure Note       Name:  Brenda Mcleod   Age:  68 y.o.  :  1955                                          MRN:  [id number]         Date:  2024      Surgeon: Surgeon(s):  QUOC Hart Jr., MD    Procedure: Procedure(s):  Remove & replace ICD biv multi leads  Lv lead placement    Medications prior to admission:   Prior to Admission medications    Medication Sig Start Date End Date Taking? Authorizing Provider   JARDIANCE 10 MG tablet TAKE ONE (1) TABLET BY MOUTH DAILY 5/10/24   Fabby Hyman APRN - CNP   lisinopril (PRINIVIL;ZESTRIL) 40 MG tablet TAKE ONE (1) TABLET ONCE DAILY 24   Luis Manuel Crocker MD   meloxicam (MOBIC) 7.5 MG tablet TAKE ONE (1) TABLET ONCE DAILY 24   Luis Manuel Crocker MD   terazosin (HYTRIN) 5 MG capsule TAKE ONE (1) CAPSULE BY MOUTH ONCE NIGHTLY 3/29/24   Luis Manuel Crocker MD   metoprolol succinate (TOPROL XL) 100 MG extended release tablet Take 1.5 tablets by mouth daily 24   Iman Portillo APRN - CNP   atorvastatin (LIPITOR) 40 MG tablet TAKE (1) TABLET DAILY 24   Luis Manuel Crocker MD   solifenacin (VESICARE) 5 MG tablet TAKE ONE (1) TABLET ONCE DAILY 23   Luis Manuel Crocker MD   augmented betamethasone dipropionate (DIPROLENE AF) 0.05 % cream APPLY TWICE A DAY TO THE AFFECTED AREA(S)  Patient not taking: Reported on 5/3/2024 2/14/23   Luis Manuel Crocker MD   aspirin 81 MG EC tablet Take 1 tablet by mouth daily    Provider, MD Pan       Current medications:    No current facility-administered medications for this encounter.     Current Outpatient Medications   Medication Sig Dispense Refill    JARDIANCE 10 MG tablet TAKE ONE (1) TABLET BY MOUTH DAILY 30 tablet 3    lisinopril (PRINIVIL;ZESTRIL) 40 MG tablet TAKE ONE (1) TABLET ONCE DAILY 90 tablet 0    meloxicam (MOBIC) 7.5 MG tablet TAKE ONE (1) TABLET ONCE DAILY 90 tablet 0    terazosin (HYTRIN) 5 MG capsule TAKE ONE (1) CAPSULE BY MOUTH ONCE NIGHTLY

## 2024-05-23 NOTE — H&P
[facility]   Cardiology Admission Note              Date:   5/23/2024  Patient name: Brenda Mcleod  Date of admission:  5/23/2024 10:45 AM  MRN:   [id number]  YOB: 1955    Primary Care physician: Luis Manuel Crocker MD    Reason for Admission:  cardiomyopathy    CHIEF COMPLAINT:  NICM     History Obtained From:  patient    HISTORY OF PRESENT ILLNESS:    Mrs. Mcleod is a pleasant 68 year old male with a medical history significant for sick sinus syndrome status post DC pacemaker (2017), non-ischemic cardiomyopathy with mildly depressed LVEF, atrial tachycardia, hypertension, and obstructive sleep apnea who presents from for BiV pacemaker upgrade for suspected RV paced cardiomyopathy.      Past Medical History:   has a past medical history of HTN, SVT (supraventricular tachycardia) (HCC), and Wrist fracture, closed, left, with routine healing, subsequent encounter.    Past Surgical History:   has a past surgical history that includes Breast biopsy (Right, 2002) and cyst removal (Right).     Home Medications:    Prior to Admission medications    Medication Sig Start Date End Date Taking? Authorizing Provider   JARDIANCE 10 MG tablet TAKE ONE (1) TABLET BY MOUTH DAILY 5/10/24   Fabby Hyman APRN - CNP   lisinopril (PRINIVIL;ZESTRIL) 40 MG tablet TAKE ONE (1) TABLET ONCE DAILY 5/1/24   Luis Manuel Crocker MD   meloxicam (MOBIC) 7.5 MG tablet TAKE ONE (1) TABLET ONCE DAILY 5/1/24   Luis Manuel Crocker MD   terazosin (HYTRIN) 5 MG capsule TAKE ONE (1) CAPSULE BY MOUTH ONCE NIGHTLY 3/29/24   Luis Manuel Crocker MD   metoprolol succinate (TOPROL XL) 100 MG extended release tablet Take 1.5 tablets by mouth daily 1/18/24   Iman Portillo APRN - CNP   atorvastatin (LIPITOR) 40 MG tablet TAKE (1) TABLET DAILY 1/4/24   Luis Manuel Crocker MD   solifenacin (VESICARE) 5 MG tablet TAKE ONE (1) TABLET ONCE DAILY 12/22/23   Luis Manuel Crocker MD   augmented betamethasone dipropionate (DIPROLENE AF) 0.05

## 2024-05-23 NOTE — EC ADMISSION CRITERIA
4 Eyes Skin Assessment     The patient is being assess for  Admission    I agree that 2 RN's have performed a thorough Head to Toe Skin Assessment on the patient. ALL assessment sites listed below have been assessed.       Areas assessed by both nurses: Merna SINGH & Justine RN  [x]   Head, Face, and Ears   [x]   Shoulders, Back, and Chest  [x]   Arms, Elbows, and Hands   [x]   Coccyx, Sacrum, and Ischum  [x]   Legs, Feet, and Heels        Does the Patient have Skin Breakdown?  No         Kevyn Prevention initiated:  No   Wound Care Orders initiated:  No      C nurse consulted for Pressure Injury (Stage 3,4, Unstageable, DTI, NWPT, and Complex wounds):  No      Nurse 1 eSignature: Electronically signed by Merna Monte RN on 5/23/24 at 6:44 PM EDT    **SHARE this note so that the co-signing nurse is able to place an eSignature**    Nurse 2 eSignature: Electronically signed by Juli Robertson RN on 5/23/24 at 6:52 PM EDT

## 2024-05-23 NOTE — PROGRESS NOTES
Upgrade to BIV PM/Lead     DEVICE MODEL  [id number] Percepta™ Quad CRT-P MRI  DEVICE SERIAL NUMBER  [id number]  DEVICE TYPE  Pacemaker  DATE OF IMPLANT  23-May-2024  Monitor Information  MONITOR STATUS  Transmission Received  MONITOR MODEL  Smart Phone or Tablet  EMAIL  [email or web address]  Verified: 23-May-2024 5:07 PM  PHONE  Patient must verify mobile phone number  MESSAGE PREFERENCES  Last update:   23-May-2024 5:07 PM  Email:   ON  Text Message:   OFF  Viviane Notifications:   ON

## 2024-05-23 NOTE — EC ADMISSION CRITERIA
Patient admitted to room 213 from PACU.  Patient oriented to room, call light, bed rails, phone, lights and bathroom.  Patient instructed about the schedule of the day including: vital sign frequency, lab draws, possible tests, frequency of MD and staff rounds, including RN/MD rounding together at bedside, daily weights, and I &O's.  Patient instructed about prescribed diet, how to use 8MENU, and television.  No bed alarm in place, patient is Aox4 and will call for help.  Telemetry box in place, patient aware of placement and reason.  Bed locked, in lowest position, side rails up 2/4, call light within reach.  Will continue to monitor.

## 2024-05-23 NOTE — PROGRESS NOTES
D: Patient here from OR via stretcher, taken to bay 9, all current drips, treatments, skin issues, and plan of care were reviewed by both RN, patient transferred in stable condition, patient is awake, alert, and oriented x 4, denies pain at present, call light is in reach. A: Assessment completed and documented, discussed plan of care with patient who agreed.

## 2024-05-24 ENCOUNTER — APPOINTMENT (OUTPATIENT)
Dept: GENERAL RADIOLOGY | Age: 69
End: 2024-05-24
Attending: INTERNAL MEDICINE
Payer: MEDICARE

## 2024-05-24 VITALS
HEART RATE: 64 BPM | SYSTOLIC BLOOD PRESSURE: 130 MMHG | RESPIRATION RATE: 17 BRPM | HEIGHT: 61 IN | BODY MASS INDEX: 42.97 KG/M2 | TEMPERATURE: 97.7 F | WEIGHT: 227.6 LBS | OXYGEN SATURATION: 95 % | DIASTOLIC BLOOD PRESSURE: 69 MMHG

## 2024-05-24 LAB
ANION GAP SERPL CALCULATED.3IONS-SCNC: 13 MMOL/L (ref 3–16)
BUN SERPL-MCNC: 17 MG/DL (ref 7–20)
CALCIUM SERPL-MCNC: 9.5 MG/DL (ref 8.3–10.6)
CHLORIDE SERPL-SCNC: 110 MMOL/L (ref 99–110)
CO2 SERPL-SCNC: 19 MMOL/L (ref 21–32)
CREAT SERPL-MCNC: 0.8 MG/DL (ref 0.6–1.2)
DEPRECATED RDW RBC AUTO: 13.6 % (ref 12.4–15.4)
GFR SERPLBLD CREATININE-BSD FMLA CKD-EPI: 81 ML/MIN/{1.73_M2}
GLUCOSE SERPL-MCNC: 118 MG/DL (ref 70–99)
HCT VFR BLD AUTO: 45.4 % (ref 36–48)
HGB BLD-MCNC: 15.1 G/DL (ref 12–16)
MAGNESIUM SERPL-MCNC: 2.1 MG/DL (ref 1.8–2.4)
MCH RBC QN AUTO: 30.6 PG (ref 26–34)
MCHC RBC AUTO-ENTMCNC: 33.2 G/DL (ref 31–36)
MCV RBC AUTO: 92.1 FL (ref 80–100)
PLATELET # BLD AUTO: 268 K/UL (ref 135–450)
PMV BLD AUTO: 8.4 FL (ref 5–10.5)
POTASSIUM SERPL-SCNC: 4.2 MMOL/L (ref 3.5–5.1)
RBC # BLD AUTO: 4.93 M/UL (ref 4–5.2)
SODIUM SERPL-SCNC: 142 MMOL/L (ref 136–145)
WBC # BLD AUTO: 11.4 K/UL (ref 4–11)

## 2024-05-24 PROCEDURE — 36415 COLL VENOUS BLD VENIPUNCTURE: CPT

## 2024-05-24 PROCEDURE — 85027 COMPLETE CBC AUTOMATED: CPT

## 2024-05-24 PROCEDURE — 99239 HOSP IP/OBS DSCHRG MGMT >30: CPT | Performed by: NURSE PRACTITIONER

## 2024-05-24 PROCEDURE — G0378 HOSPITAL OBSERVATION PER HR: HCPCS

## 2024-05-24 PROCEDURE — 71046 X-RAY EXAM CHEST 2 VIEWS: CPT

## 2024-05-24 PROCEDURE — 6370000000 HC RX 637 (ALT 250 FOR IP): Performed by: INTERNAL MEDICINE

## 2024-05-24 PROCEDURE — 80048 BASIC METABOLIC PNL TOTAL CA: CPT

## 2024-05-24 PROCEDURE — 83735 ASSAY OF MAGNESIUM: CPT

## 2024-05-24 PROCEDURE — 2580000003 HC RX 258: Performed by: INTERNAL MEDICINE

## 2024-05-24 RX ORDER — DOXYCYCLINE HYCLATE 100 MG
100 TABLET ORAL 2 TIMES DAILY
Qty: 8 TABLET | Refills: 0 | Status: SHIPPED | OUTPATIENT
Start: 2024-05-24 | End: 2024-05-28

## 2024-05-24 RX ORDER — OXYCODONE HYDROCHLORIDE AND ACETAMINOPHEN 5; 325 MG/1; MG/1
1 TABLET ORAL EVERY 4 HOURS PRN
Qty: 12 TABLET | Refills: 0 | Status: SHIPPED | OUTPATIENT
Start: 2024-05-24 | End: 2024-05-27

## 2024-05-24 RX ADMIN — METOPROLOL SUCCINATE 150 MG: 50 TABLET, EXTENDED RELEASE ORAL at 09:41

## 2024-05-24 RX ADMIN — ASPIRIN 81 MG: 81 TABLET, COATED ORAL at 09:41

## 2024-05-24 RX ADMIN — DOXAZOSIN 1 MG: 2 TABLET ORAL at 09:40

## 2024-05-24 RX ADMIN — ATORVASTATIN CALCIUM 40 MG: 40 TABLET, FILM COATED ORAL at 09:40

## 2024-05-24 RX ADMIN — LISINOPRIL 40 MG: 20 TABLET ORAL at 09:41

## 2024-05-24 RX ADMIN — ACETAMINOPHEN 650 MG: 325 TABLET ORAL at 04:46

## 2024-05-24 RX ADMIN — EMPAGLIFLOZIN 10 MG: 10 TABLET, FILM COATED ORAL at 09:40

## 2024-05-24 RX ADMIN — Medication 10 ML: at 09:41

## 2024-05-24 RX ADMIN — DOXYCYCLINE HYCLATE 100 MG: 100 TABLET, COATED ORAL at 09:41

## 2024-05-24 ASSESSMENT — PAIN SCALES - GENERAL
PAINLEVEL_OUTOF10: 0
PAINLEVEL_OUTOF10: 4
PAINLEVEL_OUTOF10: 4

## 2024-05-24 ASSESSMENT — PAIN DESCRIPTION - DESCRIPTORS: DESCRIPTORS: ACHING

## 2024-05-24 ASSESSMENT — PAIN DESCRIPTION - LOCATION
LOCATION: HEAD
LOCATION: HEAD

## 2024-05-24 NOTE — DISCHARGE SUMMARY
Patient ID:  Brenda Mcleod  [id number]  68 y.o.  1955    Admit date: 5/23/2024    Discharge date and time: 5/24/2024    Admitting Physician: QUOC Hart Jr., MD     Discharge NP: Yany Reynolds CNP    Admission Diagnoses: Primary cardiomyopathy (HCC) [I42.9]  NICM (nonischemic cardiomyopathy) (HCC) [I42.8]    Discharge Diagnoses: SAME    Admission Condition: fair    Discharged Condition: good    Hospital Course:  Brenda Mcleod was admitted on 5/23/2024 and had an upgrade from a dual chamber to CRT-P due to pacing induced cardiomyopathy. Device check was normal and CXR was normal from a device standpoint. Rhythm has been sinus with . She complains of some discomfort at implant site. Denies chest pain, palpitations, shortness of breath, and dizziness.     Assessment:   Sinus node dysfunction: stable               -s/p dual chamber pacemaker implant 11/2017 with upgrade to BiV pacemaker 5/23/2024              -device check and CXR reviewed   Nonischemic cardiomyopathy, secondary to persistent RV pacing    -EF 40-45% on echo 10/2023   -device upgrade as above  HFrEF: compensated   PAT: stable   HTN: controlled   HLD   ANNA   Arthritis     Plan:   Continue ASA, Lipitor, Cardura, Jardiance, lisinopril, and Toprol   Doxycycline 100 mg PO BID x 5 days post procedure   Post procedure instructions reviewed   Device check in one week  Follow up in office in three months    Patient was seen outside of global device window for HTN and CHF      Discharge Exam:  BP (!) 166/75   Pulse 72   Temp 97.5 °F (36.4 °C) (Oral)   Resp 16   Ht 1.549 m (5' 1\")   Wt 103.2 kg (227 lb 9.6 oz)   SpO2 100%   BMI 43.00 kg/m²     General Appearance:    Alert, cooperative, no distress, appears stated age   Head:    Normocephalic, without obvious abnormality, atraumatic   Eyes:    PERRL, conjunctiva/corneas clear      Ears:    deferred   Nose:   Nares normal, septum midline, mucosa normal, no drainage  or sinus tenderness

## 2024-05-24 NOTE — PROGRESS NOTES
5/24/2024 POST OP CHECK/REP      Changes This Session     EGM 3 Source Can to Sekou (FRANNY) Can to RVring    See MURJ report under cardiology tab once EP reviews.

## 2024-05-24 NOTE — CARE COORDINATION
Spoke with RN who states patient will likely discharge today.  Spoke with patient at bedside.  She lives at home with her daughter and grandchildren.  She is independent at home, works and drives.  She denies any DME or services at home.   She has insurance and a PCP.  She denies any needs from CM.

## 2024-05-24 NOTE — DISCHARGE INSTRUCTIONS
Pacemaker Discharge Instructions.    Brenda Mcleod IS A68 y.o.female  [unfilled]  [id number]  DATE 05/24/24     You can ride in a car, but no driving for 24 hours   Do not raise left arm above your heart for 2 weeks.  Resume regular activities in 24 hours.  Return to work/ school in 24 hours.    DRESSING:  Keep dressing dry & intact for 24 hours.  ___ Do not remove dressing until seen in office.  ___ Outer dressing may be removed after 24 hours.           Do not remove inner dressing until follow-up        appointment with your Doctor.(Leave steri strips intact.)  Ice pack to pacer site as needed for pain next 24 hours.    DIET:  Resume previous diet.  SPECIAL INSTRUCTIONS:   Report any of the following to physician or 911:  Any difficulty breathing, change in heart rhythm, change in level or consciousness or alertness, fever or chills.  Smart amount of bruising and drainage can be expected.  Any questions or concerns please contact your doctor.    SEDATION DISCHARGE INSTRUCTION:  For the next 24 hours do not drive a car, operate machinery, power tools or kitchen appliances.  Do not drink alcohol; including beer or wine.  Do not make any important decisions or sign any important papers.  For the next 24 hours you can expect drowsiness, light-headed or dizziness, nausea/ vomiting, inability to concentrate, fatigue and desire to sleep.  We strongly suggest that a responsible adult be with for the next 24 hours, for your protection and safety.  You are not allowed to drive yourself home, or take any type of public transportation.  If any questions, please call your doctor.  If you cannot reach your Doctor then call the Emergency Department at  [phone].  Explain to the Emergency Department the procedure you had performed and they will be able to assist you.  If you seek Emergency Care, bring this form with you.    Doctor*** would like to see you in ***.  Please call *** and set up this appointment as soon as

## 2024-05-24 NOTE — ANESTHESIA POSTPROCEDURE EVALUATION
Department of Anesthesiology  Postprocedure Note    Patient: Brenda Mcleod  MRN: [id number]  YOB: 1955  Date of evaluation: 5/23/2024    Procedure Summary       Date: 05/23/24 Room / Location: [facility] CATH/EP LAB 4 / [facility] CARDIAC CATH LAB    Anesthesia Start: 1346 Anesthesia Stop: 1608    Procedures:       Remove & replace ICD biv multi leads      Lv lead placement Diagnosis:       Primary cardiomyopathy (HCC)      (Primary cardiomyopathy (HCC) [I42.9])    Providers: QUOC Hart Jr., MD Responsible Provider: Ren Guthrie MD    Anesthesia Type: general ASA Status: 3            Anesthesia Type: No value filed.    Fouzia Phase I: Fouzia Score: 9    Fouzia Phase II:      Anesthesia Post Evaluation    Comments: Postoperative Anesthesia Note    Name:    Brenda Mcleod  MRN:      [id number]    Patient Vitals in the past 12 hrs:  05/23/24 1945, BP:(!) 140/59, Temp:97.7 °F (36.5 °C), Temp src:Oral, Pulse:68, Resp:17, SpO2:97 %  05/23/24 1700, BP:(!) 145/69, Temp:97.5 °F (36.4 °C), Temp src:Oral, Pulse:63, Resp:19, SpO2:96 %  05/23/24 1645, BP:(!) 154/59, Pulse:60, Resp:20, SpO2:97 %  05/23/24 1640, BP:(!) 149/46, Pulse:77, Resp:20, SpO2:99 %  05/23/24 1635, Pulse:69, SpO2:100 %  05/23/24 1630, BP:(!) 143/61, Pulse:100, Resp:20, SpO2:99 %  05/23/24 1625, BP:(!) 141/57, Pulse:96, Resp:20, SpO2:98 %  05/23/24 1620, Pulse:62, SpO2:100 %  05/23/24 1615, BP:(!) 127/48, Pulse:61, Resp:20, SpO2:99 %  05/23/24 1612, BP:(!) 134/44, Temp:97.1 °F (36.2 °C), Temp src:Temporal, Pulse:60, Resp:20, SpO2:97 %  05/23/24 1610, BP:(!) 134/44, Pulse:71, Resp:20, SpO2:97 %  05/23/24 1100, Height:1.549 m (5' 1\"), Weight:103.2 kg (227 lb 9.6 oz)     LABS:    CBC  Lab Results       Component                Value               Date/Time                  WBC                      7.8                 05/23/2024 11:20 AM        HGB                      15.7                05/23/2024 11:20 AM        HCT                      46.9

## 2024-05-24 NOTE — PROGRESS NOTES
Pt discharged.  Removed IV and stopped bleeding.  Catheter intact. Pt tolerated well. No redness noted at site.  Notified CMU and removed tele box. Reviewed d/c instructions, home meds, and  f/u information utilizing teach-back method.  Scripts for Percocet given to patient. Patient verbalized understanding.

## 2024-05-24 NOTE — PLAN OF CARE
Problem: Chronic Conditions and Co-morbidities  Goal: Patient's chronic conditions and co-morbidity symptoms are monitored and maintained or improved  5/24/2024 1230 by Fabby Jacobo RN  Outcome: Progressing     Problem: Safety - Adult  Goal: Free from fall injury  5/24/2024 1230 by Fabby Jacobo, RN  Outcome: Progressing  Pt high fall risk. Instructed to use call light before getting out of bed. Call light within reach. Bed in low position.  Will continue to monitor.       Problem: Pain  Goal: Verbalizes/displays adequate comfort level or baseline comfort level  5/24/2024 1230 by Fabby Jacobo, RN  Outcome: Progressing   Pt will be satisfied with pain control. Pt uses numeric pain rating scale with reassessments after pain med administration. Will continue to monitor progression throughout shift.

## 2024-05-28 ENCOUNTER — CARE COORDINATION (OUTPATIENT)
Dept: CASE MANAGEMENT | Age: 69
End: 2024-05-28

## 2024-05-28 DIAGNOSIS — I42.8 NICM (NONISCHEMIC CARDIOMYOPATHY) (HCC): Primary | ICD-10-CM

## 2024-05-28 PROCEDURE — 1111F DSCHRG MED/CURRENT MED MERGE: CPT | Performed by: FAMILY MEDICINE

## 2024-05-28 NOTE — CARE COORDINATION
Care Transitions Note    Initial Call - Call within 2 business days of discharge: Yes     Patient Current Location:  [place]    Care Transition Nurse contacted the patient by telephone to perform post hospital discharge assessment, verified name and  as identifiers. Provided introduction to self, and explanation of the Care Transition Nurse role.     Patient: Brenda Mcleod    Patient : 1955   MRN: [id number]    Reason for Admission: NICM remove replace ICD leads   Discharge Date: 24  RURS: No data recorded    Last Discharge Facility       Date Complaint Diagnosis Description Type Department Provider    24  Post-op pain ... Admission (Discharged) MHAZ A2 QUOC Hart Jr., MD            Was this an external facility discharge? No    Additional needs identified to be addressed with provider   No needs identified             Method of communication with provider: none.    Patients top risk factors for readmission: functional physical ability and medical condition-.    Interventions to address risk factors:   Education: .  Review of patient management of conditions/medications: .    Care Summary Note: CTN spoke with patient who reported she is doing alright but sore. Patient reported her incision site is CD&I however she has bruising on her breast dark purple. CTN encouraged to continue to monitor and if bruising spreads to notify provider. CTN discussed post op instructions and patient verbalized understanding. CTN reviewed all medications with patient who reported she is taking all as prescribed. Denies any acute needs at present time.  Agreeable to f/u calls.  Educated on the use of urgent care or physician’s 24 hr access line if assistance is needed after hours.     Care Transition Nurse reviewed discharge instructions, medical action plan, and red flags with patient. The patient was given an opportunity to ask questions; all questions answered at this time.. The patient verbalized

## 2024-05-31 ENCOUNTER — CARE COORDINATION (OUTPATIENT)
Dept: CASE MANAGEMENT | Age: 69
End: 2024-05-31

## 2024-05-31 ENCOUNTER — OFFICE VISIT (OUTPATIENT)
Dept: FAMILY MEDICINE CLINIC | Age: 69
End: 2024-05-31
Payer: MEDICARE

## 2024-05-31 VITALS
SYSTOLIC BLOOD PRESSURE: 132 MMHG | BODY MASS INDEX: 43.31 KG/M2 | DIASTOLIC BLOOD PRESSURE: 82 MMHG | WEIGHT: 229.2 LBS | HEART RATE: 72 BPM | OXYGEN SATURATION: 97 %

## 2024-05-31 DIAGNOSIS — Z09 HOSPITAL DISCHARGE FOLLOW-UP: Primary | ICD-10-CM

## 2024-05-31 DIAGNOSIS — I50.22 HEART FAILURE WITH MID-RANGE EJECTION FRACTION (HCC): ICD-10-CM

## 2024-05-31 PROCEDURE — 3079F DIAST BP 80-89 MM HG: CPT

## 2024-05-31 PROCEDURE — 99213 OFFICE O/P EST LOW 20 MIN: CPT

## 2024-05-31 PROCEDURE — 1123F ACP DISCUSS/DSCN MKR DOCD: CPT

## 2024-05-31 PROCEDURE — G8427 DOCREV CUR MEDS BY ELIG CLIN: HCPCS

## 2024-05-31 PROCEDURE — 3075F SYST BP GE 130 - 139MM HG: CPT

## 2024-05-31 PROCEDURE — 1036F TOBACCO NON-USER: CPT

## 2024-05-31 PROCEDURE — 1111F DSCHRG MED/CURRENT MED MERGE: CPT

## 2024-05-31 PROCEDURE — 1090F PRES/ABSN URINE INCON ASSESS: CPT

## 2024-05-31 PROCEDURE — 3017F COLORECTAL CA SCREEN DOC REV: CPT

## 2024-05-31 PROCEDURE — G8400 PT W/DXA NO RESULTS DOC: HCPCS

## 2024-05-31 PROCEDURE — G8417 CALC BMI ABV UP PARAM F/U: HCPCS

## 2024-05-31 NOTE — CARE COORDINATION
Care Transitions Note    Follow Up Call      Patient Current Location:  [place]    Care Transition Nurse contacted the patient by telephone. Verified name and  as identifiers.    Additional needs identified to be addressed with provider   No needs identified                 Method of communication with provider: none.    Care Summary Note: CTN spoke with patient who reported she is doing alright and just had follow up with PCP. Patient reported she still has bruising around pacemaker site but denied any worsening. Patient has device check on 6/3. Denies any acute needs at present time.  Agreeable to f/u calls.  Educated on the use of urgent care or physician’s 24 hr access line if assistance is needed after hours.    Plan of care updates since last contact:  Review of patient management of conditions/medications: .       Advance Care Planning:   Does patient have an Advance Directive: reviewed during previous call, see note. .    Medication Review:  No changes since last call.     Remote Patient Monitoring:  Offered patient enrollment in the Remote Patient Monitoring (RPM) program for in-home monitoring: discuss on follow up    Assessments:  Care Transitions Subsequent and Final Call    Subsequent and Final Calls  Do you have any ongoing symptoms?: No  Have your medications changed?: No  Do you have any questions related to your medications?: No  Do you currently have any active services?: No  Do you have any needs or concerns that I can assist you with?: No  Identified Barriers: None  Care Transitions Interventions  Other Interventions:              Follow Up Appointment:   QUE appointment attended as scheduled   Future Appointments         Provider Specialty Dept Phone    6/3/2024 10:00 AM MARTINA ULLOA DEVICE CHECK Cardiology [phone]    2024 10:30 AM Fabby Hyman, APRN - CNP Cardiology [phone]    2024 10:40 AM Issa Newman MD Pulmonology [phone]    2024 3:00 PM

## 2024-05-31 NOTE — PROGRESS NOTES
Post-Discharge Transitional Care  Follow Up      Brenda Mcleod   YOB: 1955    Date of Office Visit:  5/31/2024  Date of Hospital Admission: 5/23/24  Date of Hospital Discharge: 5/24/24  Risk of hospital readmission (high >=14%. Medium >=10%) :No data recorded    Care management risk score Rising risk (score 2-5) and Complex Care (Scores >=6): No Risk Score On File     Non face to face  following discharge, date last encounter closed (first attempt may have been earlier): 05/28/2024    Call initiated 2 business days of discharge: Yes    ASSESSMENT/PLAN:   Hospital discharge follow-up  Patient presents today for hospital follow-up.  Patient was hospitalized at [facility] on 5/23/2024 through 5/24/2024 for pacemaker exchange.  Patient denies any acute problems or concerns today.  Physical exam revealed Steri-Strips to left chest with extensive ecchymosis to left breast.  No concerns for infection at this time.  Patient is scheduled for follow-up next week with cardiology for device check.  -     DE DISCHARGE MEDS RECONCILED W/ CURRENT OUTPATIENT MED LIST  Heart failure with mid-range ejection fraction (HCC)  See above     Medical Decision Making: straightforward  Return in 3 months (on 8/31/2024).           Subjective:   HPI:  Follow up of Hospital problems/diagnosis(es): Patient presents today for hospital follow-up.  Patient was hospitalized at [facility] from 5/23/2024 through 5/20/2024.  Patient had a pacemaker upgrade from a dual to a CRT-P / 3 wire due to cardiomyopathy.  Patient was discharged on 5 days of doxycycline.  Patient is scheduled for device check in 1 week and 3-month follow-up with cardiology.    Inpatient course: Discharge summary reviewed- see chart.    Interval history/Current status: Today patient reports she is doing well.  Incision is healing well.  Patient reports some extensive ecchymosis to left breast however no concerns for infection.  Steri-Strips are still in

## 2024-06-03 ENCOUNTER — NURSE ONLY (OUTPATIENT)
Dept: CARDIOLOGY CLINIC | Age: 69
End: 2024-06-03

## 2024-06-03 DIAGNOSIS — Z95.0 CARDIAC RESYNCHRONIZATION THERAPY PACEMAKER (CRT-P) IN PLACE: ICD-10-CM

## 2024-06-03 NOTE — PROGRESS NOTES
See MURJ report under cardiology tab once EP reviews.    Rep check.    Changes This Session   Activity Acceleration 30 s 15 s  Patient ID 06/13/19... 1955  Date of Birth [birth year]  Lead 3 Model [id number] Att...  Lead 3 MR Conditional Unknown Yes  Lead 3 Position CS  Lead 3 Length (cm) 88  Lead 3  Medtronic  Lead 3 Serial Number [id number]...  Lead 3 Implant Date May/23/2024

## 2024-06-03 NOTE — PATIENT INSTRUCTIONS
New Cardiac Device Implant (Pacemaker and/or Defibrillator) Post Op Instructions  Bathe with water indirectly hitting the incision site. Water and soap may run over the incision site. Do not scrub. Pat dry with a clean towel after bathing.   Leave incision open to the air; do not apply any dressings, ointments, or bandages to the area. Do not apply lotion, perfume/cologne, or powders to the area until it is completely healed.   Any scabbing or skin glue that is noted will fall off within 1-2 weeks after the post op appointment.   If any oozing, bleeding, or pus drainage occurs, please call the office immediately at [phone].       Patient has movement restrictions in place until 4 weeks post op (to the day of implant) unless otherwise instructed by physician.   Patient may not lift the device side arm above shoulder height.   Do not far reach or stretch across body or behind body with effected side.   Do not use this arm for pushing, pulling, or lifting body.   Do not use cane on the effected side.   Patient may not lift anything heavier than a gallon of milk with the associated arm.     Appointments to expect going forward:  Post operatively the patient will have had a 1-week post op check, a 1 month follow up with NP/MD, and a 3 month follow up with NP/MD and the device clinic.       Remote Monitoring Instructions    Within 2-3 weeks of your device being implanted, you will receive a call from the  of your device. Please answer this call as it is to set up remote monitoring for your device. Once you receive your in-home monitor, please follow the instructions provided to sync the home monitor to your implanted device. Once you have paired your home monitor to your implanted device, keep your monitor plugged in within 6 feet of where you sleep. Your monitor will routinely check in with your device during sleep hours and transmit any urgent events to the Device Clinic for review.     Please do not

## 2024-06-03 NOTE — PROGRESS NOTES
Device check completed by medtronic rep.     Site check:   Incision is clean, and dry with all dressings/steri strips removed. Site left open to the air.  No s/s of infection. Patient educated on post op instructions and given print out education for post op care.      ** small open area noted once steri strips removed. Reviewed with AGK who advised leaving site MELISSA and for patient to return on Thursday 6/6 for re check. Patient V/U and scheduled accordingly.

## 2024-06-06 ENCOUNTER — NURSE ONLY (OUTPATIENT)
Dept: CARDIOLOGY CLINIC | Age: 69
End: 2024-06-06

## 2024-06-06 NOTE — PROGRESS NOTES
Patient presents to the Device Clinic for a repeat site check per Dr. Hart's request. Incision is closed, clean, and dry. Site left open to the air. Incision well approximated. No s/s of infection. F/u as scheduled. Patient vu.

## 2024-06-07 ENCOUNTER — CARE COORDINATION (OUTPATIENT)
Dept: CASE MANAGEMENT | Age: 69
End: 2024-06-07

## 2024-06-07 NOTE — CARE COORDINATION
Attempted to reach patient for transitions of care follow up.  Unable to reach patient.      Outreach Attempts:   1ST CTC attempt to reach Pt regarding recent hospital discharge.  CTC left voice recording with call back number requesting a call back. Will attempt to reach patient again.    Follow Up Appointment:   Future Appointments         Provider Specialty Dept Phone    8/2/2024 10:30 AM Fabby Hyman, APRN - [facility] Cardiology [phone]    8/27/2024 10:40 AM Issa Newman MD Pulmonology [phone]    8/29/2024 3:00 PM SCHEDULE, MARTINA DEVICE CHECK Cardiology [phone]    8/29/2024 3:00 PM Iman Portillo APRN - CNP Cardiology [phone]    11/20/2024 12:00 PM Luis Manuel Crocker MD Family Medicine [phone]            Plan for follow-up call in 6-10 days based on severity of symptoms and risk factors. Plan for next call: symptom management-any ongoing swelling, bruising to PPM site.  RPM Program, per previous CTN Note.    Thank You,    Abbey Guzmán RN  Care Transition Coordinator  Contact Number:[phone]

## 2024-06-10 ENCOUNTER — PATIENT MESSAGE (OUTPATIENT)
Dept: CARDIOLOGY CLINIC | Age: 69
End: 2024-06-10

## 2024-06-10 RX ORDER — ATORVASTATIN CALCIUM 40 MG/1
TABLET, FILM COATED ORAL
Qty: 30 TABLET | Refills: 5 | Status: SHIPPED | OUTPATIENT
Start: 2024-06-10

## 2024-06-10 NOTE — TELEPHONE ENCOUNTER
Refill Request     CONFIRM preferrred pharmacy with the patient.    If Mail Order Rx - Pend for 90 day refill.      Last Seen: Last Seen Department: 5/31/2024  Last Seen by PCP: 5/22/2024    Last Written: 1-4-2024    If no future appointment scheduled, route STAFF MESSAGE with patient name to the  Pool for scheduling.      Next Appointment:   Future Appointments   Date Time Provider Department Center   8/2/2024 10:30 AM Fabby Hyman APRN - CNP [facility]ER [facility]   8/27/2024 10:40 AM Issa Newman MD AND [facility]   8/29/2024  3:00 PM SCHEDULE, MARTINA DEVICE CHECK [facility]   8/29/2024  3:00 PM Iman Portillo APRN - CNP [facility]   11/20/2024 12:00 PM Luis Manuel Crocker MD [facility] [place] [facility]       Message sent to  to schedule appt with patient?  N/A      Requested Prescriptions     Pending Prescriptions Disp Refills    atorvastatin (LIPITOR) 40 MG tablet [Pharmacy Med Name: ATORVASTATIN CALCIUM 40MG TABLET] 30 tablet 5     Sig: TAKE (1) TABLET DAILY

## 2024-06-12 ENCOUNTER — CARE COORDINATION (OUTPATIENT)
Dept: CASE MANAGEMENT | Age: 69
End: 2024-06-12

## 2024-06-12 NOTE — CARE COORDINATION
Care Transitions Note  Follow Up Call     Patient Current Location:  Home: [address] [place], [place] [id number]    [facility] Care Coordinator contacted the patient by telephone. Verified name and  as identifiers.    Additional needs identified to be addressed with provider   No needs identified                 Method of communication with provider: none.    Care Summary Note:   Spoke to patient. She reports she is doing alright. Denies cp, palpitations, sob, dizziness, ha pain or lh. Bruising around pacemaker site is greenish. Appetite and fluid intake is normal. Patient stated her pacemaker is transmitting well. It is connected with her Iphone. No elimination issues from bladder or bowel.    Plan of care updates since last contact:  Education: use of urgent care if needed.       Advance Care Planning:   Does patient have an Advance Directive:  not on file .    Medication Review:  No changes since last call.     Remote Patient Monitoring:  Offered patient enrollment in the Remote Patient Monitoring (RPM) program for in-home monitoring: Patient is not eligible for RPM program because: deferred at this time; will discuss at future followup.    Assessments:  Care Transitions Subsequent and Final Call    Subsequent and Final Calls  Do you have any ongoing symptoms?: No  Have your medications changed?: No  Do you have any questions related to your medications?: No  Do you currently have any active services?: No  Do you have any needs or concerns that I can assist you with?: No  Identified Barriers: None  Care Transitions Interventions  Other Interventions:              Follow Up Appointment:   QUE appointment attended as scheduled   Future Appointments         Provider Specialty Dept Phone    2024 10:30 AM Fabby Hyman APRN - CNP Cardiology [phone]    2024 10:40 AM Issa Newman MD Pulmonology [phone]    2024 3:00 PM SCHEDULE, MARTINA DEVICE CHECK Cardiology [phone]    2024

## 2024-06-19 ENCOUNTER — CARE COORDINATION (OUTPATIENT)
Dept: CASE MANAGEMENT | Age: 69
End: 2024-06-19

## 2024-06-19 NOTE — CARE COORDINATION
Care Transitions Note    Follow Up Call     Attempted to reach patient for transitions of care follow up.  Unable to reach patient.      Outreach Attempts:   Unable to leave message.     Care Summary Note: phone rings several times with no answer.    Follow Up Appointment:   Future Appointments         Provider Specialty Dept Phone    8/2/2024 10:30 AM Fabby Hyman APRN - [facility] Cardiology [phone]    8/27/2024 10:40 AM Issa Newman MD Pulmonology [phone]    8/29/2024 3:00 PM MARTINA ULLOA DEVICE CHECK Cardiology [phone]    8/29/2024 3:00 PM Iman Portillo APRN - CNP Cardiology [phone]    11/20/2024 12:00 PM Luis Manuel Crocker MD Family Medicine [phone]            Plan for follow-up call in 6-10 days based on severity of symptoms and risk factors. Plan for next call: self management-.    Shanti MEJÍAN, RN, [facility]  Care Transition Nurse  [phone] mobile          Nursing Note by Sakina Virk at 01/16/18 09:49 AM     Author:  Sakina Virk Service:  (none) Author Type:       Filed:  01/16/18 09:49 AM Encounter Date:  1/11/2018 Status:  Signed     :  Sakina Virk ()            scanned[MS1.1M]      Revision History        User Key Date/Time User Provider Type Action    > MS1.1 01/16/18 09:49 AM Sakina Virk  Sign    M - Manual

## 2024-06-24 RX ORDER — SOLIFENACIN SUCCINATE 5 MG/1
TABLET, FILM COATED ORAL
Qty: 30 TABLET | Refills: 5 | Status: SHIPPED | OUTPATIENT
Start: 2024-06-24

## 2024-06-26 ENCOUNTER — CARE COORDINATION (OUTPATIENT)
Dept: CASE MANAGEMENT | Age: 69
End: 2024-06-26

## 2024-06-26 NOTE — CARE COORDINATION
Care Transitions Note    Final Call     Patient Current Location:  [place]    Care Transition Nurse contacted the patient by telephone. Verified name and  as identifiers.    Patient graduated from the Care Transitions program on 24.  Patient/family has the ability to self manage at this time..      Advance Care Planning:   Does patient have an Advance Directive: reviewed during previous call, see note. .    Handoff:   Patient was not referred to the ACM team due to no additional needs identified.       Care Summary Note: CTN spoke with patient who reported she is doing well. Patient denied any cp or palpitations. Patient reported her incision site has healed well. Patient denied any other issues or concerns at this time.     Assessments:  Care Transitions Subsequent and Final Call    Subsequent and Final Calls  Do you have any ongoing symptoms?: No  Have your medications changed?: No  Do you have any questions related to your medications?: No  Do you currently have any active services?: No  Do you have any needs or concerns that I can assist you with?: No  Identified Barriers: None  Care Transitions Interventions  Other Interventions:              Upcoming Appointments:    Future Appointments         Provider Specialty Dept Phone    2024 10:30 AM Fabby Hyman APRN - [facility] Cardiology [phone]    2024 10:40 AM Issa Newman MD Pulmonology [phone]    2024 3:00 PM SCHEDULE, MARTINA DEVICE CHECK Cardiology [phone]    2024 3:00 PM Iman Portillo APRN - CNP Cardiology [phone]    2024 12:00 PM Luis Manuel Crocker MD Family Medicine [phone]            Patient has agreed to contact primary care provider and/or specialist for any further questions, concerns, or needs.    Shanti MEJÍAN, RN, [facility]  Care Transition Nurse  [phone] mobile

## 2024-07-03 RX ORDER — METOPROLOL SUCCINATE 100 MG/1
TABLET, EXTENDED RELEASE ORAL
Qty: 90 TABLET | Refills: 3 | Status: SHIPPED | OUTPATIENT
Start: 2024-07-03

## 2024-07-31 RX ORDER — LISINOPRIL 40 MG/1
TABLET ORAL
Qty: 90 TABLET | Refills: 0 | Status: SHIPPED | OUTPATIENT
Start: 2024-07-31

## 2024-07-31 RX ORDER — MELOXICAM 7.5 MG/1
TABLET ORAL
Qty: 90 TABLET | Refills: 0 | Status: SHIPPED | OUTPATIENT
Start: 2024-07-31

## 2024-08-01 NOTE — PROGRESS NOTES
reviewed and analyzed   CBC:   WBC   Date Value Ref Range Status   05/24/2024 11.4 (H) 4.0 - 11.0 K/uL Final   05/23/2024 7.8 4.0 - 11.0 K/uL Final   12/12/2023 7.9 4.0 - 11.0 K/uL Final     RBC   Date Value Ref Range Status   05/24/2024 4.93 4.00 - 5.20 M/uL Final   05/23/2024 5.13 4.00 - 5.20 M/uL Final   12/12/2023 4.52 4.00 - 5.20 M/uL Final     Hemoglobin   Date Value Ref Range Status   05/24/2024 15.1 12.0 - 16.0 g/dL Final   05/23/2024 15.7 12.0 - 16.0 g/dL Final   12/12/2023 13.8 12.0 - 16.0 g/dL Final     Hematocrit   Date Value Ref Range Status   05/24/2024 45.4 36.0 - 48.0 % Final   05/23/2024 46.9 36.0 - 48.0 % Final   12/12/2023 41.5 36.0 - 48.0 % Final     MCV   Date Value Ref Range Status   05/24/2024 92.1 80.0 - 100.0 fL Final   05/23/2024 91.4 80.0 - 100.0 fL Final   12/12/2023 91.8 80.0 - 100.0 fL Final     RDW   Date Value Ref Range Status   05/24/2024 13.6 12.4 - 15.4 % Final   05/23/2024 13.6 12.4 - 15.4 % Final   12/12/2023 13.1 12.4 - 15.4 % Final     Platelets   Date Value Ref Range Status   05/24/2024 268 135 - 450 K/uL Final   05/23/2024 309 135 - 450 K/uL Final   12/12/2023 313 135 - 450 K/uL Final     Iron:  No results found for: \"IRON\", \"TIBC\", \"FERRITIN\"  BMP:   Sodium   Date Value Ref Range Status   05/24/2024 142 136 - 145 mmol/L Final   05/23/2024 141 136 - 145 mmol/L Final   02/16/2024 145 136 - 145 mmol/L Final     Potassium   Date Value Ref Range Status   05/24/2024 4.2 3.5 - 5.1 mmol/L Final   05/23/2024 3.7 3.5 - 5.1 mmol/L Final   02/16/2024 3.6 3.5 - 5.1 mmol/L Final     Chloride   Date Value Ref Range Status   05/24/2024 110 99 - 110 mmol/L Final   05/23/2024 107 99 - 110 mmol/L Final   02/16/2024 110 99 - 110 mmol/L Final     CO2   Date Value Ref Range Status   05/24/2024 19 (L) 21 - 32 mmol/L Final   05/23/2024 24 21 - 32 mmol/L Final   02/16/2024 25 21 - 32 mmol/L Final     BUN   Date Value Ref Range Status   05/24/2024 17 7 - 20 mg/dL Final   05/23/2024 19 7 - 20 mg/dL

## 2024-08-02 ENCOUNTER — OFFICE VISIT (OUTPATIENT)
Dept: CARDIOLOGY CLINIC | Age: 69
End: 2024-08-02
Payer: MEDICARE

## 2024-08-02 VITALS
SYSTOLIC BLOOD PRESSURE: 142 MMHG | HEIGHT: 61 IN | BODY MASS INDEX: 43.46 KG/M2 | OXYGEN SATURATION: 97 % | WEIGHT: 230.2 LBS | HEART RATE: 72 BPM | DIASTOLIC BLOOD PRESSURE: 92 MMHG

## 2024-08-02 DIAGNOSIS — I50.22 CHRONIC SYSTOLIC CONGESTIVE HEART FAILURE (HCC): ICD-10-CM

## 2024-08-02 DIAGNOSIS — Z95.0 CARDIAC RESYNCHRONIZATION THERAPY PACEMAKER (CRT-P) IN PLACE: ICD-10-CM

## 2024-08-02 DIAGNOSIS — I50.22 HEART FAILURE WITH MID-RANGE EJECTION FRACTION (HCC): Primary | ICD-10-CM

## 2024-08-02 PROCEDURE — G8427 DOCREV CUR MEDS BY ELIG CLIN: HCPCS | Performed by: NURSE PRACTITIONER

## 2024-08-02 PROCEDURE — 3080F DIAST BP >= 90 MM HG: CPT | Performed by: NURSE PRACTITIONER

## 2024-08-02 PROCEDURE — 99214 OFFICE O/P EST MOD 30 MIN: CPT | Performed by: NURSE PRACTITIONER

## 2024-08-02 PROCEDURE — 1090F PRES/ABSN URINE INCON ASSESS: CPT | Performed by: NURSE PRACTITIONER

## 2024-08-02 PROCEDURE — G8417 CALC BMI ABV UP PARAM F/U: HCPCS | Performed by: NURSE PRACTITIONER

## 2024-08-02 PROCEDURE — 3077F SYST BP >= 140 MM HG: CPT | Performed by: NURSE PRACTITIONER

## 2024-08-02 PROCEDURE — G8400 PT W/DXA NO RESULTS DOC: HCPCS | Performed by: NURSE PRACTITIONER

## 2024-08-02 PROCEDURE — 3017F COLORECTAL CA SCREEN DOC REV: CPT | Performed by: NURSE PRACTITIONER

## 2024-08-02 PROCEDURE — 1036F TOBACCO NON-USER: CPT | Performed by: NURSE PRACTITIONER

## 2024-08-02 PROCEDURE — 1123F ACP DISCUSS/DSCN MKR DOCD: CPT | Performed by: NURSE PRACTITIONER

## 2024-08-02 RX ORDER — SPIRONOLACTONE 25 MG/1
25 TABLET ORAL DAILY
Qty: 90 TABLET | Refills: 1 | Status: SHIPPED | OUTPATIENT
Start: 2024-08-02

## 2024-08-02 NOTE — PATIENT INSTRUCTIONS
Plan:   Check daily weights  Continue lisinopril 40mg daily   Continue toprol 150mg daily   Jardiance 10mg daily   Add Spironolactone (aldactone) 25 mg daily   Check BMP in 1-2 weeks   Repeat Echo in November   Follow up with EP as planned with device check

## 2024-08-15 ENCOUNTER — HOSPITAL ENCOUNTER (OUTPATIENT)
Age: 69
Discharge: HOME | End: 2024-08-15
Payer: MEDICARE

## 2024-08-15 DIAGNOSIS — Z95.0 CARDIAC RESYNCHRONIZATION THERAPY PACEMAKER (CRT-P) IN PLACE: ICD-10-CM

## 2024-08-15 DIAGNOSIS — I50.22 HEART FAILURE WITH MID-RANGE EJECTION FRACTION (HCC): ICD-10-CM

## 2024-08-15 LAB
ANION GAP SERPL CALCULATED.3IONS-SCNC: 10 MMOL/L (ref 3–16)
BUN SERPL-MCNC: 18 MG/DL (ref 7–20)
CALCIUM SERPL-MCNC: 9.5 MG/DL (ref 8.3–10.6)
CHLORIDE SERPL-SCNC: 108 MMOL/L (ref 99–110)
CO2 SERPL-SCNC: 23 MMOL/L (ref 21–32)
CREAT SERPL-MCNC: 0.9 MG/DL (ref 0.6–1.2)
GFR SERPLBLD CREATININE-BSD FMLA CKD-EPI: 70 ML/MIN/{1.73_M2}
GLUCOSE SERPL-MCNC: 100 MG/DL (ref 70–99)
POTASSIUM SERPL-SCNC: 4.9 MMOL/L (ref 3.5–5.1)
SODIUM SERPL-SCNC: 141 MMOL/L (ref 136–145)

## 2024-08-15 PROCEDURE — 80048 BASIC METABOLIC PNL TOTAL CA: CPT

## 2024-08-15 PROCEDURE — 36415 COLL VENOUS BLD VENIPUNCTURE: CPT

## 2024-08-16 ENCOUNTER — TELEPHONE (OUTPATIENT)
Dept: CARDIOLOGY CLINIC | Age: 69
End: 2024-08-16

## 2024-08-16 ENCOUNTER — PATIENT MESSAGE (OUTPATIENT)
Dept: CARDIOLOGY CLINIC | Age: 69
End: 2024-08-16

## 2024-08-16 NOTE — TELEPHONE ENCOUNTER
Pt stated that she is due for a remote transmission on 8/20 but is due to see npkk on 8/29. Pt would like to know if she can just have her device checked in office or if she needs to have both. Please advise.

## 2024-08-16 NOTE — TELEPHONE ENCOUNTER
You are scheduled  8/20/2024 to send the transmission from your home monitor that checks your cardiac device.  You may send it anytime after receiving this message.   If your transmission requires attention, we will contact you as soon as possible. If you need assistance sending the transmission please call the company listed on your monitor and they will assist you with the process.    [employer]/ [employer] stay connected   [phone]    Thank you.     [facility].    [phone]

## 2024-08-16 NOTE — TELEPHONE ENCOUNTER
I spoke with the patient and explained how the billing cycle worked and remote transmission schedule. She will proceed with the remote transmission as scheduled.

## 2024-08-23 PROCEDURE — 93297 REM INTERROG DEV EVAL ICPMS: CPT | Performed by: NURSE PRACTITIONER

## 2024-08-27 ENCOUNTER — OFFICE VISIT (OUTPATIENT)
Dept: PULMONOLOGY | Age: 69
End: 2024-08-27
Payer: MEDICARE

## 2024-08-27 VITALS
HEIGHT: 60 IN | TEMPERATURE: 97.2 F | HEART RATE: 81 BPM | RESPIRATION RATE: 16 BRPM | WEIGHT: 226.13 LBS | SYSTOLIC BLOOD PRESSURE: 137 MMHG | OXYGEN SATURATION: 98 % | BODY MASS INDEX: 44.39 KG/M2 | DIASTOLIC BLOOD PRESSURE: 88 MMHG

## 2024-08-27 DIAGNOSIS — G47.33 MODERATE OBSTRUCTIVE SLEEP APNEA: Primary | ICD-10-CM

## 2024-08-27 DIAGNOSIS — E66.01 MORBID OBESITY (HCC): ICD-10-CM

## 2024-08-27 PROCEDURE — 3017F COLORECTAL CA SCREEN DOC REV: CPT | Performed by: STUDENT IN AN ORGANIZED HEALTH CARE EDUCATION/TRAINING PROGRAM

## 2024-08-27 PROCEDURE — 99213 OFFICE O/P EST LOW 20 MIN: CPT | Performed by: STUDENT IN AN ORGANIZED HEALTH CARE EDUCATION/TRAINING PROGRAM

## 2024-08-27 PROCEDURE — G8427 DOCREV CUR MEDS BY ELIG CLIN: HCPCS | Performed by: STUDENT IN AN ORGANIZED HEALTH CARE EDUCATION/TRAINING PROGRAM

## 2024-08-27 PROCEDURE — G8417 CALC BMI ABV UP PARAM F/U: HCPCS | Performed by: STUDENT IN AN ORGANIZED HEALTH CARE EDUCATION/TRAINING PROGRAM

## 2024-08-27 PROCEDURE — 1090F PRES/ABSN URINE INCON ASSESS: CPT | Performed by: STUDENT IN AN ORGANIZED HEALTH CARE EDUCATION/TRAINING PROGRAM

## 2024-08-27 PROCEDURE — G8400 PT W/DXA NO RESULTS DOC: HCPCS | Performed by: STUDENT IN AN ORGANIZED HEALTH CARE EDUCATION/TRAINING PROGRAM

## 2024-08-27 PROCEDURE — 1123F ACP DISCUSS/DSCN MKR DOCD: CPT | Performed by: STUDENT IN AN ORGANIZED HEALTH CARE EDUCATION/TRAINING PROGRAM

## 2024-08-27 PROCEDURE — 3079F DIAST BP 80-89 MM HG: CPT | Performed by: STUDENT IN AN ORGANIZED HEALTH CARE EDUCATION/TRAINING PROGRAM

## 2024-08-27 PROCEDURE — 1036F TOBACCO NON-USER: CPT | Performed by: STUDENT IN AN ORGANIZED HEALTH CARE EDUCATION/TRAINING PROGRAM

## 2024-08-27 PROCEDURE — 3075F SYST BP GE 130 - 139MM HG: CPT | Performed by: STUDENT IN AN ORGANIZED HEALTH CARE EDUCATION/TRAINING PROGRAM

## 2024-08-27 ASSESSMENT — ENCOUNTER SYMPTOMS
ABDOMINAL DISTENTION: 0
EYE ITCHING: 0
ABDOMINAL PAIN: 0
SORE THROAT: 0
VOMITING: 0
COUGH: 0
BACK PAIN: 0
COLOR CHANGE: 0
STRIDOR: 0
EYE PAIN: 0
SHORTNESS OF BREATH: 0
EYE DISCHARGE: 0
TROUBLE SWALLOWING: 0
NAUSEA: 0
DIARRHEA: 0
WHEEZING: 0
CONSTIPATION: 0
EYE REDNESS: 0

## 2024-08-27 NOTE — PROGRESS NOTES
[facility] Pulmonary Follow-up  [address] [place] [address]  [phone]        Brenda Mcleod (: 1955 ) is a 69 y.o. female here for an evaluation of   Chief Complaint   Patient presents with    Follow-up     1 yr sleep     Sleep Apnea         SUBJECTIVE/OBJECTIVE:    Patient is 69-year-old female with significant past medical history of SVT, obesity, moderate ANNA that presents to [facility] pulmonary clinic for follow-up visit. Patient has no complaints today. She denies any fever, chills, shortness of breath, nausea, vomiting, cough.  Patient is here for follow-up of compliance report      Review of Systems   Constitutional:  Negative for activity change, appetite change, chills, diaphoresis and fatigue.   HENT:  Negative for congestion, sore throat and trouble swallowing.    Eyes:  Negative for pain, discharge, redness and itching.   Respiratory:  Negative for cough, shortness of breath, wheezing and stridor.    Cardiovascular:  Negative for chest pain, palpitations and leg swelling.   Gastrointestinal:  Negative for abdominal distention, abdominal pain, constipation, diarrhea, nausea and vomiting.   Endocrine: Negative for polydipsia, polyphagia and polyuria.   Genitourinary:  Negative for difficulty urinating.   Musculoskeletal:  Negative for back pain, myalgias and neck pain.   Skin:  Negative for color change.   Neurological:  Negative for dizziness, weakness and light-headedness.   Psychiatric/Behavioral:  Negative for agitation and behavioral problems.          Vitals:    24 1026   BP: 137/88   Pulse: 81   Resp: 16   Temp: 97.2 °F (36.2 °C)   TempSrc: Temporal   SpO2: 98%   Weight: 102.6 kg (226 lb 2 oz)   Height: 1.524 m (5')        Physical Exam  Constitutional:       General: She is not in acute distress.     Appearance: She is not toxic-appearing.   HENT:      Head: Normocephalic and atraumatic.      Nose: Nose normal.      Mouth/Throat:      Pharynx: No oropharyngeal

## 2024-08-27 NOTE — PATIENT INSTRUCTIONS
Remember to bring a list of pulmonary medications and any CPAP or BiPAP machines to your next appointment with the office.     Please keep all of your future appointments scheduled by [facility] [place] [facility] office. Out of respect for other patients and providers, you may be asked to reschedule your appointment if you arrive later than your scheduled appointment time. Appointments cancelled less than 24hrs in advance will be considered a no show. Patients with three missed appointments within 1 year or four missed appointments within 2 years can be dismissed from the practice.     Please be aware that our physicians are required to work in the Intensive Care Unit at [facility].  Your appointment may need to be rescheduled if they are designated to work during your appointment time.      You may receive a survey regarding the care you received during your visit.  Your input is valuable to us.  We encourage you to complete and return your survey.  We hope you will choose us in the future for your healthcare needs.     Pt instructed of all future appointment dates & times, including radiology, labs, procedures & referrals. If procedures were scheduled preparation instructions provided. Instructions on future appointments with [facility]monary were given.      In the next few weeks, you will be receiving a survey from [facility] regarding your visit today.  We would greatly appreciate it if you would take just a few minutes to fill that out.  It is very important to us that our patients receive top notch care and our surveys help keep us accountable. However, if your experience was not a good one, we want to hear about that as well. This is a key way we can keep track of problems and strive to correct any for future visits.    Again, we appreciate your time and thank you for choosing [facility]!    Alyssa SANCHEZ

## 2024-08-27 NOTE — PROGRESS NOTES
MA Communication:  The following orders are received by verbal communication from   Issa Newman MD    Orders include:  FU 1 yr sleep

## 2024-08-28 NOTE — PROGRESS NOTES
[facility]   Electrophysiology Outpatient Note              Date:  August 28, 2024  Patient name: Brenda Mcleod  YOB: 1955    Primary Care physician: Luis Manuel Crocker MD    HISTORY OF PRESENT ILLNESS: The patient is a 69 y.o.  female with a history of sinus node dysfunction, DC PPM, syncope, PAT, hypertension and ANNA    On 10/2017 patient was admitted to the hospital for syncope.  Echo revealed EF 55%.  She was discharged with a 30-day event monitor which showed multiple pauses.  She had 6.4-second pause with syncope.  On 11/2017 patient underwent implantation of dual-chamber pacemaker.  Patient had PAT during her hospitalization and was started on atenolol.    On 10/2/2023 patient was seen in office for sinus node dysfunction and PAT ECG revealed a PVP with heart rate of 60.  She did endorse some shortness of breath with exertion.    1/18/2024 she was seen for possible device upgrade. Device check reveals Mode AAI>DDD.  AP 69.7%,  99.6% , RA impedance 347 ohms, RV impedance 646 ohms.  1 NSVT event x 9 beats recorded . EKG shows sinus rhythm rate 64 . Patient had cardiac cath performed on 12/12/2023 for new cardiomyopathy.  Patient was found to have mild to moderate CAD.  She was switched from atenolol to metoprolol 100 mg at that time.  She is hypertensive today and states when she checks her blood pressure at home it is elevated over 150.  Patient denies chest pain, shortness of breath and palpitations.  States her quality of life has not changed.  She does not get short of breath with activity and less \"cutting the grass\".  She continues to help with  classes.  Patient states she used to follow with Dr. Reardon and has not seen a new EP doctor since.  Will set her up with  to decide on device upgrade.  Today patient is being seen for device management.  Device check reveals AP 87.6%,  99.5%.  EFF 99.4%.  No events recorded.  ECG reveals atrial

## 2024-08-29 ENCOUNTER — OFFICE VISIT (OUTPATIENT)
Dept: CARDIOLOGY CLINIC | Age: 69
End: 2024-08-29

## 2024-08-29 ENCOUNTER — NURSE ONLY (OUTPATIENT)
Dept: CARDIOLOGY CLINIC | Age: 69
End: 2024-08-29

## 2024-08-29 VITALS
BODY MASS INDEX: 44.8 KG/M2 | WEIGHT: 228.2 LBS | HEIGHT: 60 IN | SYSTOLIC BLOOD PRESSURE: 122 MMHG | OXYGEN SATURATION: 96 % | DIASTOLIC BLOOD PRESSURE: 66 MMHG | HEART RATE: 69 BPM

## 2024-08-29 DIAGNOSIS — Z95.0 CARDIAC RESYNCHRONIZATION THERAPY PACEMAKER (CRT-P) IN PLACE: Primary | ICD-10-CM

## 2024-08-29 DIAGNOSIS — I47.19 PAROXYSMAL ATRIAL TACHYCARDIA (HCC): Primary | ICD-10-CM

## 2024-08-29 DIAGNOSIS — I25.5 ISCHEMIC CARDIOMYOPATHY: ICD-10-CM

## 2024-08-29 NOTE — PATIENT INSTRUCTIONS
Continue Toprol  mg  Continue lisinopril 40 mg daily  Continue Lipitor 40 mg nightly and aspirin 81 mg daily  Continue Hytrin 5 mg nightly  Continue Jardiance 10 mg daily  Continue Aldactone 25 mg daily  Limited echo as ordered per Lupe Hyman CNP --[facility]  Daily weights    Follow-up 6 months Iman LIVE

## 2024-09-04 ENCOUNTER — TELEPHONE (OUTPATIENT)
Dept: FAMILY MEDICINE CLINIC | Age: 69
End: 2024-09-04

## 2024-09-04 NOTE — TELEPHONE ENCOUNTER
Nurse attempted to contact the patient related to AWV by phone. Patient has an appointment for the AWV 11/20/24, at her visit with Dr Crocker, The AWV needs to be changed to another day and done by phone (unless patient would like to come to the office). The physician does not do dual visits.

## 2024-09-13 ENCOUNTER — OFFICE VISIT (OUTPATIENT)
Dept: FAMILY MEDICINE CLINIC | Age: 69
End: 2024-09-13
Payer: MEDICARE

## 2024-09-13 VITALS
SYSTOLIC BLOOD PRESSURE: 135 MMHG | BODY MASS INDEX: 44.49 KG/M2 | HEART RATE: 93 BPM | OXYGEN SATURATION: 97 % | DIASTOLIC BLOOD PRESSURE: 79 MMHG | WEIGHT: 227.8 LBS

## 2024-09-13 DIAGNOSIS — Z01.818 PRE-OP EXAM: Primary | ICD-10-CM

## 2024-09-13 DIAGNOSIS — H25.9 AGE-RELATED CATARACT OF BOTH EYES, UNSPECIFIED AGE-RELATED CATARACT TYPE: ICD-10-CM

## 2024-09-13 PROCEDURE — G8427 DOCREV CUR MEDS BY ELIG CLIN: HCPCS

## 2024-09-13 PROCEDURE — 3017F COLORECTAL CA SCREEN DOC REV: CPT

## 2024-09-13 PROCEDURE — 36415 COLL VENOUS BLD VENIPUNCTURE: CPT

## 2024-09-13 PROCEDURE — 1090F PRES/ABSN URINE INCON ASSESS: CPT

## 2024-09-13 PROCEDURE — 1036F TOBACCO NON-USER: CPT

## 2024-09-13 PROCEDURE — 99213 OFFICE O/P EST LOW 20 MIN: CPT

## 2024-09-13 PROCEDURE — 1123F ACP DISCUSS/DSCN MKR DOCD: CPT

## 2024-09-13 PROCEDURE — G8400 PT W/DXA NO RESULTS DOC: HCPCS

## 2024-09-13 PROCEDURE — G8417 CALC BMI ABV UP PARAM F/U: HCPCS

## 2024-09-13 PROCEDURE — 3078F DIAST BP <80 MM HG: CPT

## 2024-09-13 PROCEDURE — 3075F SYST BP GE 130 - 139MM HG: CPT

## 2024-09-13 ASSESSMENT — ENCOUNTER SYMPTOMS
EYE DISCHARGE: 0
ABDOMINAL PAIN: 0
ALLERGIC/IMMUNOLOGIC NEGATIVE: 1
SINUS PAIN: 0
BLOOD IN STOOL: 0
SORE THROAT: 0
VOMITING: 0
RHINORRHEA: 0
NAUSEA: 0
SHORTNESS OF BREATH: 0
COUGH: 0

## 2024-09-14 LAB
ALBUMIN SERPL-MCNC: 4 G/DL (ref 3.4–5)
ALBUMIN/GLOB SERPL: 1.7 {RATIO} (ref 1.1–2.2)
ALP SERPL-CCNC: 109 U/L (ref 40–129)
ALT SERPL-CCNC: 39 U/L (ref 10–40)
ANION GAP SERPL CALCULATED.3IONS-SCNC: 11 MMOL/L (ref 3–16)
AST SERPL-CCNC: 41 U/L (ref 15–37)
BILIRUB SERPL-MCNC: 0.8 MG/DL (ref 0–1)
BUN SERPL-MCNC: 21 MG/DL (ref 7–20)
CALCIUM SERPL-MCNC: 9.5 MG/DL (ref 8.3–10.6)
CHLORIDE SERPL-SCNC: 108 MMOL/L (ref 99–110)
CO2 SERPL-SCNC: 25 MMOL/L (ref 21–32)
CREAT SERPL-MCNC: 1 MG/DL (ref 0.6–1.2)
DEPRECATED RDW RBC AUTO: 13.6 % (ref 12.4–15.4)
GFR SERPLBLD CREATININE-BSD FMLA CKD-EPI: 62 ML/MIN/{1.73_M2}
GLUCOSE SERPL-MCNC: 82 MG/DL (ref 70–99)
HCT VFR BLD AUTO: 41.5 % (ref 36–48)
HGB BLD-MCNC: 13.8 G/DL (ref 12–16)
MCH RBC QN AUTO: 30.2 PG (ref 26–34)
MCHC RBC AUTO-ENTMCNC: 33.2 G/DL (ref 31–36)
MCV RBC AUTO: 91 FL (ref 80–100)
PLATELET # BLD AUTO: 268 K/UL (ref 135–450)
PMV BLD AUTO: 8.8 FL (ref 5–10.5)
POTASSIUM SERPL-SCNC: 4.3 MMOL/L (ref 3.5–5.1)
PROT SERPL-MCNC: 6.3 G/DL (ref 6.4–8.2)
RBC # BLD AUTO: 4.56 M/UL (ref 4–5.2)
SODIUM SERPL-SCNC: 144 MMOL/L (ref 136–145)
WBC # BLD AUTO: 6.5 K/UL (ref 4–11)

## 2024-09-16 ENCOUNTER — ANESTHESIA EVENT (OUTPATIENT)
Dept: OPERATING ROOM | Age: 69
End: 2024-09-16
Payer: MEDICARE

## 2024-09-19 ENCOUNTER — HOSPITAL ENCOUNTER (OUTPATIENT)
Age: 69
Setting detail: OUTPATIENT SURGERY
Discharge: HOME | End: 2024-09-19
Attending: OPHTHALMOLOGY | Admitting: OPHTHALMOLOGY
Payer: MEDICARE

## 2024-09-19 ENCOUNTER — ANESTHESIA (OUTPATIENT)
Dept: OPERATING ROOM | Age: 69
End: 2024-09-19
Payer: MEDICARE

## 2024-09-19 VITALS
SYSTOLIC BLOOD PRESSURE: 128 MMHG | OXYGEN SATURATION: 97 % | BODY MASS INDEX: 44.37 KG/M2 | HEART RATE: 67 BPM | HEIGHT: 60 IN | RESPIRATION RATE: 16 BRPM | TEMPERATURE: 98 F | DIASTOLIC BLOOD PRESSURE: 69 MMHG | WEIGHT: 226 LBS

## 2024-09-19 PROCEDURE — 7100000010 HC PHASE II RECOVERY - FIRST 15 MIN: Performed by: OPHTHALMOLOGY

## 2024-09-19 PROCEDURE — 7100000011 HC PHASE II RECOVERY - ADDTL 15 MIN: Performed by: OPHTHALMOLOGY

## 2024-09-19 PROCEDURE — V2632 POST CHMBR INTRAOCULAR LENS: HCPCS | Performed by: OPHTHALMOLOGY

## 2024-09-19 PROCEDURE — 3600000013 HC SURGERY LEVEL 3 ADDTL 15MIN: Performed by: OPHTHALMOLOGY

## 2024-09-19 PROCEDURE — 6370000000 HC RX 637 (ALT 250 FOR IP): Performed by: OPHTHALMOLOGY

## 2024-09-19 PROCEDURE — 6360000002 HC RX W HCPCS: Performed by: NURSE ANESTHETIST, CERTIFIED REGISTERED

## 2024-09-19 PROCEDURE — 3700000001 HC ADD 15 MINUTES (ANESTHESIA): Performed by: OPHTHALMOLOGY

## 2024-09-19 PROCEDURE — 3700000000 HC ANESTHESIA ATTENDED CARE: Performed by: OPHTHALMOLOGY

## 2024-09-19 PROCEDURE — 3600000003 HC SURGERY LEVEL 3 BASE: Performed by: OPHTHALMOLOGY

## 2024-09-19 PROCEDURE — 2580000003 HC RX 258: Performed by: ANESTHESIOLOGY

## 2024-09-19 PROCEDURE — 2500000003 HC RX 250 WO HCPCS: Performed by: NURSE ANESTHETIST, CERTIFIED REGISTERED

## 2024-09-19 PROCEDURE — 6360000002 HC RX W HCPCS: Performed by: OPHTHALMOLOGY

## 2024-09-19 PROCEDURE — 2709999900 HC NON-CHARGEABLE SUPPLY: Performed by: OPHTHALMOLOGY

## 2024-09-19 PROCEDURE — 2500000003 HC RX 250 WO HCPCS: Performed by: OPHTHALMOLOGY

## 2024-09-19 DEVICE — LENS CLAREON IOL 15.5D: Type: IMPLANTABLE DEVICE | Site: EYE | Status: FUNCTIONAL

## 2024-09-19 RX ORDER — TOBRAMYCIN AND DEXAMETHASONE 3; 1 MG/ML; MG/ML
1 SUSPENSION/ DROPS OPHTHALMIC CONTINUOUS
Status: DISCONTINUED | OUTPATIENT
Start: 2024-09-19 | End: 2024-09-19 | Stop reason: HOSPADM

## 2024-09-19 RX ORDER — ONDANSETRON 2 MG/ML
4 INJECTION INTRAMUSCULAR; INTRAVENOUS
Status: CANCELLED | OUTPATIENT
Start: 2024-09-19

## 2024-09-19 RX ORDER — SODIUM CHLORIDE, SODIUM LACTATE, POTASSIUM CHLORIDE, CALCIUM CHLORIDE 600; 310; 30; 20 MG/100ML; MG/100ML; MG/100ML; MG/100ML
INJECTION, SOLUTION INTRAVENOUS CONTINUOUS
Status: DISCONTINUED | OUTPATIENT
Start: 2024-09-19 | End: 2024-09-19 | Stop reason: HOSPADM

## 2024-09-19 RX ORDER — SODIUM CHLORIDE 0.9 % (FLUSH) 0.9 %
5-40 SYRINGE (ML) INJECTION PRN
Status: CANCELLED | OUTPATIENT
Start: 2024-09-19

## 2024-09-19 RX ORDER — SODIUM CHLORIDE 9 MG/ML
INJECTION, SOLUTION INTRAVENOUS PRN
Status: DISCONTINUED | OUTPATIENT
Start: 2024-09-19 | End: 2024-09-19 | Stop reason: HOSPADM

## 2024-09-19 RX ORDER — PILOCARPINE HYDROCHLORIDE 20 MG/ML
SOLUTION/ DROPS OPHTHALMIC PRN
Status: DISCONTINUED | OUTPATIENT
Start: 2024-09-19 | End: 2024-09-19 | Stop reason: ALTCHOICE

## 2024-09-19 RX ORDER — CYCLOPENT/TROPIC/PHEN/KETR/WAT 1 %-1%-10%
0.3 DROPS (EA) OPHTHALMIC (EYE) EVERY 10 MIN PRN
Status: COMPLETED | OUTPATIENT
Start: 2024-09-19 | End: 2024-09-19

## 2024-09-19 RX ORDER — PROPOFOL 10 MG/ML
INJECTION, EMULSION INTRAVENOUS
Status: DISCONTINUED | OUTPATIENT
Start: 2024-09-19 | End: 2024-09-19 | Stop reason: SDUPTHER

## 2024-09-19 RX ORDER — DEXAMETHASONE SODIUM PHOSPHATE 4 MG/ML
INJECTION, SOLUTION INTRA-ARTICULAR; INTRALESIONAL; INTRAMUSCULAR; INTRAVENOUS; SOFT TISSUE
Status: DISCONTINUED | OUTPATIENT
Start: 2024-09-19 | End: 2024-09-19 | Stop reason: SDUPTHER

## 2024-09-19 RX ORDER — SODIUM CHLORIDE 0.9 % (FLUSH) 0.9 %
5-40 SYRINGE (ML) INJECTION EVERY 12 HOURS SCHEDULED
Status: DISCONTINUED | OUTPATIENT
Start: 2024-09-19 | End: 2024-09-19 | Stop reason: HOSPADM

## 2024-09-19 RX ORDER — SODIUM CHLORIDE, POTASSIUM CHLORIDE, CALCIUM CHLORIDE, MAGNESIUM CHLORIDE, SODIUM ACETATE, AND SODIUM CITRATE 6.4; .75; .48; .3; 3.9; 1.7 MG/ML; MG/ML; MG/ML; MG/ML; MG/ML; MG/ML
SOLUTION IRRIGATION PRN
Status: DISCONTINUED | OUTPATIENT
Start: 2024-09-19 | End: 2024-09-19 | Stop reason: ALTCHOICE

## 2024-09-19 RX ORDER — TETRACAINE HYDROCHLORIDE 5 MG/ML
1 SOLUTION OPHTHALMIC
Status: COMPLETED | OUTPATIENT
Start: 2024-09-19 | End: 2024-09-19

## 2024-09-19 RX ORDER — SODIUM CHLORIDE 0.9 % (FLUSH) 0.9 %
5-40 SYRINGE (ML) INJECTION PRN
Status: DISCONTINUED | OUTPATIENT
Start: 2024-09-19 | End: 2024-09-19 | Stop reason: HOSPADM

## 2024-09-19 RX ORDER — PREDNISOLONE ACETATE 10 MG/ML
1 SUSPENSION/ DROPS OPHTHALMIC CONTINUOUS
Status: DISCONTINUED | OUTPATIENT
Start: 2024-09-19 | End: 2024-09-19 | Stop reason: HOSPADM

## 2024-09-19 RX ORDER — SODIUM CHLORIDE 0.9 % (FLUSH) 0.9 %
5-40 SYRINGE (ML) INJECTION EVERY 12 HOURS SCHEDULED
Status: CANCELLED | OUTPATIENT
Start: 2024-09-19

## 2024-09-19 RX ORDER — NALOXONE HYDROCHLORIDE 0.4 MG/ML
INJECTION, SOLUTION INTRAMUSCULAR; INTRAVENOUS; SUBCUTANEOUS PRN
Status: CANCELLED | OUTPATIENT
Start: 2024-09-19

## 2024-09-19 RX ORDER — SODIUM CHLORIDE 9 MG/ML
INJECTION, SOLUTION INTRAVENOUS PRN
Status: CANCELLED | OUTPATIENT
Start: 2024-09-19

## 2024-09-19 RX ORDER — TETRACAINE HYDROCHLORIDE 5 MG/ML
SOLUTION OPHTHALMIC PRN
Status: DISCONTINUED | OUTPATIENT
Start: 2024-09-19 | End: 2024-09-19 | Stop reason: ALTCHOICE

## 2024-09-19 RX ORDER — LIDOCAINE HYDROCHLORIDE 10 MG/ML
0.3 INJECTION, SOLUTION EPIDURAL; INFILTRATION; INTRACAUDAL; PERINEURAL
Status: DISCONTINUED | OUTPATIENT
Start: 2024-09-19 | End: 2024-09-19 | Stop reason: HOSPADM

## 2024-09-19 RX ORDER — TOBRAMYCIN AND DEXAMETHASONE 3; 1 MG/ML; MG/ML
SUSPENSION/ DROPS OPHTHALMIC PRN
Status: DISCONTINUED | OUTPATIENT
Start: 2024-09-19 | End: 2024-09-19 | Stop reason: ALTCHOICE

## 2024-09-19 RX ORDER — DIPHENHYDRAMINE HYDROCHLORIDE 50 MG/ML
12.5 INJECTION INTRAMUSCULAR; INTRAVENOUS
Status: CANCELLED | OUTPATIENT
Start: 2024-09-19 | End: 2024-09-20

## 2024-09-19 RX ORDER — LABETALOL HYDROCHLORIDE 5 MG/ML
5 INJECTION, SOLUTION INTRAVENOUS EVERY 10 MIN PRN
Status: CANCELLED | OUTPATIENT
Start: 2024-09-19

## 2024-09-19 RX ORDER — BROMFENAC 1.03 MG/ML
1 SOLUTION/ DROPS OPHTHALMIC ONCE
Status: COMPLETED | OUTPATIENT
Start: 2024-09-19 | End: 2024-09-19

## 2024-09-19 RX ORDER — ONDANSETRON 2 MG/ML
INJECTION INTRAMUSCULAR; INTRAVENOUS
Status: DISCONTINUED | OUTPATIENT
Start: 2024-09-19 | End: 2024-09-19 | Stop reason: SDUPTHER

## 2024-09-19 RX ORDER — OXYCODONE HYDROCHLORIDE 5 MG/1
5 TABLET ORAL PRN
Status: CANCELLED | OUTPATIENT
Start: 2024-09-19 | End: 2024-09-19

## 2024-09-19 RX ORDER — PREDNISOLONE ACETATE 10 MG/ML
SUSPENSION/ DROPS OPHTHALMIC PRN
Status: DISCONTINUED | OUTPATIENT
Start: 2024-09-19 | End: 2024-09-19 | Stop reason: ALTCHOICE

## 2024-09-19 RX ORDER — OXYCODONE HYDROCHLORIDE 5 MG/1
10 TABLET ORAL PRN
Status: CANCELLED | OUTPATIENT
Start: 2024-09-19 | End: 2024-09-19

## 2024-09-19 RX ORDER — LIDOCAINE HYDROCHLORIDE 20 MG/ML
INJECTION, SOLUTION EPIDURAL; INFILTRATION; INTRACAUDAL; PERINEURAL
Status: DISCONTINUED | OUTPATIENT
Start: 2024-09-19 | End: 2024-09-19 | Stop reason: SDUPTHER

## 2024-09-19 RX ORDER — BROMFENAC 1.03 MG/ML
1 SOLUTION/ DROPS OPHTHALMIC CONTINUOUS
Status: DISCONTINUED | OUTPATIENT
Start: 2024-09-19 | End: 2024-09-19 | Stop reason: HOSPADM

## 2024-09-19 RX ADMIN — Medication 0.3 ML: at 11:23

## 2024-09-19 RX ADMIN — Medication 0.3 ML: at 11:30

## 2024-09-19 RX ADMIN — LIDOCAINE HYDROCHLORIDE 60 MG: 20 INJECTION, SOLUTION EPIDURAL; INFILTRATION; INTRACAUDAL; PERINEURAL at 12:40

## 2024-09-19 RX ADMIN — PROPOFOL 50 MG: 10 INJECTION, EMULSION INTRAVENOUS at 12:40

## 2024-09-19 RX ADMIN — TETRACAINE HYDROCHLORIDE 1 DROP: 5 SOLUTION OPHTHALMIC at 11:23

## 2024-09-19 RX ADMIN — TETRACAINE HYDROCHLORIDE 1 DROP: 5 SOLUTION OPHTHALMIC at 11:14

## 2024-09-19 RX ADMIN — Medication 0.3 ML: at 11:15

## 2024-09-19 RX ADMIN — ONDANSETRON 4 MG: 2 INJECTION INTRAMUSCULAR; INTRAVENOUS at 12:53

## 2024-09-19 RX ADMIN — DEXAMETHASONE SODIUM PHOSPHATE 4 MG: 4 INJECTION, SOLUTION INTRAMUSCULAR; INTRAVENOUS at 12:53

## 2024-09-19 RX ADMIN — BROMFENAC 1 DROP: 0.9 SOLUTION/ DROPS OPHTHALMIC at 11:14

## 2024-09-19 RX ADMIN — TETRACAINE HYDROCHLORIDE 1 DROP: 5 SOLUTION OPHTHALMIC at 11:30

## 2024-09-19 RX ADMIN — SODIUM CHLORIDE, POTASSIUM CHLORIDE, SODIUM LACTATE AND CALCIUM CHLORIDE: 600; 310; 30; 20 INJECTION, SOLUTION INTRAVENOUS at 11:23

## 2024-09-19 ASSESSMENT — PAIN SCALES - GENERAL: PAINLEVEL_OUTOF10: 0

## 2024-09-19 ASSESSMENT — PAIN - FUNCTIONAL ASSESSMENT
PAIN_FUNCTIONAL_ASSESSMENT: 0-10
PAIN_FUNCTIONAL_ASSESSMENT: NONE - DENIES PAIN

## 2024-10-02 NOTE — PROGRESS NOTES
PRE OP INSTRUCTION SHEET   1. Do not eat or drink anything after 12 midnight  prior to surgery. This includes no water, chewing gum or mints.   2. Take the following pills will a small sip of water (see MAR)                                        3. Aspirin, Ibuprofen, Advil, Naproxen, Vitamin E, fish oil and other Anti-inflammatory products should be stopped for 5 days before surgery or as directed by your physician.   4. Check with your Doctor regarding stopping Plavix, Coumadin, Lovenox, Fragmin or other blood thinners   5. Do not smoke, and do not drink any alcoholic beverages 24 hours prior to surgery.  This includes NA Beer.   6. You may brush your teeth and gargle the morning of surgery.  DO NOT SWALLOW WATER   7. You MUST make arrangements for a responsible adult to take you home after your surgery. You will not be allowed to leave alone or drive yourself home.  It is strongly suggested someone stay with you the first 24 hrs. Your surgery will be cancelled if you do not have a ride home.   8. A parent/legal guardian must accompany a child scheduled for surgery and plan to stay at the hospital until the child is discharged.  Please do not bring other children with you.   9. Please wear simple, loose fitting clothing to the hospital.  Do not bring valuables (money, credit cards, checkbooks, etc.) Do not wear any makeup (including no eye makeup) or nail polish on your fingers or toes.   10. DO NOT wear any jewelry or piercings on day of surgery.  All body piercing jewelry must be removed.   11. If you have dentures,glasses, or contacts they will be removed before going to the OR; we will provide you a container.    12. Please see your family doctor/and cardiologist for a history & physical and/or concerning medications.  Bring any test results/reports from your physician's office. Have history and labs faxed to [phone]    13. Remember to bring Blood Bank bracelet on the day of  surgery.   14. If you have a Living Will and Durable Power of  for Healthcare, please bring in a copy.   15. Notify your Surgeon if you develop any illness between now and surgery  time, cough, cold, fever, sore throat, nausea, vomiting, etc.  Please notify your surgeon if you experience dizziness, shortness of breath or blurred vision between now & the time of your surgery   16. DO NOT shave your operative site 96 hours prior to surgery. For face & neck surgery, men may use an electric razor 48 hours prior to surgery.   17. Shower with _x__Antibacterial soap (x_chlorhexidine for total joint  Pt's) shower two times before surgery.(the morning of and the night before.   18. To provide excellent care visitors will be limited to one in the room at any given time.  Please call pre admission testing if you any further questions [phone] or [phone]

## 2024-10-03 ENCOUNTER — ANESTHESIA EVENT (OUTPATIENT)
Dept: OPERATING ROOM | Age: 69
End: 2024-10-03
Payer: MEDICARE

## 2024-10-11 ENCOUNTER — HOSPITAL ENCOUNTER (OUTPATIENT)
Age: 69
Setting detail: OUTPATIENT SURGERY
Discharge: HOME | End: 2024-10-11
Attending: OPHTHALMOLOGY | Admitting: OPHTHALMOLOGY
Payer: MEDICARE

## 2024-10-11 ENCOUNTER — ANESTHESIA (OUTPATIENT)
Dept: OPERATING ROOM | Age: 69
End: 2024-10-11
Payer: MEDICARE

## 2024-10-11 VITALS
OXYGEN SATURATION: 100 % | BODY MASS INDEX: 44.37 KG/M2 | WEIGHT: 226 LBS | HEART RATE: 60 BPM | HEIGHT: 60 IN | SYSTOLIC BLOOD PRESSURE: 118 MMHG | RESPIRATION RATE: 16 BRPM | DIASTOLIC BLOOD PRESSURE: 73 MMHG | TEMPERATURE: 97.1 F

## 2024-10-11 LAB
GLUCOSE BLD-MCNC: 79 MG/DL (ref 70–99)
PERFORMED ON: NORMAL

## 2024-10-11 PROCEDURE — 2500000003 HC RX 250 WO HCPCS: Performed by: OPHTHALMOLOGY

## 2024-10-11 PROCEDURE — 3700000000 HC ANESTHESIA ATTENDED CARE: Performed by: OPHTHALMOLOGY

## 2024-10-11 PROCEDURE — 3700000001 HC ADD 15 MINUTES (ANESTHESIA): Performed by: OPHTHALMOLOGY

## 2024-10-11 PROCEDURE — 2580000003 HC RX 258: Performed by: ANESTHESIOLOGY

## 2024-10-11 PROCEDURE — 3600000003 HC SURGERY LEVEL 3 BASE: Performed by: OPHTHALMOLOGY

## 2024-10-11 PROCEDURE — 2500000003 HC RX 250 WO HCPCS

## 2024-10-11 PROCEDURE — 2580000003 HC RX 258

## 2024-10-11 PROCEDURE — 6360000002 HC RX W HCPCS

## 2024-10-11 PROCEDURE — 2709999900 HC NON-CHARGEABLE SUPPLY: Performed by: OPHTHALMOLOGY

## 2024-10-11 PROCEDURE — 6370000000 HC RX 637 (ALT 250 FOR IP): Performed by: OPHTHALMOLOGY

## 2024-10-11 PROCEDURE — 7100000010 HC PHASE II RECOVERY - FIRST 15 MIN: Performed by: OPHTHALMOLOGY

## 2024-10-11 PROCEDURE — 3600000013 HC SURGERY LEVEL 3 ADDTL 15MIN: Performed by: OPHTHALMOLOGY

## 2024-10-11 PROCEDURE — V2632 POST CHMBR INTRAOCULAR LENS: HCPCS | Performed by: OPHTHALMOLOGY

## 2024-10-11 PROCEDURE — 2500000003 HC RX 250 WO HCPCS: Performed by: ANESTHESIOLOGY

## 2024-10-11 PROCEDURE — 6360000002 HC RX W HCPCS: Performed by: OPHTHALMOLOGY

## 2024-10-11 PROCEDURE — 7100000011 HC PHASE II RECOVERY - ADDTL 15 MIN: Performed by: OPHTHALMOLOGY

## 2024-10-11 DEVICE — LENS CLAREON IOL SY60WF 16.0D: Type: IMPLANTABLE DEVICE | Site: EYE | Status: FUNCTIONAL

## 2024-10-11 RX ORDER — SODIUM CHLORIDE, SODIUM LACTATE, POTASSIUM CHLORIDE, CALCIUM CHLORIDE 600; 310; 30; 20 MG/100ML; MG/100ML; MG/100ML; MG/100ML
INJECTION, SOLUTION INTRAVENOUS CONTINUOUS
Status: DISCONTINUED | OUTPATIENT
Start: 2024-10-11 | End: 2024-10-11 | Stop reason: HOSPADM

## 2024-10-11 RX ORDER — LABETALOL HYDROCHLORIDE 5 MG/ML
10 INJECTION, SOLUTION INTRAVENOUS
Status: DISCONTINUED | OUTPATIENT
Start: 2024-10-11 | End: 2024-10-11 | Stop reason: HOSPADM

## 2024-10-11 RX ORDER — TOBRAMYCIN AND DEXAMETHASONE 3; 1 MG/ML; MG/ML
1 SUSPENSION/ DROPS OPHTHALMIC SEE ADMIN INSTRUCTIONS
Status: DISCONTINUED | OUTPATIENT
Start: 2024-10-11 | End: 2024-10-11 | Stop reason: HOSPADM

## 2024-10-11 RX ORDER — SODIUM CHLORIDE 9 MG/ML
INJECTION, SOLUTION INTRAVENOUS PRN
Status: DISCONTINUED | OUTPATIENT
Start: 2024-10-11 | End: 2024-10-11 | Stop reason: HOSPADM

## 2024-10-11 RX ORDER — OXYCODONE HYDROCHLORIDE 5 MG/1
10 TABLET ORAL PRN
Status: DISCONTINUED | OUTPATIENT
Start: 2024-10-11 | End: 2024-10-11 | Stop reason: HOSPADM

## 2024-10-11 RX ORDER — NALOXONE HYDROCHLORIDE 0.4 MG/ML
INJECTION, SOLUTION INTRAMUSCULAR; INTRAVENOUS; SUBCUTANEOUS PRN
Status: DISCONTINUED | OUTPATIENT
Start: 2024-10-11 | End: 2024-10-11 | Stop reason: HOSPADM

## 2024-10-11 RX ORDER — SODIUM CHLORIDE 0.9 % (FLUSH) 0.9 %
5-40 SYRINGE (ML) INJECTION EVERY 12 HOURS SCHEDULED
Status: DISCONTINUED | OUTPATIENT
Start: 2024-10-11 | End: 2024-10-11 | Stop reason: HOSPADM

## 2024-10-11 RX ORDER — SODIUM CHLORIDE, POTASSIUM CHLORIDE, CALCIUM CHLORIDE, MAGNESIUM CHLORIDE, SODIUM ACETATE, AND SODIUM CITRATE 6.4; .75; .48; .3; 3.9; 1.7 MG/ML; MG/ML; MG/ML; MG/ML; MG/ML; MG/ML
SOLUTION IRRIGATION PRN
Status: DISCONTINUED | OUTPATIENT
Start: 2024-10-11 | End: 2024-10-11 | Stop reason: ALTCHOICE

## 2024-10-11 RX ORDER — SODIUM CHLORIDE 9 MG/ML
INJECTION, SOLUTION INTRAMUSCULAR; INTRAVENOUS; SUBCUTANEOUS
Status: DISCONTINUED | OUTPATIENT
Start: 2024-10-11 | End: 2024-10-11 | Stop reason: SDUPTHER

## 2024-10-11 RX ORDER — LIDOCAINE HYDROCHLORIDE 20 MG/ML
INJECTION, SOLUTION EPIDURAL; INFILTRATION; INTRACAUDAL; PERINEURAL
Status: DISCONTINUED | OUTPATIENT
Start: 2024-10-11 | End: 2024-10-11 | Stop reason: SDUPTHER

## 2024-10-11 RX ORDER — PREDNISOLONE ACETATE 10 MG/ML
1 SUSPENSION/ DROPS OPHTHALMIC SEE ADMIN INSTRUCTIONS
Status: DISCONTINUED | OUTPATIENT
Start: 2024-10-11 | End: 2024-10-11 | Stop reason: HOSPADM

## 2024-10-11 RX ORDER — PILOCARPINE HYDROCHLORIDE 20 MG/ML
SOLUTION/ DROPS OPHTHALMIC PRN
Status: DISCONTINUED | OUTPATIENT
Start: 2024-10-11 | End: 2024-10-11 | Stop reason: ALTCHOICE

## 2024-10-11 RX ORDER — DIPHENHYDRAMINE HYDROCHLORIDE 50 MG/ML
12.5 INJECTION INTRAMUSCULAR; INTRAVENOUS
Status: DISCONTINUED | OUTPATIENT
Start: 2024-10-11 | End: 2024-10-11 | Stop reason: HOSPADM

## 2024-10-11 RX ORDER — TETRACAINE HYDROCHLORIDE 5 MG/ML
SOLUTION OPHTHALMIC PRN
Status: DISCONTINUED | OUTPATIENT
Start: 2024-10-11 | End: 2024-10-11 | Stop reason: ALTCHOICE

## 2024-10-11 RX ORDER — SODIUM CHLORIDE 0.9 % (FLUSH) 0.9 %
5-40 SYRINGE (ML) INJECTION PRN
Status: DISCONTINUED | OUTPATIENT
Start: 2024-10-11 | End: 2024-10-11 | Stop reason: HOSPADM

## 2024-10-11 RX ORDER — PREDNISOLONE ACETATE 10 MG/ML
SUSPENSION/ DROPS OPHTHALMIC PRN
Status: DISCONTINUED | OUTPATIENT
Start: 2024-10-11 | End: 2024-10-11 | Stop reason: ALTCHOICE

## 2024-10-11 RX ORDER — TOBRAMYCIN AND DEXAMETHASONE 3; 1 MG/ML; MG/ML
SUSPENSION/ DROPS OPHTHALMIC PRN
Status: DISCONTINUED | OUTPATIENT
Start: 2024-10-11 | End: 2024-10-11 | Stop reason: ALTCHOICE

## 2024-10-11 RX ORDER — CYCLOPENT/TROPIC/PHEN/KETR/WAT 1 %-1%-10%
0.3 DROPS (EA) OPHTHALMIC (EYE) EVERY 10 MIN PRN
Status: COMPLETED | OUTPATIENT
Start: 2024-10-11 | End: 2024-10-11

## 2024-10-11 RX ORDER — TETRACAINE HYDROCHLORIDE 5 MG/ML
1 SOLUTION OPHTHALMIC
Status: COMPLETED | OUTPATIENT
Start: 2024-10-11 | End: 2024-10-11

## 2024-10-11 RX ORDER — OXYCODONE HYDROCHLORIDE 5 MG/1
5 TABLET ORAL PRN
Status: DISCONTINUED | OUTPATIENT
Start: 2024-10-11 | End: 2024-10-11 | Stop reason: HOSPADM

## 2024-10-11 RX ORDER — BROMFENAC 1.03 MG/ML
1 SOLUTION/ DROPS OPHTHALMIC SEE ADMIN INSTRUCTIONS
Status: DISCONTINUED | OUTPATIENT
Start: 2024-10-11 | End: 2024-10-11 | Stop reason: HOSPADM

## 2024-10-11 RX ORDER — ONDANSETRON 2 MG/ML
4 INJECTION INTRAMUSCULAR; INTRAVENOUS
Status: DISCONTINUED | OUTPATIENT
Start: 2024-10-11 | End: 2024-10-11 | Stop reason: HOSPADM

## 2024-10-11 RX ORDER — PROPOFOL 10 MG/ML
INJECTION, EMULSION INTRAVENOUS
Status: DISCONTINUED | OUTPATIENT
Start: 2024-10-11 | End: 2024-10-11 | Stop reason: SDUPTHER

## 2024-10-11 RX ORDER — BROMFENAC 1.03 MG/ML
1 SOLUTION/ DROPS OPHTHALMIC ONCE
Status: COMPLETED | OUTPATIENT
Start: 2024-10-11 | End: 2024-10-11

## 2024-10-11 RX ADMIN — PROPOFOL 50 MG: 10 INJECTION, EMULSION INTRAVENOUS at 11:05

## 2024-10-11 RX ADMIN — FAMOTIDINE 20 MG: 10 INJECTION, SOLUTION INTRAVENOUS at 10:20

## 2024-10-11 RX ADMIN — LIDOCAINE HYDROCHLORIDE 50 MG: 20 INJECTION, SOLUTION EPIDURAL; INFILTRATION; INTRACAUDAL; PERINEURAL at 11:05

## 2024-10-11 RX ADMIN — Medication 0.3 ML: at 10:07

## 2024-10-11 RX ADMIN — TETRACAINE HYDROCHLORIDE 1 DROP: 5 SOLUTION OPHTHALMIC at 10:07

## 2024-10-11 RX ADMIN — Medication 0.3 ML: at 10:25

## 2024-10-11 RX ADMIN — Medication 0.3 ML: at 10:16

## 2024-10-11 RX ADMIN — TETRACAINE HYDROCHLORIDE 1 DROP: 5 SOLUTION OPHTHALMIC at 10:16

## 2024-10-11 RX ADMIN — SODIUM CHLORIDE 5 ML: 9 INJECTION INTRAMUSCULAR; INTRAVENOUS; SUBCUTANEOUS at 11:05

## 2024-10-11 RX ADMIN — TETRACAINE HYDROCHLORIDE 1 DROP: 5 SOLUTION OPHTHALMIC at 10:25

## 2024-10-11 RX ADMIN — BROMFENAC 1 DROP: 0.9 SOLUTION/ DROPS OPHTHALMIC at 10:06

## 2024-10-11 ASSESSMENT — PAIN - FUNCTIONAL ASSESSMENT
PAIN_FUNCTIONAL_ASSESSMENT: 0-10
PAIN_FUNCTIONAL_ASSESSMENT: NONE - DENIES PAIN

## 2024-10-11 ASSESSMENT — PAIN SCALES - GENERAL: PAINLEVEL_OUTOF10: 0

## 2024-10-11 NOTE — ANESTHESIA PRE PROCEDURE
2024    PHACO EMULSIFICATION OF CATARACT WITH INTRAOCULAR LENS IMPLANT LEFT   • CYST REMOVAL Right    • EP DEVICE PROCEDURE N/A 2024    Remove & replace ICD biv multi leads performed by QUOC Hart Jr., MD at [facility] CARDIAC CATH LAB   • EP DEVICE PROCEDURE N/A 2024    Lv lead placement performed by QUOC Hart Jr., MD at [facility] CARDIAC CATH LAB   • EYE SURGERY Left 2024    PHACO EMULSIFICATION OF CATARACT WITH INTRAOCULAR LENS IMPLANT LEFT performed by Nikko Ledbetter MD at [facility] OR       Social History:    Social History     Tobacco Use   • Smoking status: Former     Current packs/day: 0.00     Average packs/day: 3.0 packs/day for 10.7 years (32.2 ttl pk-yrs)     Types: Cigarettes     Start date: 6/3/1975     Quit date: 3/2/1986     Years since quittin.6   • Smokeless tobacco: Never   Substance Use Topics   • Alcohol use: Yes     Comment: once in a while                                Counseling given: Not Answered      Vital Signs (Current):   Vitals:    10/02/24 1348 10/11/24 0950   BP:  (!) 144/54   Pulse:  67   Resp:  16   Temp:  96.9 °F (36.1 °C)   SpO2:  100%   Weight: 102.5 kg (226 lb) 102.5 kg (226 lb)   Height: 1.524 m (5') 1.524 m (5')                                              BP Readings from Last 3 Encounters:   10/11/24 (!) 144/54   24 128/69   24 135/79       NPO Status: Time of last liquid consumption:                         Time of last solid consumption:                         Date of last liquid consumption: 10/10/24                        Date of last solid food consumption: 10/10/24    BMI:   Wt Readings from Last 3 Encounters:   10/11/24 102.5 kg (226 lb)   24 102.5 kg (226 lb)   24 103.3 kg (227 lb 12.8 oz)     Body mass index is 44.14 kg/m².    CBC:   Lab Results   Component Value Date/Time    WBC 6.5 2024 11:05 AM    RBC 4.56 2024 11:05 AM    HGB 13.8 2024 11:05 AM    HCT 41.5 2024 11:05 AM    MCV

## 2024-10-11 NOTE — H&P
Surgical Service History and Physical    CHIEF COMPLAINT:   Decreased Vision and Glare    Reason for Admission:  Cataract Surgery    History Obtained From:  Patient    HISTORY OF PRESENT ILLNESS:  Pt has noticed painless progressive loss of vision and is experiencing functional difficulty.          Past Medical History:        Diagnosis Date    CAD (coronary artery disease)     Diabetes mellitus (HCC)     HTN     Sinus node dysfunction (HCC)     SVT (supraventricular tachycardia) (HCC)     Wrist fracture, closed, left, with routine healing, subsequent encounter     Compound       Past Surgical History:        Procedure Laterality Date    BREAST BIOPSY Right 2002    CATARACT EXTRACTION W/ INTRAOCULAR LENS IMPLANT Left 09/19/2024    PHACO EMULSIFICATION OF CATARACT WITH INTRAOCULAR LENS IMPLANT LEFT    CYST REMOVAL Right     EP DEVICE PROCEDURE N/A 05/23/2024    Remove & replace ICD biv multi leads performed by QUOC Hart Jr., MD at [facility] CARDIAC CATH LAB    EP DEVICE PROCEDURE N/A 05/23/2024    Lv lead placement performed by QUOC Hart Jr., MD at [facility] CARDIAC CATH LAB    EYE SURGERY Left 9/19/2024    PHACO EMULSIFICATION OF CATARACT WITH INTRAOCULAR LENS IMPLANT LEFT performed [place] Nikko Ledbetter [place] [facility] [place]       Allergies:  Chlorthalidone    Prior to Admission medications    Medication Sig Start Date End Date Taking? Authorizing Provider   spironolactone (ALDACTONE) 25 MG tablet Take 1 tablet by mouth daily 8/2/24   Fabby Hyman APRN - CNP   meloxicam (MOBIC) 7.5 MG tablet TAKE ONE (1) TABLET ONCE DAILY 7/31/24   Luis Manuel Crocker MD   lisinopril (PRINIVIL;ZESTRIL) 40 MG tablet TAKE ONE (1) TABLET ONCE DAILY 7/31/24   Luis Manuel Crocker MD   metoprolol succinate (TOPROL XL) 100 MG extended release tablet TAKE ONE (1) AND ONE HALF (1/2) TABLET BY MOUTH ONCE DAILY 7/3/24   Fabby Hyman APRN - CNP   solifenacin (VESICARE) 5 MG tablet TAKE ONE (1) TABLET ONCE DAILY 6/24/24   Luis Manuel Crocker

## 2024-10-11 NOTE — OP NOTE
OPERATIVE NOTE    Patient Name   Date of Birth Age  MRNJoanie Mcleod   1955   69 y.o.  [id number]       Surgeon        Surgery Date  Nikko Ledbetter MD        10/11/2024       Preoperative Diagnosis:  Nuclear Sclerotic Cataract right eye    Postoperative Diagnosis: Nuclear Sclerotic Cataract right eye    Operative Procedure: Phacoemulsification/ Posterior Chamber Intraocular Lens Implantation          Anesthesia:   Peribulbar Block with MAC    Details of Procedure:    The patient was brought to the operating room on the operative stretcher in the supine position with the head resting in the head rest.  After appropriate anesthesia monitoring devices were applied, IV sedation was carried out per the anesthesia service.  Once sedation was achieved, a peribulbar block was performed, using a 5 mL combination solution containing 4 mL of 2% lidocaine with 1 mL of Vitrase.  Approximately 2-3 mL of this solution was administered in a peribulbar fashion through the lower lid of the operative eye.    Once appropriate akinesia and anesthesia were demonstrated, the operative eye was prepped and draped in the usual sterile fashion.  Tobradex drops and Tetracain drops were administered.  A wire lid speculum was then inserted into the operative eye.  A paracentesis was then performed using a 15 degree supersharp blade to enter the globe at the limbus, and a .12 mm colibri forceps was used to stabilize the globe.  Viscoat was then instilled into the anterior chamber.  A temporal clear cornea groove was then created using the 15 degree supersharp blade.  The cornea was then entered using the Enzo 2.4 mm clearcut keratome blade.  The capsulotomy was then performed using a cystotome, and the capsulorrhexis was completed using uttrata forceps.  The nucleus of the cataract was then hydrodissected and hydrodelineated using sterile BSS on a 27 gauge cannula.  The nucleus of the cataract was then removed using the  present):  No infection present  Other Findings:       Electronically signed by Nikko Ledbetter MD on 10/11/2024 at 11:04 AM

## 2024-10-11 NOTE — ANESTHESIA POSTPROCEDURE EVALUATION
Department of Anesthesiology  Postprocedure Note    Patient: Brenda Mcleod  MRN: [id number]  YOB: 1955  Date of evaluation: 10/11/2024    Procedure Summary       Date: 10/11/24 Room / Location: [address]    Anesthesia Start: 1101 Anesthesia Stop: 1137    Procedure: PHACO EMULSIFICATION OF CATARACT WITH INTRAOCULAR LENS IMPLANT RIGHT EYE (Right: Eye) Diagnosis:       Cataract of right eye, unspecified cataract type      (Cataract of right eye, unspecified cataract type [H26.9])    Surgeons: Nikko Ledbetter MD Responsible Provider: Alok Cee MD    Anesthesia Type: MAC ASA Status: 3            Anesthesia Type: No value filed.    Fouzia Phase I: Fouzia Score: 10    Fouzia Phase II: Fouzia Score: 10    Anesthesia Post Evaluation    Patient location during evaluation: PACU  Patient participation: complete - patient participated  Level of consciousness: awake and alert  Airway patency: patent  Nausea & Vomiting: no nausea and no vomiting  Cardiovascular status: blood pressure returned to baseline  Respiratory status: acceptable  Hydration status: euvolemic  Comments: VSS on transfer to phase 2 recovery.  No anesthetic complications.  Pain management: adequate    No notable events documented.

## 2024-10-11 NOTE — PROGRESS NOTES
Pre-Operative:  1.  Patient/Caregiver identifies - states name and date of birth.  2.  The patient is free from signs and symptoms of injury.  3.  The patient receives appropriate medication(s), safely administered during the Perioperative period.  4.  The patient is free from signs and symptoms of infection.  5.  The patient has wound / tissue perfusion.  6.  The patients's fluid, electrolyte, and acid-base balances are established preoperatively.  7.  The patient's pulmonary function is established preoperatively.  8.  The patient's cardiovascular status is established preoperatively.  9.  The patient / caregiver demonstrates knowledge of nutritional management related to the operative or other invasive procedure.  10.  The patient/caregiver demonstrates knowledge of medication management.  11.  The patient/caregiver demonstrates knowledge of pain management.  12.  The patient participates in the rehabilitation process as applicable.  13.  The patient/caregiver participates in decisions affection his or her Perioperative plan of care.  14.  The patient's care is consistent with the individualized Perioperative plan of care.  15.  The patient's right to privacy is maintained.  16.  The patient is the recipient of competent and ethical care within legal standards of practice.  17.  The patient's value system, lifestyle, ethnicity, and culture are considered, respected, and incorporated in the Perioperative plan of care and understands special services available.  18.  The patient demonstrates and/or reports adequate pain control throughout the the Perioperative period.  19.  The patient's neurological status is established preoperatively.  20.  The patient/caregiver demonstrates knowledge of the expected responses to the operative or invasive procedure.  21.  Patient/Caregiver has reduced anxiety.  Interventions- Familiarize with environment and equipment.  22. Patient/Caregiver verbalizes understanding of Phase I  and Phase II process.  23.  Patient pain level is established preoperatively using age appropriate pain scale.  24.  The patient will move to fall risk upon sedation- during and through the recovery phase.  Interventions- orient the patient to the environment, especially the location of the bathroom; provide treaded socks/non-skid footwear; demonstrate and teach back use of the nurse's call system; instruct the patient to call for help before getting out of bed; lock all movable equipment before transferring patient; keep bed in lowest position possible.

## 2024-10-11 NOTE — DISCHARGE INSTRUCTIONS
General Instructions After Surgery  (For One Week)    Nikko Ledbetter M.D.  Office [phone]  Office  [phone]    1. Tobradex eye drops: one drop every two hours while awake start at 1:00PM       Continue one drop every two hours until seen in office at 3:10PM on 10/11/24    2. Bring all eye medications to the office with you today/tomorrow.    3. It is important to remember not to bend your head below your waist (you can squat or bend down on one knee, keeping your head upright.)    4. DO NOT LIFT objects heavier than 10 pounds, which is equal to a gallon of milk.    5. You can wash your face, but avoid the area around your operated eye. We recommend a partial bath today and tomorrow. When you resume normal bathing, be sure to turn your back in the shower avoiding water and shampoo from running into your operated eye. Avoid washing your hair for the first three days.    6. Lie on the un operated side while sleeping, or on your back.    7. PROTECT YOUR OPERATED EYE Wear your shield at bedtime at all times. During the day be sure to wear your glasses (or sunglasses provided) if you leave home. Remember your glasses will make your vision blurry in your operated eye, but will not in anyway damage your eye.    8. NEVER RUB OR PUSH ON YOUR EYE This is the worst thing that you can do while it is healing.    9. It is normal for the white part of the eye to appear red or bloodshot. DO NOT WORRY, this will clear in several weeks.    10. You should have NO PAIN after surgery. The eye may feel slightly irritated at first and (2) Tylenol may be used. If you have actual pain, increasing discomfort or sudden decrease in vision call us immediately.    11. Temporary \"floaters\" are not uncommon immediately after surgery, these will disappear. Your vision should gradually improve. Notify us immediately if you notice a shower of new floaters or flashes develop.    ANESTHESIA DISCHARGE INSTRUCTIONS    You are under the

## 2024-10-14 ENCOUNTER — TELEPHONE (OUTPATIENT)
Dept: CARDIOLOGY CLINIC | Age: 69
End: 2024-10-14

## 2024-10-14 NOTE — TELEPHONE ENCOUNTER
Pt sts she is experiencing a THUMPING, in  arm pit area. This seems to always happen at 2:44 AM, it wakes pt up. Over the weekend this happened and pt sent 2 transmission each time it did. Pt sts little chest discomfort, almost like muscle spasm. Pt was told by NPKK to trak these experiences. Pt sts happened Sept 17 ,23 and 25.  October 3, 4,5,8,9,11,12,13,14.  This last 2-3 min. Pt did not send transmissions with each episode. Please advise.

## 2024-10-14 NOTE — TELEPHONE ENCOUNTER
I have reviewed.  Please let patient know no arrhythmias on her device to explain her symptoms.  I am sorry she is not feeling well however no arrhythmias to explain.

## 2024-10-14 NOTE — TELEPHONE ENCOUNTER
Transmission received - no arrhythmias recorded and device trends stable. Interrogation sent to K for sign off via Murj.

## 2024-10-15 RX ORDER — TERAZOSIN 5 MG/1
CAPSULE ORAL
Qty: 30 CAPSULE | Refills: 5 | Status: SHIPPED | OUTPATIENT
Start: 2024-10-15

## 2024-10-15 RX ORDER — EMPAGLIFLOZIN 10 MG/1
10 TABLET, FILM COATED ORAL DAILY
Qty: 30 TABLET | Refills: 3 | Status: SHIPPED | OUTPATIENT
Start: 2024-10-15

## 2024-10-30 RX ORDER — MELOXICAM 7.5 MG/1
TABLET ORAL
Qty: 90 TABLET | Refills: 0 | Status: SHIPPED | OUTPATIENT
Start: 2024-10-30

## 2024-10-30 RX ORDER — LISINOPRIL 40 MG/1
40 TABLET ORAL DAILY
Qty: 90 TABLET | Refills: 0 | Status: SHIPPED | OUTPATIENT
Start: 2024-10-30

## 2024-11-18 ENCOUNTER — HOSPITAL ENCOUNTER (OUTPATIENT)
Age: 69
Discharge: HOME | End: 2024-11-20
Payer: MEDICARE

## 2024-11-18 VITALS
SYSTOLIC BLOOD PRESSURE: 118 MMHG | HEIGHT: 60 IN | DIASTOLIC BLOOD PRESSURE: 73 MMHG | BODY MASS INDEX: 44.37 KG/M2 | WEIGHT: 226 LBS

## 2024-11-18 DIAGNOSIS — I50.22 CHRONIC SYSTOLIC CONGESTIVE HEART FAILURE (HCC): ICD-10-CM

## 2024-11-18 LAB
ECHO AO ASC DIAM: 3.2 CM
ECHO AO ASCENDING AORTA INDEX: 1.62 CM/M2
ECHO AO ROOT DIAM: 2.7 CM
ECHO AO ROOT INDEX: 1.37 CM/M2
ECHO AV MEAN GRADIENT: 5 MMHG
ECHO AV MEAN VELOCITY: 1 M/S
ECHO AV PEAK GRADIENT: 9 MMHG
ECHO AV PEAK VELOCITY: 1.5 M/S
ECHO AV VELOCITY RATIO: 0.53
ECHO AV VTI: 33.9 CM
ECHO BSA: 2.08 M2
ECHO EST RA PRESSURE: 3 MMHG
ECHO IVC EXP: 1.2 CM
ECHO LA AREA 2C: 17.7 CM2
ECHO LA AREA 4C: 15.6 CM2
ECHO LA MAJOR AXIS: 5.2 CM
ECHO LA MINOR AXIS: 5 CM
ECHO LA VOL BP: 46 ML (ref 22–52)
ECHO LA VOL MOD A2C: 51 ML (ref 22–52)
ECHO LA VOL MOD A4C: 39 ML (ref 22–52)
ECHO LA VOL/BSA BIPLANE: 23 ML/M2 (ref 16–34)
ECHO LA VOLUME INDEX MOD A2C: 26 ML/M2 (ref 16–34)
ECHO LA VOLUME INDEX MOD A4C: 20 ML/M2 (ref 16–34)
ECHO LV E' LATERAL VELOCITY: 4.78 CM/S
ECHO LV E' SEPTAL VELOCITY: 4.62 CM/S
ECHO LV EF PHYSICIAN: 53 %
ECHO LV FRACTIONAL SHORTENING: 23 % (ref 28–44)
ECHO LV INTERNAL DIMENSION DIASTOLE INDEX: 2.69 CM/M2
ECHO LV INTERNAL DIMENSION DIASTOLIC: 5.3 CM (ref 3.9–5.3)
ECHO LV INTERNAL DIMENSION SYSTOLIC INDEX: 2.08 CM/M2
ECHO LV INTERNAL DIMENSION SYSTOLIC: 4.1 CM
ECHO LV ISOVOLUMETRIC RELAXATION TIME (IVRT): 115 MS
ECHO LV IVSD: 1.1 CM (ref 0.6–0.9)
ECHO LV MASS 2D: 227.7 G (ref 67–162)
ECHO LV MASS INDEX 2D: 115.6 G/M2 (ref 43–95)
ECHO LV POSTERIOR WALL DIASTOLIC: 1.1 CM (ref 0.6–0.9)
ECHO LV RELATIVE WALL THICKNESS RATIO: 0.42
ECHO LVOT AV VTI INDEX: 0.58
ECHO LVOT MEAN GRADIENT: 2 MMHG
ECHO LVOT PEAK GRADIENT: 3 MMHG
ECHO LVOT PEAK VELOCITY: 0.8 M/S
ECHO LVOT VTI: 19.7 CM
ECHO MV A VELOCITY: 0.92 M/S
ECHO MV E DECELERATION TIME (DT): 327 MS
ECHO MV E VELOCITY: 0.61 M/S
ECHO MV E/A RATIO: 0.66
ECHO MV E/E' LATERAL: 12.76
ECHO MV E/E' RATIO (AVERAGED): 12.98
ECHO MV E/E' SEPTAL: 13.2
ECHO MV LVOT VTI INDEX: 1.25
ECHO MV MAX VELOCITY: 1 M/S
ECHO MV MEAN GRADIENT: 1 MMHG
ECHO MV MEAN VELOCITY: 0.6 M/S
ECHO MV PEAK GRADIENT: 4 MMHG
ECHO MV VTI: 24.7 CM
ECHO PV MAX VELOCITY: 0.9 M/S
ECHO PV MEAN GRADIENT: 2 MMHG
ECHO PV MEAN VELOCITY: 0.6 M/S
ECHO PV PEAK GRADIENT: 3 MMHG
ECHO PV VTI: 20 CM
ECHO RA AREA 4C: 16.5 CM2
ECHO RA END SYSTOLIC VOLUME APICAL 4 CHAMBER INDEX BSA: 19 ML/M2
ECHO RA VOLUME: 38 ML
ECHO RIGHT VENTRICULAR SYSTOLIC PRESSURE (RVSP): 25 MMHG
ECHO RV BASAL DIMENSION: 3.6 CM
ECHO RV FREE WALL PEAK S': 15.2 CM/S
ECHO RV LONGITUDINAL DIMENSION: 6.4 CM
ECHO RV MID DIMENSION: 3.5 CM
ECHO RV TAPSE: 2.3 CM (ref 1.7–?)
ECHO TV REGURGITANT MAX VELOCITY: 2.37 M/S
ECHO TV REGURGITANT PEAK GRADIENT: 22 MMHG

## 2024-11-18 PROCEDURE — 93306 TTE W/DOPPLER COMPLETE: CPT

## 2024-11-18 PROCEDURE — 93306 TTE W/DOPPLER COMPLETE: CPT | Performed by: INTERNAL MEDICINE

## 2024-11-19 SDOH — ECONOMIC STABILITY: FOOD INSECURITY: WITHIN THE PAST 12 MONTHS, THE FOOD YOU BOUGHT JUST DIDN'T LAST AND YOU DIDN'T HAVE MONEY TO GET MORE.: NEVER TRUE

## 2024-11-19 SDOH — ECONOMIC STABILITY: INCOME INSECURITY: HOW HARD IS IT FOR YOU TO PAY FOR THE VERY BASICS LIKE FOOD, HOUSING, MEDICAL CARE, AND HEATING?: NOT VERY HARD

## 2024-11-19 SDOH — ECONOMIC STABILITY: FOOD INSECURITY: WITHIN THE PAST 12 MONTHS, YOU WORRIED THAT YOUR FOOD WOULD RUN OUT BEFORE YOU GOT MONEY TO BUY MORE.: NEVER TRUE

## 2024-11-19 SDOH — ECONOMIC STABILITY: TRANSPORTATION INSECURITY
IN THE PAST 12 MONTHS, HAS LACK OF TRANSPORTATION KEPT YOU FROM MEETINGS, WORK, OR FROM GETTING THINGS NEEDED FOR DAILY LIVING?: NO

## 2024-11-20 ENCOUNTER — OFFICE VISIT (OUTPATIENT)
Dept: FAMILY MEDICINE CLINIC | Age: 69
End: 2024-11-20

## 2024-11-20 VITALS
TEMPERATURE: 97.7 F | WEIGHT: 228 LBS | HEART RATE: 86 BPM | SYSTOLIC BLOOD PRESSURE: 126 MMHG | DIASTOLIC BLOOD PRESSURE: 84 MMHG | OXYGEN SATURATION: 98 % | BODY MASS INDEX: 44.53 KG/M2

## 2024-11-20 DIAGNOSIS — G47.33 OSA ON CPAP: ICD-10-CM

## 2024-11-20 DIAGNOSIS — I10 ESSENTIAL HYPERTENSION: ICD-10-CM

## 2024-11-20 DIAGNOSIS — I49.5 SINUS NODE DYSFUNCTION (HCC): ICD-10-CM

## 2024-11-20 DIAGNOSIS — I42.9 CARDIOMYOPATHY, UNSPECIFIED TYPE (HCC): Primary | ICD-10-CM

## 2024-11-20 DIAGNOSIS — E78.00 HYPERCHOLESTEREMIA: ICD-10-CM

## 2024-11-20 DIAGNOSIS — Z23 NEED FOR INFLUENZA VACCINATION: ICD-10-CM

## 2024-11-20 DIAGNOSIS — I51.89 DIASTOLIC DYSFUNCTION: ICD-10-CM

## 2024-11-20 DIAGNOSIS — Z95.0 PACEMAKER: ICD-10-CM

## 2024-11-20 DIAGNOSIS — R73.9 HYPERGLYCEMIA: ICD-10-CM

## 2024-11-20 SDOH — ECONOMIC STABILITY: FOOD INSECURITY

## 2024-11-20 SDOH — ECONOMIC STABILITY: INCOME INSECURITY

## 2024-11-20 NOTE — PATIENT INSTRUCTIONS
Please read the healthy family handout that you were given and share it with your family.       Please compare this printed medication list with your medications at home to be sure they are the same.  If you have any medications that are different please contact us immediately at [phone].     Also review your allergies that we have listed, these may also include medications that you have not been able to tolerate, make sure everything listed is correct. If you have any allergies that are different please contact us immediately at [phone].     You may receive a survey in the mail or by email asking about your experience during your visit today. Please complete and return to us so we know how we are serving you.

## 2024-11-20 NOTE — PROGRESS NOTES
Total time spent on the day of this patient's office encounter (including before, during, and after the face-to-face visit): 49 minutes    Subjective:  Brenda Mcleod is here to discuss the following issues.  She has a long history of cardiac myopathy and several years ago had a pacemaker inserted for SA node dysfunction.  More recently she had cardiac workup and ejection fraction was at approximately 40%.  It was felt that a new pacemaker may treat both her SA node dysfunction and systolic dysfunction.  This was performed in May and she has recovered well and subsequent echocardiogram shows an ejection fraction of 50 to 55%.  No complications.  SA node dysfunction is well-controlled and remote pacemaker readings have been excellent.  No chest pain pressure lightheadedness diaphoresis nausea vomiting.  No edema.  She has increased exercise tolerance.  She has diastolic dysfunction hypertension blood pressures are well-controlled on a combination of medications.  She has elevated cholesterol and is fasting for blood work and tries to follow an appropriate diet.  Weight is stable.  She continues on Lipitor with no muscle aches or other side effects.  She has obstructive sleep apnea and uses CPAP faithfully and this is significant improved her energy level and sleep quality.  She has hyperglycemia with no polydipsia or polyuria.  She requests a flu shot.  Today we reviewed findings and recommendations from sleep apnea specialist visit in August cardiology visit in August echocardiogram in November hospitalization and discharge in May hospital follow-up visit in our office in May and numerous visits regarding her visual changes and diagnosis and treatment of cataracts.  Vision is now excellent.  Social History     Tobacco Use   Smoking Status Former    Current packs/day: 0.00    Average packs/day: 3.0 packs/day for 10.7 years (32.2 ttl pk-yrs)    Types: Cigarettes    Start date: 6/3/1975    Quit date: 3/2/1986

## 2024-11-21 DIAGNOSIS — I10 ESSENTIAL HYPERTENSION: Primary | ICD-10-CM

## 2024-11-21 DIAGNOSIS — E78.00 HYPERCHOLESTEREMIA: Primary | ICD-10-CM

## 2024-11-21 LAB
ALT SERPL-CCNC: 65 U/L (ref 10–40)
ANION GAP SERPL CALCULATED.3IONS-SCNC: 12 MMOL/L (ref 3–16)
BUN SERPL-MCNC: 17 MG/DL (ref 7–20)
CALCIUM SERPL-MCNC: 9.6 MG/DL (ref 8.3–10.6)
CHLORIDE SERPL-SCNC: 105 MMOL/L (ref 99–110)
CHOLEST SERPL-MCNC: 178 MG/DL (ref 0–199)
CO2 SERPL-SCNC: 24 MMOL/L (ref 21–32)
CREAT SERPL-MCNC: 1.1 MG/DL (ref 0.6–1.2)
EST. AVERAGE GLUCOSE BLD GHB EST-MCNC: 99.7 MG/DL
GFR SERPLBLD CREATININE-BSD FMLA CKD-EPI: 55 ML/MIN/{1.73_M2}
GLUCOSE SERPL-MCNC: 83 MG/DL (ref 70–99)
HBA1C MFR BLD: 5.1 %
HDLC SERPL-MCNC: 54 MG/DL (ref 40–60)
LDLC SERPL CALC-MCNC: 104 MG/DL
POTASSIUM SERPL-SCNC: 4.3 MMOL/L (ref 3.5–5.1)
SODIUM SERPL-SCNC: 141 MMOL/L (ref 136–145)
TRIGL SERPL-MCNC: 100 MG/DL (ref 0–150)
VLDLC SERPL CALC-MCNC: 20 MG/DL

## 2024-11-21 NOTE — RESULT ENCOUNTER NOTE
Please call patient with echocardiogram results.  Echo shows heart function has significantly improved and now normal.    Continue current regimen. Keep follow up as planned

## 2024-12-18 RX ORDER — SOLIFENACIN SUCCINATE 5 MG/1
TABLET, FILM COATED ORAL
Qty: 30 TABLET | Refills: 5 | Status: SHIPPED | OUTPATIENT
Start: 2024-12-18

## 2024-12-20 ENCOUNTER — NURSE ONLY (OUTPATIENT)
Dept: FAMILY MEDICINE CLINIC | Age: 69
End: 2024-12-20
Payer: MEDICARE

## 2024-12-20 DIAGNOSIS — I10 ESSENTIAL HYPERTENSION: ICD-10-CM

## 2024-12-20 DIAGNOSIS — E78.00 HYPERCHOLESTEREMIA: ICD-10-CM

## 2024-12-20 LAB
ALT SERPL-CCNC: 47 U/L (ref 10–40)
ANION GAP SERPL CALCULATED.3IONS-SCNC: 10 MMOL/L (ref 3–16)
AST SERPL-CCNC: 42 U/L (ref 15–37)
BUN SERPL-MCNC: 20 MG/DL (ref 7–20)
CALCIUM SERPL-MCNC: 9.5 MG/DL (ref 8.3–10.6)
CHLORIDE SERPL-SCNC: 109 MMOL/L (ref 99–110)
CO2 SERPL-SCNC: 25 MMOL/L (ref 21–32)
CREAT SERPL-MCNC: 1.2 MG/DL (ref 0.6–1.2)
GFR SERPLBLD CREATININE-BSD FMLA CKD-EPI: 49 ML/MIN/{1.73_M2}
GLUCOSE SERPL-MCNC: 87 MG/DL (ref 70–99)
POTASSIUM SERPL-SCNC: 4.5 MMOL/L (ref 3.5–5.1)
SODIUM SERPL-SCNC: 144 MMOL/L (ref 136–145)

## 2024-12-20 PROCEDURE — 36415 COLL VENOUS BLD VENIPUNCTURE: CPT | Performed by: FAMILY MEDICINE

## 2024-12-20 NOTE — PROGRESS NOTES
Blood drawn per order.  Needle size: 21 g  Site: R Antecubital.  First attempt successful Yes    Second attempt no    Pressure applied until bleeding stopped.  Gauze and tape applied.    Patient informed to call office or return if bleeding reoccurs and unable to stop.    Tubes drawn: 0 purple     1 red

## 2024-12-21 DIAGNOSIS — I10 ESSENTIAL HYPERTENSION: Primary | ICD-10-CM

## 2025-01-18 RX ORDER — ATORVASTATIN CALCIUM 40 MG/1
TABLET, FILM COATED ORAL
Qty: 30 TABLET | Refills: 5 | Status: SHIPPED | OUTPATIENT
Start: 2025-01-18

## 2025-01-23 RX ORDER — EMPAGLIFLOZIN 10 MG/1
10 TABLET, FILM COATED ORAL DAILY
Qty: 30 TABLET | Refills: 3 | Status: SHIPPED | OUTPATIENT
Start: 2025-01-23

## 2025-01-23 NOTE — TELEPHONE ENCOUNTER
Patient last seen on 8/2/24. Advised to follow up 6 months. Next appointment 2/7/25.       Lab Results   Component Value Date     12/20/2024    K 4.5 12/20/2024     12/20/2024    CO2 25 12/20/2024    BUN 20 12/20/2024    CREATININE 1.2 12/20/2024    GLUCOSE 87 12/20/2024    CALCIUM 9.5 12/20/2024    BILITOT 0.8 09/13/2024    ALKPHOS 109 09/13/2024    AST 42 (H) 12/20/2024    ALT 47 (H) 12/20/2024    LABGLOM 49 (A) 12/20/2024    GFRAA >60 09/06/2022    AGRATIO 1.7 09/13/2024    GLOB 3.1 11/10/2017

## 2025-01-24 ENCOUNTER — NURSE ONLY (OUTPATIENT)
Dept: FAMILY MEDICINE CLINIC | Age: 70
End: 2025-01-24
Payer: MEDICARE

## 2025-01-24 DIAGNOSIS — I10 ESSENTIAL HYPERTENSION: ICD-10-CM

## 2025-01-24 LAB
ANION GAP SERPL CALCULATED.3IONS-SCNC: 8 MMOL/L (ref 3–16)
BUN SERPL-MCNC: 19 MG/DL (ref 7–20)
CALCIUM SERPL-MCNC: 9.6 MG/DL (ref 8.3–10.6)
CHLORIDE SERPL-SCNC: 107 MMOL/L (ref 99–110)
CO2 SERPL-SCNC: 27 MMOL/L (ref 21–32)
CREAT SERPL-MCNC: 1.2 MG/DL (ref 0.6–1.2)
GFR SERPLBLD CREATININE-BSD FMLA CKD-EPI: 49 ML/MIN/{1.73_M2}
GLUCOSE SERPL-MCNC: 86 MG/DL (ref 70–99)
POTASSIUM SERPL-SCNC: 4.5 MMOL/L (ref 3.5–5.1)
SODIUM SERPL-SCNC: 142 MMOL/L (ref 136–145)

## 2025-01-24 PROCEDURE — 36415 COLL VENOUS BLD VENIPUNCTURE: CPT | Performed by: FAMILY MEDICINE

## 2025-01-27 DIAGNOSIS — N28.9 ABNORMAL KIDNEY FUNCTION: Primary | ICD-10-CM

## 2025-01-27 RX ORDER — LISINOPRIL 40 MG/1
40 TABLET ORAL DAILY
Qty: 90 TABLET | Refills: 0 | Status: SHIPPED | OUTPATIENT
Start: 2025-01-27

## 2025-01-27 NOTE — TELEPHONE ENCOUNTER
LOV RBNP 8/2/24  NOV 2/7/25    Lab Results   Component Value Date/Time     01/24/2025 09:27 AM    K 4.5 01/24/2025 09:27 AM    K 4.1 12/12/2023 07:55 AM     01/24/2025 09:27 AM    CO2 27 01/24/2025 09:27 AM    BUN 19 01/24/2025 09:27 AM    CREATININE 1.2 01/24/2025 09:27 AM    GLUCOSE 86 01/24/2025 09:27 AM    CALCIUM 9.6 01/24/2025 09:27 AM    LABGLOM 49 01/24/2025 09:27 AM    LABGLOM >60 02/16/2024 09:14 AM

## 2025-01-28 RX ORDER — SPIRONOLACTONE 25 MG/1
25 TABLET ORAL DAILY
Qty: 90 TABLET | Refills: 0 | Status: SHIPPED | OUTPATIENT
Start: 2025-01-28

## 2025-02-07 ENCOUNTER — OFFICE VISIT (OUTPATIENT)
Dept: CARDIOLOGY CLINIC | Age: 70
End: 2025-02-07

## 2025-02-07 ENCOUNTER — HOSPITAL ENCOUNTER (OUTPATIENT)
Dept: GENERAL RADIOLOGY | Age: 70
Discharge: HOME | End: 2025-02-07
Payer: MEDICARE

## 2025-02-07 ENCOUNTER — HOSPITAL ENCOUNTER (OUTPATIENT)
Age: 70
Discharge: HOME | End: 2025-02-07
Payer: MEDICARE

## 2025-02-07 VITALS
WEIGHT: 227 LBS | DIASTOLIC BLOOD PRESSURE: 68 MMHG | HEART RATE: 62 BPM | BODY MASS INDEX: 44.57 KG/M2 | OXYGEN SATURATION: 100 % | SYSTOLIC BLOOD PRESSURE: 118 MMHG | HEIGHT: 60 IN

## 2025-02-07 DIAGNOSIS — I49.5 SINUS NODE DYSFUNCTION (HCC): ICD-10-CM

## 2025-02-07 DIAGNOSIS — I50.22 CHRONIC SYSTOLIC CONGESTIVE HEART FAILURE (HCC): ICD-10-CM

## 2025-02-07 DIAGNOSIS — Z95.0 CARDIAC RESYNCHRONIZATION THERAPY PACEMAKER (CRT-P) IN PLACE: Primary | ICD-10-CM

## 2025-02-07 DIAGNOSIS — Z95.0 CARDIAC RESYNCHRONIZATION THERAPY PACEMAKER (CRT-P) IN PLACE: ICD-10-CM

## 2025-02-07 PROCEDURE — 71046 X-RAY EXAM CHEST 2 VIEWS: CPT

## 2025-02-07 NOTE — PROGRESS NOTES
[facility]  Cardiac Follow-up    Primary Care Doctor:  Luis Manuel Crocker MD    Chief Complaint   Patient presents with    6 Month Follow-Up     Feels throbbing in chest, Started in Aug 2024, Happens at 1:45 AM    Hypertension    Cardiomyopathy        History of Present Illness:  I had the pleasure of seeing Brenda Mcleod in follow up for Cardiomyopathy.     Hx of SND s/p Pacemaker 11/2027, Syncope, PAF, HTN, ANNA. Echocardiogram 10/2023 showed mildly reduced EF 40-45%; s/p LHC 12/12/23 showing mild to moderated non-obstructive CAD    Since last visit, echo completed 11/2024 showing improved and now normal LVEF.   She is having palpitations in the middle of the night about 11-12 times a month around 145 am.   Sleeps in a recliner.   If she sleeps with her left arm across her chest then it helps the thumping she feels in the left side of the chest. Breathing has been stable. No BLE edema.     Brenda Mcleod describes symptoms including palpitations but denies chest pain, chest pressure/discomfort, dyspnea on exertion, orthopnea.     Taking medications as prescribed: Yes  Able to afford medications: No    Past Medical History:   has a past medical history of CAD (coronary artery disease), Diabetes mellitus (HCC), HTN, Sinus node dysfunction (HCC), SVT (supraventricular tachycardia) (HCC), and Wrist fracture, closed, left, with routine healing, subsequent encounter.  Surgical History:   has a past surgical history that includes Breast biopsy (Right, 2002); cyst removal (Right); ep device procedure (N/A, 05/23/2024); ep device procedure (N/A, 05/23/2024); Cataract extraction w/ intraocular lens implant (Left, 09/19/2024); Eye surgery (Left, 09/19/2024); other surgical history (10/11/2024); and Eye surgery (Right, 10/11/2024).   Social History:   reports that she quit smoking about 38 years ago. Her smoking use included cigarettes. She started smoking about 49 years ago. She has a 32.2 pack-year smoking

## 2025-02-07 NOTE — PATIENT INSTRUCTIONS
Plan:   Check daily weights  Continue lisinopril 40mg daily   Continue toprol 150mg daily   Jardiance 10mg daily   Spironolactone (aldactone) 25 mg daily  Chest xray evaluate leads   Follow up with EP as planned with device check   Follow up with CHF team 6 months

## 2025-03-04 RX ORDER — METOPROLOL SUCCINATE 100 MG/1
TABLET, EXTENDED RELEASE ORAL
Qty: 135 TABLET | Refills: 3 | Status: SHIPPED | OUTPATIENT
Start: 2025-03-04

## 2025-03-21 ENCOUNTER — RESULTS FOLLOW-UP (OUTPATIENT)
Dept: WOMENS IMAGING | Age: 70
End: 2025-03-21

## 2025-03-21 ENCOUNTER — HOSPITAL ENCOUNTER (OUTPATIENT)
Dept: WOMENS IMAGING | Age: 70
Discharge: HOME | End: 2025-03-21
Payer: MEDICARE

## 2025-03-21 VITALS — WEIGHT: 226 LBS | HEIGHT: 60 IN | BODY MASS INDEX: 44.37 KG/M2

## 2025-03-21 DIAGNOSIS — Z12.31 ENCOUNTER FOR SCREENING MAMMOGRAM FOR MALIGNANT NEOPLASM OF BREAST: ICD-10-CM

## 2025-03-21 PROCEDURE — 77063 BREAST TOMOSYNTHESIS BI: CPT

## 2025-04-04 NOTE — PROGRESS NOTES
DAILY  Patient taking differently: 150 mg daily 3/4/25  Yes Fabby Hyman APRN - CNP   spironolactone (ALDACTONE) 25 MG tablet TAKE ONE (1) TABLET BY MOUTH DAILY 1/28/25  Yes Fabby Hyman APRN - CNP   lisinopril (PRINIVIL;ZESTRIL) 40 MG tablet TAKE ONE (1) TABLET BY MOUTH ONCE DAILY 1/27/25  Yes Luis Manuel Crocker MD   JARDIANCE 10 MG tablet TAKE ONE (1) TABLET BY MOUTH DAILY 1/23/25  Yes Enzweiler, Diane M, APRN - CNP   atorvastatin (LIPITOR) 40 MG tablet TAKE (1) TABLET DAILY 1/18/25  Yes Luis Manuel Crocker MD   solifenacin (VESICARE) 5 MG tablet TAKE ONE (1) TABLET ONCE DAILY 12/18/24  Yes Luis Manuel Crocker MD   augmented betamethasone dipropionate (DIPROLENE AF) 0.05 % cream APPLY TWICE A DAY TO THE AFFECTED AREA(S)  Patient taking differently: PRN 2/14/23  Yes Luis Manuel Crocker MD   aspirin 81 MG EC tablet Take 1 tablet by mouth daily   Yes ProviderPan MD       Allergies:  Chlorthalidone    Social History:   reports that she quit smoking about 39 years ago. Her smoking use included cigarettes. She started smoking about 49 years ago. She has a 32.2 pack-year smoking history. She has never used smokeless tobacco. She reports current alcohol use. She reports that she does not use drugs.     Family History: family history includes Heart Attack in her maternal grandmother; Hypertension in her father; Parkinsonism in her father.    All 14 point review of systems are completed and pertinent positives are mentioned in the history of present illness. Other systems are reviewed and are negative.     PHYSICAL EXAM:    Vital signs:    /78   Pulse (!) 141   Ht 1.524 m (5')   Wt 102.6 kg (226 lb 3.1 oz)   SpO2 95%   BMI 44.18 kg/m²      Constitutional and general appearance: alert, cooperative, no distress, and appears stated age  HEENT: PERRL, no cervical lymphadenopathy. No masses palpable. Normal oral mucosa  Respiratory:  Normal excursion and expansion without use of accessory

## 2025-04-10 ENCOUNTER — CLINICAL SUPPORT (OUTPATIENT)
Dept: CARDIOLOGY CLINIC | Age: 70
End: 2025-04-10

## 2025-04-10 ENCOUNTER — OFFICE VISIT (OUTPATIENT)
Dept: CARDIOLOGY CLINIC | Age: 70
End: 2025-04-10
Payer: MEDICARE

## 2025-04-10 VITALS
HEIGHT: 60 IN | HEART RATE: 141 BPM | WEIGHT: 226.19 LBS | OXYGEN SATURATION: 95 % | BODY MASS INDEX: 44.41 KG/M2 | DIASTOLIC BLOOD PRESSURE: 78 MMHG | SYSTOLIC BLOOD PRESSURE: 118 MMHG

## 2025-04-10 DIAGNOSIS — I47.19 PAROXYSMAL ATRIAL TACHYCARDIA: Primary | ICD-10-CM

## 2025-04-10 DIAGNOSIS — Z95.0 CARDIAC RESYNCHRONIZATION THERAPY PACEMAKER (CRT-P) IN PLACE: Primary | ICD-10-CM

## 2025-04-10 DIAGNOSIS — I49.5 SINUS NODE DYSFUNCTION (HCC): ICD-10-CM

## 2025-04-10 DIAGNOSIS — I42.8 NONISCHEMIC CARDIOMYOPATHY (HCC): ICD-10-CM

## 2025-04-10 DIAGNOSIS — Z95.0 S/P CARDIAC PACEMAKER PROCEDURE: ICD-10-CM

## 2025-04-10 PROCEDURE — 1123F ACP DISCUSS/DSCN MKR DOCD: CPT

## 2025-04-10 PROCEDURE — 1036F TOBACCO NON-USER: CPT

## 2025-04-10 PROCEDURE — 1160F RVW MEDS BY RX/DR IN RCRD: CPT

## 2025-04-10 PROCEDURE — G8427 DOCREV CUR MEDS BY ELIG CLIN: HCPCS

## 2025-04-10 PROCEDURE — 3017F COLORECTAL CA SCREEN DOC REV: CPT

## 2025-04-10 PROCEDURE — 3078F DIAST BP <80 MM HG: CPT

## 2025-04-10 PROCEDURE — 93000 ELECTROCARDIOGRAM COMPLETE: CPT

## 2025-04-10 PROCEDURE — G8417 CALC BMI ABV UP PARAM F/U: HCPCS

## 2025-04-10 PROCEDURE — G2211 COMPLEX E/M VISIT ADD ON: HCPCS

## 2025-04-10 PROCEDURE — 99214 OFFICE O/P EST MOD 30 MIN: CPT

## 2025-04-10 PROCEDURE — 3074F SYST BP LT 130 MM HG: CPT

## 2025-04-10 PROCEDURE — G8400 PT W/DXA NO RESULTS DOC: HCPCS

## 2025-04-10 PROCEDURE — 1159F MED LIST DOCD IN RCRD: CPT

## 2025-04-10 PROCEDURE — 1090F PRES/ABSN URINE INCON ASSESS: CPT

## 2025-04-10 NOTE — PATIENT INSTRUCTIONS
Continue Toprol  mg  Continue lisinopril 40 mg daily  Continue Lipitor 40 mg nightly  Continue Jardiance 10 mg daily  Continue Aldactone 25 mg daily  Continue aspirin 81 mg daily  Daily weights and Low-sodium diet  Follow up with Lupe Hyman CNP 8/8/2025    Follow-up 12 months [facility]

## 2025-04-22 ENCOUNTER — HOSPITAL ENCOUNTER (OUTPATIENT)
Age: 70
Discharge: HOME | End: 2025-04-22
Payer: MEDICARE

## 2025-04-22 LAB
ALBUMIN SERPL-MCNC: 4.1 G/DL (ref 3.4–5)
ANION GAP SERPL CALCULATED.3IONS-SCNC: 10 MMOL/L (ref 3–16)
BILIRUB UR QL STRIP.AUTO: NEGATIVE
BUN SERPL-MCNC: 18 MG/DL (ref 7–20)
CALCIUM SERPL-MCNC: 9.3 MG/DL (ref 8.3–10.6)
CHLORIDE SERPL-SCNC: 106 MMOL/L (ref 99–110)
CLARITY UR: CLEAR
CO2 SERPL-SCNC: 25 MMOL/L (ref 21–32)
COLOR UR: YELLOW
CREAT SERPL-MCNC: 1.1 MG/DL (ref 0.6–1.2)
EPI CELLS #/AREA URNS HPF: NORMAL /HPF (ref 0–5)
GFR SERPLBLD CREATININE-BSD FMLA CKD-EPI: 55 ML/MIN/{1.73_M2}
GLUCOSE SERPL-MCNC: 108 MG/DL (ref 70–99)
GLUCOSE UR STRIP.AUTO-MCNC: 500 MG/DL
HGB UR QL STRIP.AUTO: ABNORMAL
KETONES UR STRIP.AUTO-MCNC: NEGATIVE MG/DL
LEUKOCYTE ESTERASE UR QL STRIP.AUTO: NEGATIVE
NITRITE UR QL STRIP.AUTO: NEGATIVE
NT-PROBNP SERPL-MCNC: 135 PG/ML (ref 0–124)
PH UR STRIP.AUTO: 5.5 [PH] (ref 5–8)
PHOSPHATE SERPL-MCNC: 3.2 MG/DL (ref 2.5–4.9)
POTASSIUM SERPL-SCNC: 4.3 MMOL/L (ref 3.5–5.1)
PROT UR STRIP.AUTO-MCNC: NEGATIVE MG/DL
RBC #/AREA URNS HPF: NORMAL /HPF (ref 0–4)
SODIUM SERPL-SCNC: 141 MMOL/L (ref 136–145)
SP GR UR STRIP.AUTO: 1.01 (ref 1–1.03)
UA DIPSTICK W REFLEX MICRO PNL UR: YES
URN SPEC COLLECT METH UR: ABNORMAL
UROBILINOGEN UR STRIP-ACNC: 0.2 E.U./DL
WBC #/AREA URNS HPF: NORMAL /HPF (ref 0–5)

## 2025-04-22 PROCEDURE — 83880 ASSAY OF NATRIURETIC PEPTIDE: CPT

## 2025-04-22 PROCEDURE — 83521 IG LIGHT CHAINS FREE EACH: CPT

## 2025-04-22 PROCEDURE — 81001 URINALYSIS AUTO W/SCOPE: CPT

## 2025-04-22 PROCEDURE — 36415 COLL VENOUS BLD VENIPUNCTURE: CPT

## 2025-04-22 PROCEDURE — 80069 RENAL FUNCTION PANEL: CPT

## 2025-04-22 PROCEDURE — 82570 ASSAY OF URINE CREATININE: CPT

## 2025-04-22 PROCEDURE — 82043 UR ALBUMIN QUANTITATIVE: CPT

## 2025-04-23 LAB
CREAT UR-MCNC: 61.9 MG/DL (ref 28–259)
KAPPA LC FREE SER-MCNC: 17 MG/L (ref 2.37–20.73)
KAPPA LC FREE/LAMBDA FREE SER: 0.42 {RATIO} (ref 0.22–1.74)
LAMBDA LC FREE SERPL-MCNC: 40.7 MG/L (ref 4.23–27.69)
MICROALBUMIN UR DL<=1MG/L-MCNC: <1.2 MG/DL
MICROALBUMIN/CREAT UR: NORMAL MG/G (ref 0–30)
RPT COMMENT: ABNORMAL

## 2025-04-28 RX ORDER — LISINOPRIL 40 MG/1
40 TABLET ORAL DAILY
Qty: 90 TABLET | Refills: 1 | Status: SHIPPED | OUTPATIENT
Start: 2025-04-28 | End: 2025-05-02 | Stop reason: SDUPTHER

## 2025-04-30 RX ORDER — SPIRONOLACTONE 25 MG/1
25 TABLET ORAL DAILY
Qty: 90 TABLET | Refills: 1 | Status: SHIPPED | OUTPATIENT
Start: 2025-04-30

## 2025-04-30 NOTE — TELEPHONE ENCOUNTER
Patient last seen on 2/7/25. Advised to follow up 6 months. Next appointment 8/8/25.       Lab Results   Component Value Date     04/22/2025    K 4.3 04/22/2025     04/22/2025    CO2 25 04/22/2025    BUN 18 04/22/2025    CREATININE 1.1 04/22/2025    GLUCOSE 108 (H) 04/22/2025    CALCIUM 9.3 04/22/2025    BILITOT 0.8 09/13/2024    ALKPHOS 109 09/13/2024    AST 42 (H) 12/20/2024    ALT 47 (H) 12/20/2024    LABGLOM 55 (A) 04/22/2025    GFRAA >60 09/06/2022    AGRATIO 1.7 09/13/2024    GLOB 3.1 11/10/2017

## 2025-05-21 RX ORDER — EMPAGLIFLOZIN 10 MG/1
10 TABLET, FILM COATED ORAL DAILY
Qty: 30 TABLET | Refills: 3 | Status: SHIPPED | OUTPATIENT
Start: 2025-05-21

## 2025-05-21 NOTE — TELEPHONE ENCOUNTER
Lab Results   Component Value Date     04/22/2025    K 4.3 04/22/2025     04/22/2025    CO2 25 04/22/2025    BUN 18 04/22/2025    CREATININE 1.1 04/22/2025    GLUCOSE 108 (H) 04/22/2025    CALCIUM 9.3 04/22/2025    BILITOT 0.8 09/13/2024    ALKPHOS 109 09/13/2024    AST 42 (H) 12/20/2024    ALT 47 (H) 12/20/2024    LABGLOM 55 (A) 04/22/2025    GFRAA >60 09/06/2022    AGRATIO 1.7 09/13/2024    GLOB 3.1 11/10/2017       Patient last seen on 2/7/25. Advised to follow up 6 months. Next appointment 8/8/25.

## 2025-05-28 ENCOUNTER — PATIENT MESSAGE (OUTPATIENT)
Dept: FAMILY MEDICINE CLINIC | Age: 70
End: 2025-05-28

## 2025-06-04 ENCOUNTER — OFFICE VISIT (OUTPATIENT)
Dept: FAMILY MEDICINE CLINIC | Age: 70
End: 2025-06-04
Payer: MEDICARE

## 2025-06-04 VITALS
BODY MASS INDEX: 43.94 KG/M2 | WEIGHT: 225 LBS | TEMPERATURE: 97.5 F | DIASTOLIC BLOOD PRESSURE: 84 MMHG | OXYGEN SATURATION: 97 % | SYSTOLIC BLOOD PRESSURE: 133 MMHG | HEART RATE: 74 BPM

## 2025-06-04 DIAGNOSIS — I49.5 SINUS NODE DYSFUNCTION (HCC): ICD-10-CM

## 2025-06-04 DIAGNOSIS — E78.00 HYPERCHOLESTEREMIA: ICD-10-CM

## 2025-06-04 DIAGNOSIS — Z12.11 COLON CANCER SCREENING: ICD-10-CM

## 2025-06-04 DIAGNOSIS — R73.9 HYPERGLYCEMIA: Primary | ICD-10-CM

## 2025-06-04 DIAGNOSIS — I10 ESSENTIAL HYPERTENSION: ICD-10-CM

## 2025-06-04 DIAGNOSIS — N28.9 RENAL INSUFFICIENCY: ICD-10-CM

## 2025-06-04 DIAGNOSIS — Z95.0 CARDIAC RESYNCHRONIZATION THERAPY PACEMAKER (CRT-P) IN PLACE: ICD-10-CM

## 2025-06-04 PROCEDURE — 3079F DIAST BP 80-89 MM HG: CPT | Performed by: FAMILY MEDICINE

## 2025-06-04 PROCEDURE — G8400 PT W/DXA NO RESULTS DOC: HCPCS | Performed by: FAMILY MEDICINE

## 2025-06-04 PROCEDURE — 99214 OFFICE O/P EST MOD 30 MIN: CPT | Performed by: FAMILY MEDICINE

## 2025-06-04 PROCEDURE — 1090F PRES/ABSN URINE INCON ASSESS: CPT | Performed by: FAMILY MEDICINE

## 2025-06-04 PROCEDURE — G8427 DOCREV CUR MEDS BY ELIG CLIN: HCPCS | Performed by: FAMILY MEDICINE

## 2025-06-04 PROCEDURE — G8417 CALC BMI ABV UP PARAM F/U: HCPCS | Performed by: FAMILY MEDICINE

## 2025-06-04 PROCEDURE — 1123F ACP DISCUSS/DSCN MKR DOCD: CPT | Performed by: FAMILY MEDICINE

## 2025-06-04 PROCEDURE — 3017F COLORECTAL CA SCREEN DOC REV: CPT | Performed by: FAMILY MEDICINE

## 2025-06-04 PROCEDURE — 1159F MED LIST DOCD IN RCRD: CPT | Performed by: FAMILY MEDICINE

## 2025-06-04 PROCEDURE — 3075F SYST BP GE 130 - 139MM HG: CPT | Performed by: FAMILY MEDICINE

## 2025-06-04 PROCEDURE — 1036F TOBACCO NON-USER: CPT | Performed by: FAMILY MEDICINE

## 2025-06-04 PROCEDURE — 36415 COLL VENOUS BLD VENIPUNCTURE: CPT | Performed by: FAMILY MEDICINE

## 2025-06-04 PROCEDURE — G2211 COMPLEX E/M VISIT ADD ON: HCPCS | Performed by: FAMILY MEDICINE

## 2025-06-04 RX ORDER — BETAMETHASONE DIPROPIONATE 0.5 MG/G
CREAM TOPICAL
Qty: 30 G | Refills: 5 | Status: SHIPPED | OUTPATIENT
Start: 2025-06-04

## 2025-06-04 SDOH — ECONOMIC STABILITY: FOOD INSECURITY: WITHIN THE PAST 12 MONTHS, THE FOOD YOU BOUGHT JUST DIDN'T LAST AND YOU DIDN'T HAVE MONEY TO GET MORE.: NEVER TRUE

## 2025-06-04 SDOH — ECONOMIC STABILITY: FOOD INSECURITY: WITHIN THE PAST 12 MONTHS, YOU WORRIED THAT YOUR FOOD WOULD RUN OUT BEFORE YOU GOT MONEY TO BUY MORE.: NEVER TRUE

## 2025-06-04 ASSESSMENT — PATIENT HEALTH QUESTIONNAIRE - PHQ9
SUM OF ALL RESPONSES TO PHQ QUESTIONS 1-9: 0
1. LITTLE INTEREST OR PLEASURE IN DOING THINGS: NOT AT ALL
2. FEELING DOWN, DEPRESSED OR HOPELESS: NOT AT ALL

## 2025-06-04 NOTE — PROGRESS NOTES
Subjective:  Brenda Mcleod is here to discuss the following issues.  She has hyperglycemia with no polydipsia or polyuria and her weight is stable.  She has elevated cholesterol and continues on her statin no muscle aches or other medicine side effects.  She has SA node dysfunction and pacemaker and with a recent implantation of 1/3-lead exercise tolerance is much improved.  She had recent cardiac follow-up with excellent findings.  She has essential hypertension and is off Hytrin and on a lower dose of lisinopril.  Blood pressures have been excellent.  She has renal insufficiency and follows up with nephrology and kidney function has been stable with no edema orthopnea.  Social History     Tobacco Use   Smoking Status Former    Current packs/day: 0.00    Average packs/day: 3.0 packs/day for 10.7 years (32.2 ttl pk-yrs)    Types: Cigarettes    Start date: 6/3/1975    Quit date: 3/2/1986    Years since quittin.2   Smokeless Tobacco Never   Allergies:     Chlorthalidone    Objective:  /84   Pulse 74   Temp 97.5 °F (36.4 °C) (Oral)   Wt 102.1 kg (225 lb)   SpO2 97%   BMI 43.94 kg/m²    No acute distress, heart regular rate and rhythm without murmur, lungs clear to auscultation easy effort, abdomen nondistended, no clubbing or cyanosis    Assessment:  1. Hyperglycemia    2. Hypercholesteremia    3. Sinus node dysfunction (HCC)    4. Cardiac resynchronization therapy pacemaker (CRT-P) in place-upgrade 24-MRI COMPATIBLE    5. Essential hypertension    6. Renal insufficiency            Plan:  Blood pressures remain at goal on lower dose of lisinopril  Continue working with nephrology as advised  Labs ordered  She has better exercise tolerance with placement of third lead on her pacemaker  She is 100% pacemaker dependent  Follow-up in 6 months or as needed  The diagnoses listed in the assessment above are stable unless otherwise indicated.   Age-specific preventative health recommendations were

## 2025-06-05 ENCOUNTER — RESULTS FOLLOW-UP (OUTPATIENT)
Dept: FAMILY MEDICINE CLINIC | Age: 70
End: 2025-06-05

## 2025-06-05 DIAGNOSIS — R19.5 POSITIVE COLORECTAL CANCER SCREENING USING COLOGUARD TEST: Primary | ICD-10-CM

## 2025-06-05 LAB
ALT SERPL-CCNC: 55 U/L (ref 10–40)
ANION GAP SERPL CALCULATED.3IONS-SCNC: 13 MMOL/L (ref 3–16)
AST SERPL-CCNC: 49 U/L (ref 15–37)
BUN SERPL-MCNC: 17 MG/DL (ref 7–20)
CALCIUM SERPL-MCNC: 10 MG/DL (ref 8.3–10.6)
CHLORIDE SERPL-SCNC: 105 MMOL/L (ref 99–110)
CHOLEST SERPL-MCNC: 219 MG/DL (ref 0–199)
CO2 SERPL-SCNC: 24 MMOL/L (ref 21–32)
CREAT SERPL-MCNC: 1.2 MG/DL (ref 0.6–1.2)
EST. AVERAGE GLUCOSE BLD GHB EST-MCNC: 108.3 MG/DL
GFR SERPLBLD CREATININE-BSD FMLA CKD-EPI: 49 ML/MIN/{1.73_M2}
GLUCOSE SERPL-MCNC: 86 MG/DL (ref 70–99)
HBA1C MFR BLD: 5.4 %
HDLC SERPL-MCNC: 57 MG/DL (ref 40–60)
LDLC SERPL CALC-MCNC: 135 MG/DL
POTASSIUM SERPL-SCNC: 4.3 MMOL/L (ref 3.5–5.1)
SODIUM SERPL-SCNC: 142 MMOL/L (ref 136–145)
TRIGL SERPL-MCNC: 134 MG/DL (ref 0–150)
VLDLC SERPL CALC-MCNC: 27 MG/DL

## 2025-06-05 RX ORDER — ATORVASTATIN CALCIUM 40 MG/1
40 TABLET, FILM COATED ORAL DAILY
Qty: 90 TABLET | Refills: 1 | Status: SHIPPED | OUTPATIENT
Start: 2025-06-05

## 2025-06-17 LAB — NONINV COLON CA DNA+OCC BLD SCRN STL QL: POSITIVE

## 2025-06-17 RX ORDER — SOLIFENACIN SUCCINATE 5 MG/1
TABLET, FILM COATED ORAL
Qty: 30 TABLET | Refills: 5 | Status: SHIPPED | OUTPATIENT
Start: 2025-06-17

## 2025-06-17 NOTE — TELEPHONE ENCOUNTER
Refill Request     CONFIRM preferred pharmacy with the patient.    If Mail Order Rx - Pend for 90 day refill.      Last Seen: Last Seen Department: 6/4/2025  Last Seen by PCP: 6/4/2025    Last Written: 12/18/24    If no future appointment scheduled:  Review the last OV with PCP and review information for follow-up visit,  Route STAFF MESSAGE with patient name to the  Pool for scheduling with the following information:            -  Timing of next visit           -  Visit type ie Physical, OV, etc           -  Diagnoses/Reason ie. COPD, HTN - Do not use MEDICATION, Follow-up or CHECK UP - Give reason for visit      Next Appointment:   Future Appointments   Date Time Provider Department Center   8/8/2025  9:30 AM Fabby Hyman APRN - CNP P [facility]   9/2/2025  8:40 AM Issa Newman MD AND PULM [facility]   9/5/2025  2:00 PM Marcin Fung MD [facility]   12/10/2025 10:20 AM Luis Manuel Crocker MD [facility]       Message sent to  to schedule appt with patient?  NO      Requested Prescriptions     Pending Prescriptions Disp Refills    solifenacin (VESICARE) 5 MG tablet [Pharmacy Med Name: SOLIFENACIN SUCCINATE 5MG TABLET] 30 tablet 5     Sig: TAKE ONE (1) TABLET ONCE DAILY

## 2025-06-19 DIAGNOSIS — R19.5 POSITIVE COLORECTAL CANCER SCREENING USING COLOGUARD TEST: Primary | ICD-10-CM

## 2025-06-29 PROCEDURE — 93297 REM INTERROG DEV EVAL ICPMS: CPT | Performed by: NURSE PRACTITIONER

## 2025-07-16 ENCOUNTER — TELEPHONE (OUTPATIENT)
Dept: PULMONOLOGY | Age: 70
End: 2025-07-16

## 2025-07-16 NOTE — TELEPHONE ENCOUNTER
Patient contacted, she want to wait until her office visit on 9/2/25 w/Trent to address more if she need to stop or replace her CPAP.

## 2025-07-16 NOTE — TELEPHONE ENCOUNTER
Patient called stating that her CPAP machine had a message on it saying motor life has exceeded and said to contact provider. Please advise

## 2025-07-31 RX ORDER — SPIRONOLACTONE 25 MG/1
25 TABLET ORAL DAILY
Qty: 90 TABLET | Refills: 0 | Status: SHIPPED | OUTPATIENT
Start: 2025-07-31

## 2025-08-08 ENCOUNTER — OFFICE VISIT (OUTPATIENT)
Dept: CARDIOLOGY CLINIC | Age: 70
End: 2025-08-08
Payer: MEDICARE

## 2025-08-08 VITALS
BODY MASS INDEX: 44.37 KG/M2 | DIASTOLIC BLOOD PRESSURE: 78 MMHG | WEIGHT: 226 LBS | SYSTOLIC BLOOD PRESSURE: 122 MMHG | OXYGEN SATURATION: 98 % | HEIGHT: 60 IN | HEART RATE: 81 BPM

## 2025-08-08 DIAGNOSIS — Z95.0 CARDIAC RESYNCHRONIZATION THERAPY PACEMAKER (CRT-P) IN PLACE: ICD-10-CM

## 2025-08-08 DIAGNOSIS — I10 ESSENTIAL HYPERTENSION: ICD-10-CM

## 2025-08-08 DIAGNOSIS — I50.22 HEART FAILURE WITH MID-RANGE EJECTION FRACTION (HCC): ICD-10-CM

## 2025-08-08 DIAGNOSIS — E66.813 OBESITY, CLASS III, BMI 40-49.9 (MORBID OBESITY) (HCC): ICD-10-CM

## 2025-08-08 DIAGNOSIS — I42.8 NONISCHEMIC CARDIOMYOPATHY (HCC): Primary | ICD-10-CM

## 2025-08-08 PROCEDURE — 1090F PRES/ABSN URINE INCON ASSESS: CPT | Performed by: NURSE PRACTITIONER

## 2025-08-08 PROCEDURE — 3017F COLORECTAL CA SCREEN DOC REV: CPT | Performed by: NURSE PRACTITIONER

## 2025-08-08 PROCEDURE — 1036F TOBACCO NON-USER: CPT | Performed by: NURSE PRACTITIONER

## 2025-08-08 PROCEDURE — 1159F MED LIST DOCD IN RCRD: CPT | Performed by: NURSE PRACTITIONER

## 2025-08-08 PROCEDURE — G8400 PT W/DXA NO RESULTS DOC: HCPCS | Performed by: NURSE PRACTITIONER

## 2025-08-08 PROCEDURE — 1123F ACP DISCUSS/DSCN MKR DOCD: CPT | Performed by: NURSE PRACTITIONER

## 2025-08-08 PROCEDURE — G2211 COMPLEX E/M VISIT ADD ON: HCPCS | Performed by: NURSE PRACTITIONER

## 2025-08-08 PROCEDURE — G8427 DOCREV CUR MEDS BY ELIG CLIN: HCPCS | Performed by: NURSE PRACTITIONER

## 2025-08-08 PROCEDURE — 3078F DIAST BP <80 MM HG: CPT | Performed by: NURSE PRACTITIONER

## 2025-08-08 PROCEDURE — G8417 CALC BMI ABV UP PARAM F/U: HCPCS | Performed by: NURSE PRACTITIONER

## 2025-08-08 PROCEDURE — 99214 OFFICE O/P EST MOD 30 MIN: CPT | Performed by: NURSE PRACTITIONER

## 2025-08-08 PROCEDURE — 1160F RVW MEDS BY RX/DR IN RCRD: CPT | Performed by: NURSE PRACTITIONER

## 2025-08-08 PROCEDURE — 3074F SYST BP LT 130 MM HG: CPT | Performed by: NURSE PRACTITIONER

## 2025-08-19 ENCOUNTER — HOSPITAL ENCOUNTER (OUTPATIENT)
Dept: LAB | Age: 70
Discharge: HOME | End: 2025-08-19
Payer: MEDICARE

## 2025-08-19 DIAGNOSIS — N18.30 STAGE 3 CHRONIC KIDNEY DISEASE, UNSPECIFIED WHETHER STAGE 3A OR 3B CKD (HCC): ICD-10-CM

## 2025-08-19 DIAGNOSIS — I50.40 COMBINED SYSTOLIC AND DIASTOLIC HEART FAILURE, UNSPECIFIED HF CHRONICITY (HCC): ICD-10-CM

## 2025-08-19 LAB
ALBUMIN SERPL-MCNC: 4 G/DL (ref 3.4–5)
ANION GAP SERPL CALCULATED.3IONS-SCNC: 11 MMOL/L (ref 3–16)
BUN SERPL-MCNC: 20 MG/DL (ref 7–20)
CALCIUM SERPL-MCNC: 9.6 MG/DL (ref 8.3–10.6)
CHLORIDE SERPL-SCNC: 108 MMOL/L (ref 99–110)
CO2 SERPL-SCNC: 24 MMOL/L (ref 21–32)
CREAT SERPL-MCNC: 1.3 MG/DL (ref 0.6–1.2)
CREAT UR-MCNC: 75.4 MG/DL (ref 28–259)
GFR SERPLBLD CREATININE-BSD FMLA CKD-EPI: 45 ML/MIN/{1.73_M2}
GLUCOSE SERPL-MCNC: 96 MG/DL (ref 70–99)
MICROALBUMIN UR DL<=1MG/L-MCNC: <1.2 MG/DL
MICROALBUMIN/CREAT UR: NORMAL MG/G (ref 0–30)
NT-PROBNP SERPL-MCNC: 67 PG/ML (ref 0–124)
PHOSPHATE SERPL-MCNC: 3.4 MG/DL (ref 2.5–4.9)
POTASSIUM SERPL-SCNC: 4.2 MMOL/L (ref 3.5–5.1)
SODIUM SERPL-SCNC: 143 MMOL/L (ref 136–145)

## 2025-08-19 PROCEDURE — 36415 COLL VENOUS BLD VENIPUNCTURE: CPT

## 2025-08-19 PROCEDURE — 80069 RENAL FUNCTION PANEL: CPT

## 2025-08-19 PROCEDURE — 82610 CYSTATIN C: CPT

## 2025-08-19 PROCEDURE — 82043 UR ALBUMIN QUANTITATIVE: CPT

## 2025-08-19 PROCEDURE — 83880 ASSAY OF NATRIURETIC PEPTIDE: CPT

## 2025-08-19 PROCEDURE — 82570 ASSAY OF URINE CREATININE: CPT

## 2025-08-21 LAB
CREAT SERPL-MCNC: 1.37 MG/DL (ref 0.59–1.01)
CYSTATIN C: 6.29 MG/L (ref 0.61–0.95)
EGFR BY CYSTATIN C: 14

## 2025-08-30 PROCEDURE — 93297 REM INTERROG DEV EVAL ICPMS: CPT | Performed by: NURSE PRACTITIONER

## (undated) DEVICE — HI-TORQUE WHISPER MS GUIDE WIRE .014 STRAIGHT TIP 3.0 CM X 190 CM: Brand: HI-TORQUE WHISPER

## (undated) DEVICE — Device

## (undated) DEVICE — PINNACLE INTRODUCER SHEATH: Brand: PINNACLE

## (undated) DEVICE — 6 ML SYRINGE LUER-LOCK TIP: Brand: MONOJECT

## (undated) DEVICE — PREP SOL PVP IODINE 4%  4 OZ/BTL

## (undated) DEVICE — PLASMABLADE PS200-040 4.0: Brand: PLASMABLADE™

## (undated) DEVICE — NEEDLE HYPO 25GA L1.5IN BLU POLYPR HUB S STL REG BVL STR

## (undated) DEVICE — GAUZE,SPONGE,4"X4",8PLY,STRL,LF,10/TRAY: Brand: MEDLINE

## (undated) DEVICE — UNIDIRECTIONAL STEERABLE DIAGNOSTIC CATHETER: Brand: DYNAMIC TIP™

## (undated) DEVICE — GLOVE ORANGE PI 8   MSG9080

## (undated) DEVICE — SUTURE VICRYL SZ 4-0 L18IN ABSRB UD L19MM PS-2 3/8 CIR PRIM J496H

## (undated) DEVICE — 3M™ STERI-STRIP™ REINFORCED ADHESIVE SKIN CLOSURES, R1547, 1/2 IN X 4 IN (12 MM X 100 MM), 6 STRIPS/ENVELOPE: Brand: 3M™ STERI-STRIP™

## (undated) DEVICE — SHORT WHOLEY WIRE 145CM

## (undated) DEVICE — SURGICAL PROCEDURE PACK EYE CLERMONT

## (undated) DEVICE — PACEMAKER PACK: Brand: MEDLINE INDUSTRIES, INC.

## (undated) DEVICE — SUTURE VICRYL + SZ 3-0 L18IN ABSRB UD SH 1/2 CIR TAPERCUT NDL VCP864D

## (undated) DEVICE — KIT INTRO 9FR L13CM DIA0.118IN SPLITTABLE HEMSTAT ROBUST

## (undated) DEVICE — 5FR MICRO INTRODUCER KIT

## (undated) DEVICE — CATHETER PULM ART 5FR INFL 0.75CC L110CM BAL DIA8MM SGL WDG